# Patient Record
Sex: FEMALE | Race: WHITE | NOT HISPANIC OR LATINO
[De-identification: names, ages, dates, MRNs, and addresses within clinical notes are randomized per-mention and may not be internally consistent; named-entity substitution may affect disease eponyms.]

---

## 2017-02-01 ENCOUNTER — APPOINTMENT (OUTPATIENT)
Dept: PULMONOLOGY | Facility: CLINIC | Age: 77
End: 2017-02-01

## 2017-02-07 ENCOUNTER — APPOINTMENT (OUTPATIENT)
Dept: PULMONOLOGY | Facility: CLINIC | Age: 77
End: 2017-02-07

## 2017-02-08 ENCOUNTER — RESULT REVIEW (OUTPATIENT)
Age: 77
End: 2017-02-08

## 2017-02-15 ENCOUNTER — APPOINTMENT (OUTPATIENT)
Dept: PULMONOLOGY | Facility: CLINIC | Age: 77
End: 2017-02-15

## 2017-02-15 VITALS
HEART RATE: 55 BPM | HEIGHT: 62 IN | SYSTOLIC BLOOD PRESSURE: 118 MMHG | RESPIRATION RATE: 16 BRPM | WEIGHT: 165 LBS | BODY MASS INDEX: 30.36 KG/M2 | OXYGEN SATURATION: 98 % | DIASTOLIC BLOOD PRESSURE: 70 MMHG

## 2017-03-07 ENCOUNTER — APPOINTMENT (OUTPATIENT)
Dept: CARDIOLOGY | Facility: CLINIC | Age: 77
End: 2017-03-07

## 2017-03-07 ENCOUNTER — NON-APPOINTMENT (OUTPATIENT)
Age: 77
End: 2017-03-07

## 2017-03-07 VITALS
WEIGHT: 165 LBS | HEIGHT: 62 IN | DIASTOLIC BLOOD PRESSURE: 61 MMHG | OXYGEN SATURATION: 98 % | SYSTOLIC BLOOD PRESSURE: 107 MMHG | BODY MASS INDEX: 30.36 KG/M2 | HEART RATE: 65 BPM

## 2017-03-27 ENCOUNTER — RX RENEWAL (OUTPATIENT)
Age: 77
End: 2017-03-27

## 2017-04-10 ENCOUNTER — RX RENEWAL (OUTPATIENT)
Age: 77
End: 2017-04-10

## 2017-04-17 ENCOUNTER — APPOINTMENT (OUTPATIENT)
Dept: PULMONOLOGY | Facility: CLINIC | Age: 77
End: 2017-04-17

## 2017-04-17 VITALS
RESPIRATION RATE: 17 BRPM | DIASTOLIC BLOOD PRESSURE: 70 MMHG | HEART RATE: 69 BPM | WEIGHT: 165 LBS | HEIGHT: 62 IN | BODY MASS INDEX: 30.36 KG/M2 | SYSTOLIC BLOOD PRESSURE: 120 MMHG | OXYGEN SATURATION: 97 %

## 2017-05-08 ENCOUNTER — RX RENEWAL (OUTPATIENT)
Age: 77
End: 2017-05-08

## 2017-05-16 ENCOUNTER — APPOINTMENT (OUTPATIENT)
Dept: PULMONOLOGY | Facility: CLINIC | Age: 77
End: 2017-05-16

## 2017-05-16 VITALS
SYSTOLIC BLOOD PRESSURE: 115 MMHG | RESPIRATION RATE: 15 BRPM | OXYGEN SATURATION: 97 % | WEIGHT: 172 LBS | HEIGHT: 62 IN | DIASTOLIC BLOOD PRESSURE: 60 MMHG | BODY MASS INDEX: 31.65 KG/M2 | HEART RATE: 62 BPM

## 2017-05-16 RX ORDER — PREDNISONE 10 MG/1
10 TABLET ORAL
Qty: 100 | Refills: 0 | Status: DISCONTINUED | COMMUNITY
Start: 2017-04-17 | End: 2017-05-16

## 2017-06-05 ENCOUNTER — RX RENEWAL (OUTPATIENT)
Age: 77
End: 2017-06-05

## 2017-06-26 ENCOUNTER — RX RENEWAL (OUTPATIENT)
Age: 77
End: 2017-06-26

## 2017-07-14 ENCOUNTER — APPOINTMENT (OUTPATIENT)
Dept: OBGYN | Facility: CLINIC | Age: 77
End: 2017-07-14

## 2017-07-14 VITALS
WEIGHT: 167 LBS | DIASTOLIC BLOOD PRESSURE: 74 MMHG | BODY MASS INDEX: 31.53 KG/M2 | SYSTOLIC BLOOD PRESSURE: 122 MMHG | HEIGHT: 61 IN

## 2017-07-14 DIAGNOSIS — Z85.3 PERSONAL HISTORY OF MALIGNANT NEOPLASM OF BREAST: ICD-10-CM

## 2017-07-14 DIAGNOSIS — Z85.51 PERSONAL HISTORY OF MALIGNANT NEOPLASM OF BLADDER: ICD-10-CM

## 2017-07-14 LAB — HEMOCCULT SP1 STL QL: NEGATIVE

## 2017-07-17 LAB — HPV HIGH+LOW RISK DNA PNL CVX: NEGATIVE

## 2017-07-25 ENCOUNTER — APPOINTMENT (OUTPATIENT)
Dept: OBGYN | Facility: CLINIC | Age: 77
End: 2017-07-25

## 2017-07-31 LAB — CYTOLOGY CVX/VAG DOC THIN PREP: NORMAL

## 2017-08-07 ENCOUNTER — MESSAGE (OUTPATIENT)
Age: 77
End: 2017-08-07

## 2017-08-10 ENCOUNTER — RESULT REVIEW (OUTPATIENT)
Age: 77
End: 2017-08-10

## 2017-09-04 ENCOUNTER — RX RENEWAL (OUTPATIENT)
Age: 77
End: 2017-09-04

## 2017-09-08 ENCOUNTER — NON-APPOINTMENT (OUTPATIENT)
Age: 77
End: 2017-09-08

## 2017-09-08 ENCOUNTER — APPOINTMENT (OUTPATIENT)
Dept: CARDIOLOGY | Facility: CLINIC | Age: 77
End: 2017-09-08
Payer: MEDICARE

## 2017-09-08 VITALS
DIASTOLIC BLOOD PRESSURE: 75 MMHG | OXYGEN SATURATION: 100 % | RESPIRATION RATE: 14 BRPM | HEART RATE: 47 BPM | BODY MASS INDEX: 31.91 KG/M2 | SYSTOLIC BLOOD PRESSURE: 129 MMHG | WEIGHT: 169 LBS | HEIGHT: 61 IN | TEMPERATURE: 97.5 F

## 2017-09-08 DIAGNOSIS — Z12.72 ENCOUNTER FOR SCREENING FOR MALIGNANT NEOPLASM OF VAGINA: ICD-10-CM

## 2017-09-08 DIAGNOSIS — Z87.09 PERSONAL HISTORY OF OTHER DISEASES OF THE RESPIRATORY SYSTEM: ICD-10-CM

## 2017-09-08 DIAGNOSIS — Z87.898 PERSONAL HISTORY OF OTHER SPECIFIED CONDITIONS: ICD-10-CM

## 2017-09-08 DIAGNOSIS — R06.83 SNORING: ICD-10-CM

## 2017-09-08 DIAGNOSIS — Z87.42 PERSONAL HISTORY OF OTHER DISEASES OF THE FEMALE GENITAL TRACT: ICD-10-CM

## 2017-09-08 PROCEDURE — 99214 OFFICE O/P EST MOD 30 MIN: CPT | Mod: 25

## 2017-09-08 PROCEDURE — G0008: CPT

## 2017-09-08 PROCEDURE — 93000 ELECTROCARDIOGRAM COMPLETE: CPT

## 2017-09-08 PROCEDURE — 90686 IIV4 VACC NO PRSV 0.5 ML IM: CPT

## 2017-09-18 ENCOUNTER — APPOINTMENT (OUTPATIENT)
Dept: PULMONOLOGY | Facility: CLINIC | Age: 77
End: 2017-09-18
Payer: MEDICARE

## 2017-09-18 VITALS
BODY MASS INDEX: 31.91 KG/M2 | WEIGHT: 169 LBS | RESPIRATION RATE: 14 BRPM | OXYGEN SATURATION: 98 % | DIASTOLIC BLOOD PRESSURE: 80 MMHG | SYSTOLIC BLOOD PRESSURE: 124 MMHG | HEART RATE: 52 BPM | HEIGHT: 61 IN

## 2017-09-18 PROCEDURE — 94010 BREATHING CAPACITY TEST: CPT

## 2017-09-18 PROCEDURE — 99214 OFFICE O/P EST MOD 30 MIN: CPT | Mod: 25

## 2017-09-22 PROBLEM — Z87.09 HISTORY OF POSTNASAL DRIP: Status: RESOLVED | Noted: 2017-04-17 | Resolved: 2017-09-22

## 2017-09-22 PROBLEM — Z12.72 VAGINAL PAP SMEAR: Status: RESOLVED | Noted: 2017-07-14 | Resolved: 2017-09-22

## 2017-09-22 PROBLEM — Z87.42 HISTORY OF SENILE ATROPHIC VAGINITIS: Status: RESOLVED | Noted: 2017-07-17 | Resolved: 2017-09-22

## 2017-09-22 PROBLEM — R06.83 PRIMARY SNORING: Status: RESOLVED | Noted: 2017-02-15 | Resolved: 2017-09-22

## 2017-09-25 ENCOUNTER — RX RENEWAL (OUTPATIENT)
Age: 77
End: 2017-09-25

## 2017-09-25 ENCOUNTER — MEDICATION RENEWAL (OUTPATIENT)
Age: 77
End: 2017-09-25

## 2017-11-27 ENCOUNTER — RX RENEWAL (OUTPATIENT)
Age: 77
End: 2017-11-27

## 2017-11-27 ENCOUNTER — MEDICATION RENEWAL (OUTPATIENT)
Age: 77
End: 2017-11-27

## 2017-12-04 ENCOUNTER — RX RENEWAL (OUTPATIENT)
Age: 77
End: 2017-12-04

## 2017-12-04 ENCOUNTER — MEDICATION RENEWAL (OUTPATIENT)
Age: 77
End: 2017-12-04

## 2017-12-11 ENCOUNTER — RX RENEWAL (OUTPATIENT)
Age: 77
End: 2017-12-11

## 2018-01-02 ENCOUNTER — APPOINTMENT (OUTPATIENT)
Dept: PULMONOLOGY | Facility: CLINIC | Age: 78
End: 2018-01-02
Payer: MEDICARE

## 2018-01-02 VITALS
WEIGHT: 170 LBS | RESPIRATION RATE: 14 BRPM | SYSTOLIC BLOOD PRESSURE: 120 MMHG | HEART RATE: 58 BPM | BODY MASS INDEX: 31.28 KG/M2 | HEIGHT: 62 IN | DIASTOLIC BLOOD PRESSURE: 65 MMHG | OXYGEN SATURATION: 98 %

## 2018-01-02 PROCEDURE — 99214 OFFICE O/P EST MOD 30 MIN: CPT | Mod: 25

## 2018-01-02 PROCEDURE — 71046 X-RAY EXAM CHEST 2 VIEWS: CPT

## 2018-01-08 ENCOUNTER — RX RENEWAL (OUTPATIENT)
Age: 78
End: 2018-01-08

## 2018-01-19 ENCOUNTER — APPOINTMENT (OUTPATIENT)
Dept: CARDIOLOGY | Facility: CLINIC | Age: 78
End: 2018-01-19
Payer: MEDICARE

## 2018-01-19 VITALS
HEIGHT: 62 IN | BODY MASS INDEX: 31.28 KG/M2 | SYSTOLIC BLOOD PRESSURE: 120 MMHG | OXYGEN SATURATION: 95 % | WEIGHT: 170 LBS | DIASTOLIC BLOOD PRESSURE: 65 MMHG | HEART RATE: 60 BPM

## 2018-01-19 DIAGNOSIS — Z87.09 PERSONAL HISTORY OF OTHER DISEASES OF THE RESPIRATORY SYSTEM: ICD-10-CM

## 2018-01-19 DIAGNOSIS — J45.901 ACUTE BRONCHITIS, UNSPECIFIED: ICD-10-CM

## 2018-01-19 DIAGNOSIS — J20.9 ACUTE BRONCHITIS, UNSPECIFIED: ICD-10-CM

## 2018-01-19 PROCEDURE — 93000 ELECTROCARDIOGRAM COMPLETE: CPT

## 2018-01-19 PROCEDURE — 99215 OFFICE O/P EST HI 40 MIN: CPT | Mod: 25

## 2018-01-19 RX ORDER — PREDNISONE 10 MG/1
10 TABLET ORAL
Qty: 1 | Refills: 0 | Status: DISCONTINUED | COMMUNITY
Start: 2018-01-02 | End: 2018-01-19

## 2018-01-19 RX ORDER — FLUTICASONE FUROATE AND VILANTEROL TRIFENATATE 200; 25 UG/1; UG/1
200-25 POWDER RESPIRATORY (INHALATION) DAILY
Qty: 3 | Refills: 1 | Status: DISCONTINUED | COMMUNITY
Start: 2017-02-15 | End: 2018-01-19

## 2018-01-19 RX ORDER — AMOXICILLIN AND CLAVULANATE POTASSIUM 875; 125 MG/1; MG/1
875-125 TABLET, COATED ORAL
Qty: 20 | Refills: 0 | Status: DISCONTINUED | COMMUNITY
Start: 2017-11-28 | End: 2018-01-19

## 2018-01-19 RX ORDER — CLARITHROMYCIN 500 MG/1
500 TABLET, FILM COATED ORAL
Qty: 20 | Refills: 0 | Status: DISCONTINUED | COMMUNITY
Start: 2018-01-02 | End: 2018-01-19

## 2018-01-21 PROBLEM — Z87.09 HISTORY OF ALLERGIC RHINITIS: Status: RESOLVED | Noted: 2018-01-02 | Resolved: 2018-01-21

## 2018-01-21 PROBLEM — J20.9 ACUTE BRONCHITIS WITH ASTHMA WITH ACUTE EXACERBATION: Status: RESOLVED | Noted: 2018-01-02 | Resolved: 2018-01-21

## 2018-01-22 ENCOUNTER — APPOINTMENT (OUTPATIENT)
Dept: CARDIOLOGY | Facility: CLINIC | Age: 78
End: 2018-01-22
Payer: MEDICARE

## 2018-01-22 PROCEDURE — 93306 TTE W/DOPPLER COMPLETE: CPT

## 2018-01-24 ENCOUNTER — APPOINTMENT (OUTPATIENT)
Dept: CARDIOLOGY | Facility: CLINIC | Age: 78
End: 2018-01-24
Payer: MEDICARE

## 2018-01-24 PROCEDURE — 93015 CV STRESS TEST SUPVJ I&R: CPT

## 2018-02-02 ENCOUNTER — NON-APPOINTMENT (OUTPATIENT)
Age: 78
End: 2018-02-02

## 2018-02-02 ENCOUNTER — APPOINTMENT (OUTPATIENT)
Dept: CARDIOLOGY | Facility: CLINIC | Age: 78
End: 2018-02-02
Payer: MEDICARE

## 2018-02-02 VITALS
HEART RATE: 55 BPM | DIASTOLIC BLOOD PRESSURE: 65 MMHG | HEIGHT: 62 IN | OXYGEN SATURATION: 97 % | BODY MASS INDEX: 30.55 KG/M2 | SYSTOLIC BLOOD PRESSURE: 102 MMHG | WEIGHT: 166 LBS | TEMPERATURE: 98.2 F

## 2018-02-02 DIAGNOSIS — E55.9 VITAMIN D DEFICIENCY, UNSPECIFIED: ICD-10-CM

## 2018-02-02 PROCEDURE — 99215 OFFICE O/P EST HI 40 MIN: CPT | Mod: 25

## 2018-02-02 PROCEDURE — 93000 ELECTROCARDIOGRAM COMPLETE: CPT

## 2018-02-20 ENCOUNTER — APPOINTMENT (OUTPATIENT)
Dept: CARDIOLOGY | Facility: CLINIC | Age: 78
End: 2018-02-20
Payer: MEDICARE

## 2018-02-20 PROCEDURE — A9500: CPT

## 2018-02-20 PROCEDURE — 78452 HT MUSCLE IMAGE SPECT MULT: CPT

## 2018-02-20 PROCEDURE — 93015 CV STRESS TEST SUPVJ I&R: CPT

## 2018-03-12 ENCOUNTER — RX RENEWAL (OUTPATIENT)
Age: 78
End: 2018-03-12

## 2018-03-12 ENCOUNTER — MEDICATION RENEWAL (OUTPATIENT)
Age: 78
End: 2018-03-12

## 2018-03-16 ENCOUNTER — NON-APPOINTMENT (OUTPATIENT)
Age: 78
End: 2018-03-16

## 2018-03-16 ENCOUNTER — APPOINTMENT (OUTPATIENT)
Dept: CARDIOLOGY | Facility: CLINIC | Age: 78
End: 2018-03-16
Payer: MEDICARE

## 2018-03-16 VITALS
DIASTOLIC BLOOD PRESSURE: 66 MMHG | OXYGEN SATURATION: 97 % | HEIGHT: 62 IN | WEIGHT: 165 LBS | HEART RATE: 47 BPM | TEMPERATURE: 98.3 F | BODY MASS INDEX: 30.36 KG/M2 | SYSTOLIC BLOOD PRESSURE: 107 MMHG | RESPIRATION RATE: 14 BRPM

## 2018-03-16 PROCEDURE — 99214 OFFICE O/P EST MOD 30 MIN: CPT | Mod: 25

## 2018-03-16 PROCEDURE — 93000 ELECTROCARDIOGRAM COMPLETE: CPT

## 2018-04-10 ENCOUNTER — MEDICATION RENEWAL (OUTPATIENT)
Age: 78
End: 2018-04-10

## 2018-04-10 ENCOUNTER — RX RENEWAL (OUTPATIENT)
Age: 78
End: 2018-04-10

## 2018-05-03 ENCOUNTER — APPOINTMENT (OUTPATIENT)
Dept: PULMONOLOGY | Facility: CLINIC | Age: 78
End: 2018-05-03
Payer: MEDICARE

## 2018-05-03 VITALS
BODY MASS INDEX: 30 KG/M2 | DIASTOLIC BLOOD PRESSURE: 60 MMHG | RESPIRATION RATE: 17 BRPM | HEIGHT: 62 IN | HEART RATE: 51 BPM | WEIGHT: 163 LBS | SYSTOLIC BLOOD PRESSURE: 110 MMHG | OXYGEN SATURATION: 96 %

## 2018-05-03 PROCEDURE — 94010 BREATHING CAPACITY TEST: CPT

## 2018-05-03 PROCEDURE — 99214 OFFICE O/P EST MOD 30 MIN: CPT | Mod: 25

## 2018-08-16 ENCOUNTER — RX RENEWAL (OUTPATIENT)
Age: 78
End: 2018-08-16

## 2018-08-27 ENCOUNTER — MEDICATION RENEWAL (OUTPATIENT)
Age: 78
End: 2018-08-27

## 2018-08-27 ENCOUNTER — RX RENEWAL (OUTPATIENT)
Age: 78
End: 2018-08-27

## 2018-09-06 ENCOUNTER — APPOINTMENT (OUTPATIENT)
Dept: PULMONOLOGY | Facility: CLINIC | Age: 78
End: 2018-09-06
Payer: MEDICARE

## 2018-09-06 VITALS
BODY MASS INDEX: 30.36 KG/M2 | DIASTOLIC BLOOD PRESSURE: 60 MMHG | SYSTOLIC BLOOD PRESSURE: 126 MMHG | HEIGHT: 62 IN | OXYGEN SATURATION: 97 % | RESPIRATION RATE: 17 BRPM | WEIGHT: 165 LBS | HEART RATE: 59 BPM

## 2018-09-06 PROCEDURE — 94010 BREATHING CAPACITY TEST: CPT | Mod: 59

## 2018-09-06 PROCEDURE — 94729 DIFFUSING CAPACITY: CPT

## 2018-09-06 PROCEDURE — ZZZZZ: CPT

## 2018-09-06 PROCEDURE — 99214 OFFICE O/P EST MOD 30 MIN: CPT | Mod: 25

## 2018-09-06 PROCEDURE — 94618 PULMONARY STRESS TESTING: CPT

## 2018-09-06 NOTE — PROCEDURE
[FreeTextEntry1] : PFT-spi reveals normal flows with FEV1 of  1.68 ,which is  85 % of predicted, normal flow volume loop with a diffusion of 17.3 , which is  88% of predicted \par \par 6 minute walk test reveals low saturation of   98%; patient walked  0.236  miles or 380  meters

## 2018-09-06 NOTE — ASSESSMENT
[FreeTextEntry1] : Ms. Ponce has a history of asthma, allergies, GERD, ?PAH, and A-fib. She is currently stable from a pulmonary perspective aside from her poor sleep.\par  \par Her shortness of breath is multifactorial due to:\par -asthma\par -poor breathing mechanics\par -questionable pulmonary arterial hypertension\par -cardiac disease\par -overweight\par \par problem 1: asthma\par -add Anoro 1 inhalation QD, gargle and spit after use (transitioning from Breo Ellipta 200 at 1 inhalation QD)\par -continue Singulair 10 mg before bed \par -Asthma is believed to be caused by inherited (genetic) and environmental factor, but its exact cause is unknown. Asthma may be triggered by allergens, lung infections, or irritants in the air. Asthma triggers are different for each person\par -Inhaler technique reviewed as well as oral hygiene techniques reviewed with patient. Avoidance of cold air, extremes of temperature, rescue inhaler should be used before exercise. Order of medication reviewed with patient. Recommended use of a cool mist humidifier in the bedroom.\par \par problem 2: allergy/sinus\par -add Astelin 1 inhalation each nostril BID\par -Environmental measures for allergies were encouraged including mattress and pillow cover, air purifier, and environmental controls. \par \par problem 3: cardiac disease/ A-fib\par -continue to follow up with Dr. Rock \par \par problem 4: poor breathing mechanics\par -Proper breathing techniques were reviewed with an emphasis of exhalation. Patient instructed to breath in for 1 second and out for four seconds. Patient was encouraged to not talk while walking.\par \par problem 5: GERD\par -continue to use Omeprazole 40 mg before breakfast\par -continue to use Zantac 300 mg before bed \par -continue to use Carafate 1 g before each meal, PRN \par \par -Things to avoid including overeating, spicy foods, tight clothing, eating within three hours of bed, this list is not all inclusive. \par -For treatment of reflux, possible options discussed including diet control, H2 blockers, PPIs, as well as coating motility agents discussed as treatment options. Timing of meals and proximity of last meal to sleep were discussed. If symptoms persist, a formal gastrointestinal evaluation is needed.\par \par problem 6: low vitamin d\par -continue to use low vitamin d therapy\par -Has been associated with asthma exacerbations and increased allergic symptoms. The goal based on recent information is maintaining levels between 50-70 and low normal is 30. Recommended 50,000 units every two weeks to once a month depending on the level.\par \par problem 7: primary snoring/poor sleep (?RLS)\par -recommended to try Sleep Guard by [facility] \par -pending Requip 0.5 mg QHS \par -recommended to use Oxy-Aid by [facility] \par -sleep study was not consistent with sleep apnea \par \par problem 8: post nasal drip syndrome\par -she is to continue Flonase 1 sniff/nostriL BID\par -followed by Astelin 1 sniff/nostril BID\par -Environmental measures for allergies were encouraged including mattress and pillow cover, air purifier, and environmental controls. \par \par problem 9: leg cramping\par -recommended to try Myocalm PM\par \par problem 10: health maintenance \par -yearly flu shot after October 15\par -strep pneumonia vaccines: Prevnar-13 vaccine, followed by Pneumo vaccine 23 one year following\par -early intervention for URIs\par -regular osteoporosis evaluations\par -early dermatological evaluations\par -after the age of 52 to the age of 72, colonoscopy every 5 years \par \par \par F/U in 4 months \par She is encouraged to call with any changes, concerns, or questions.

## 2018-09-06 NOTE — REASON FOR VISIT
[Follow-Up] : a follow-up visit [FreeTextEntry1] : allergic rhinitis, asthma, DAVIS, GERD, pulmonary hypertension history, and RLS

## 2018-09-06 NOTE — HISTORY OF PRESENT ILLNESS
[FreeTextEntry1] : Ms. Ponce is a 78 year old female presenting to the office for a follow up visit for allergic rhinitis, asthma, DAVIS, GERd, pulmonary hypertension history, and RLS. Her chief complaint is shortness of breath.\par -She reports she has some back and chest discomfort "that takes her breath away"\par -She notes the pain usually presents occasionally, but has happened everyday this week\par -She states she wakes several times during the night\par -She notes she is sleeping well aside from waking up\par -She notes she snores and suffers from nocturia\par -She states her cardiologist told her the back pain is likely what is taking her breath away\par -She states she walks a mile daily for exercise\par -She states her weight is stable\par -She reports her bowels are regular\par -She reports her leg cramps have improved\par -she notes she has not been using her inhaler as she feels it does not help\par -She denies headaches, nausea, vomiting, fevers, chills, sweats, diarrhea, constipation, leg swelling or pains, wheezing, coughing, heartburn, reflux, muscle aches or pains, hematuria

## 2018-09-06 NOTE — ADDENDUM
[FreeTextEntry1] : Documented by Elvira Kaur acting as a scribe for Dr. Haroldo Mendoza on 9/6/2018.\par \par All medical record entries made by the Scribe were at my, Dr. Haroldo Mendoza's, direction and personally dictated by me on 9/6/2018. I have reviewed the chart and agree that the record accurately reflects my personal performance of the history, physical exam, assessment and plan. I have also personally directed, reviewed, and agree with the discharge instructions.

## 2018-09-06 NOTE — REVIEW OF SYSTEMS
[Negative] : Pulmonary Hypertension [Dyspnea] : dyspnea [Chest Discomfort] : chest discomfort [Nocturia] : nocturia [Difficulty Maintaining Sleep] : difficulty maintaining sleep [Snoring] : snoring [As Noted in HPI] : as noted in HPI [Back Pain] : ~T back pain

## 2018-09-10 ENCOUNTER — RX RENEWAL (OUTPATIENT)
Age: 78
End: 2018-09-10

## 2018-10-30 ENCOUNTER — APPOINTMENT (OUTPATIENT)
Dept: CARDIOLOGY | Facility: CLINIC | Age: 78
End: 2018-10-30
Payer: MEDICARE

## 2018-10-30 ENCOUNTER — NON-APPOINTMENT (OUTPATIENT)
Age: 78
End: 2018-10-30

## 2018-10-30 VITALS
SYSTOLIC BLOOD PRESSURE: 160 MMHG | RESPIRATION RATE: 16 BRPM | DIASTOLIC BLOOD PRESSURE: 70 MMHG | OXYGEN SATURATION: 97 % | BODY MASS INDEX: 30.36 KG/M2 | HEIGHT: 62 IN | WEIGHT: 165 LBS | TEMPERATURE: 98.4 F | HEART RATE: 44 BPM

## 2018-10-30 DIAGNOSIS — Z87.09 PERSONAL HISTORY OF OTHER DISEASES OF THE RESPIRATORY SYSTEM: ICD-10-CM

## 2018-10-30 DIAGNOSIS — Z86.69 PERSONAL HISTORY OF OTHER DISEASES OF THE NERVOUS SYSTEM AND SENSE ORGANS: ICD-10-CM

## 2018-10-30 PROCEDURE — 90686 IIV4 VACC NO PRSV 0.5 ML IM: CPT

## 2018-10-30 PROCEDURE — G0008: CPT

## 2018-10-30 PROCEDURE — 93000 ELECTROCARDIOGRAM COMPLETE: CPT

## 2018-10-30 PROCEDURE — 99215 OFFICE O/P EST HI 40 MIN: CPT | Mod: 25

## 2018-10-30 RX ORDER — METOPROLOL SUCCINATE 25 MG/1
25 TABLET, EXTENDED RELEASE ORAL DAILY
Qty: 30 | Refills: 1 | Status: DISCONTINUED | COMMUNITY
Start: 2017-09-25 | End: 2018-10-30

## 2018-11-05 PROBLEM — Z87.09 HISTORY OF ALLERGIC RHINITIS: Status: RESOLVED | Noted: 2018-05-03 | Resolved: 2018-11-04

## 2018-11-05 PROBLEM — Z87.09 HISTORY OF ASTHMA: Status: RESOLVED | Noted: 2018-05-03 | Resolved: 2018-11-04

## 2018-11-05 PROBLEM — Z86.69 HISTORY OF RESTLESS LEGS SYNDROME: Status: RESOLVED | Noted: 2018-05-03 | Resolved: 2018-11-04

## 2018-11-05 NOTE — PHYSICAL EXAM
[General Appearance - Well Developed] : well developed [Normal Appearance] : normal appearance [Well Groomed] : well groomed [General Appearance - Well Nourished] : well nourished [No Deformities] : no deformities [General Appearance - In No Acute Distress] : no acute distress [Normal Conjunctiva] : the conjunctiva exhibited no abnormalities [Eyelids - No Xanthelasma] : the eyelids demonstrated no xanthelasmas [Normal Oral Mucosa] : normal oral mucosa [No Oral Pallor] : no oral pallor [No Oral Cyanosis] : no oral cyanosis [Normal Jugular Venous A Waves Present] : normal jugular venous A waves present [Normal Jugular Venous V Waves Present] : normal jugular venous V waves present [No Jugular Venous Yu A Waves] : no jugular venous yu A waves [Respiration, Rhythm And Depth] : normal respiratory rhythm and effort [Exaggerated Use Of Accessory Muscles For Inspiration] : no accessory muscle use [Auscultation Breath Sounds / Voice Sounds] : lungs were clear to auscultation bilaterally [Abdomen Soft] : soft [Abdomen Tenderness] : non-tender [Abdomen Mass (___ Cm)] : no abdominal mass palpated [Abnormal Walk] : normal gait [Gait - Sufficient For Exercise Testing] : the gait was sufficient for exercise testing [Nail Clubbing] : no clubbing of the fingernails [Cyanosis, Localized] : no localized cyanosis [Petechial Hemorrhages (___cm)] : no petechial hemorrhages [Skin Color & Pigmentation] : normal skin color and pigmentation [] : no rash [No Venous Stasis] : no venous stasis [Skin Lesions] : no skin lesions [No Skin Ulcers] : no skin ulcer [No Xanthoma] : no  xanthoma was observed [Oriented To Time, Place, And Person] : oriented to person, place, and time [Affect] : the affect was normal [Mood] : the mood was normal [No Anxiety] : not feeling anxious [5th Left ICS - MCL] : palpated at the 5th LICS in the midclavicular line [Normal] : normal [No Precordial Heave] : no precordial heave was noted [Normal Rate] : normal [Normal S1] : normal S1 [Normal S2] : normal S2 [S3] : no S3 [S4] : an S4 was heard [I] : a grade 1 [II] : a grade 2 [Right Carotid Bruit] : no bruit heard over the right carotid [Left Carotid Bruit] : no bruit heard over the left carotid [Right Femoral Bruit] : no bruit heard over the right femoral artery [Left Femoral Bruit] : no bruit heard over the left femoral artery [2+] : left 2+ [No Abnormalities] : the abdominal aorta was not enlarged and no bruit was heard [Bruit] : no bruit heard [No Pitting Edema] : no pitting edema present

## 2018-11-05 NOTE — HISTORY OF PRESENT ILLNESS
[FreeTextEntry1] : Still winded with activity.  saw pulmonary who feels her SOB is multifactorial (asthma, possible pulmonary HTN, overweight, cardiac disease, and being over weight).  BP is elevated here today but the only cardiac disease that could be contributing is diastolic dysfunction.  Will adjust meds to try and help this.

## 2018-11-05 NOTE — REVIEW OF SYSTEMS
[Headache] : headache [Feeling Fatigued] : not feeling fatigued [see HPI] : see HPI [Shortness Of Breath] : no shortness of breath [Dyspnea on exertion] : dyspnea during exertion [Chest  Pressure] : no chest pressure [Chest Pain] : no chest pain [Lower Ext Edema] : no extremity edema [Leg Claudication] : no intermittent leg claudication [Palpitations] : no palpitations [Cough] : cough [Negative] : Heme/Lymph

## 2018-11-05 NOTE — DISCUSSION/SUMMARY
[FreeTextEntry1] : DAVIS: Possiblely from DD secondary to elevated BP. Increase hydralazine.\par Atrial fibrillation: Doing reasonably well on current regime. Wants to change to Xarelto.  Will change over. 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  Continue to monitor.\par Malignant hypertension: Sub optimal control. Medication adjusted as noted above. Low sodium discussed.\par Followup 3 months.

## 2018-11-05 NOTE — REASON FOR VISIT
[Follow-Up - Clinic] : a clinic follow-up of [Abnormal ECG] : an abnormal ECG [Atrial Fibrillation] : atrial fibrillation [Hypertension] : hypertension

## 2018-11-05 NOTE — CARDIOLOGY SUMMARY
[No Ischemia] : no Ischemia [No Exercise Ind Arr] : no exercise induced arrhythmias [No Symptoms] : no Symptoms [___] : [unfilled] [LVEF ___%] : LVEF [unfilled]% [None] : normal LV function [Mild] : mild pulmonary hypertension [Enlarged] : enlarged LA size

## 2018-11-30 ENCOUNTER — APPOINTMENT (OUTPATIENT)
Dept: CARDIOLOGY | Facility: CLINIC | Age: 78
End: 2018-11-30
Payer: MEDICARE

## 2018-11-30 VITALS
BODY MASS INDEX: 30.36 KG/M2 | OXYGEN SATURATION: 97 % | DIASTOLIC BLOOD PRESSURE: 70 MMHG | HEART RATE: 48 BPM | TEMPERATURE: 98.5 F | HEIGHT: 62 IN | WEIGHT: 165 LBS | SYSTOLIC BLOOD PRESSURE: 129 MMHG | RESPIRATION RATE: 16 BRPM

## 2018-11-30 PROCEDURE — 93000 ELECTROCARDIOGRAM COMPLETE: CPT

## 2018-11-30 PROCEDURE — 99214 OFFICE O/P EST MOD 30 MIN: CPT | Mod: 25

## 2018-11-30 RX ORDER — FLUTICASONE FUROATE AND VILANTEROL TRIFENATATE 200; 25 UG/1; UG/1
200-25 POWDER RESPIRATORY (INHALATION) DAILY
Qty: 3 | Refills: 1 | Status: DISCONTINUED | COMMUNITY
Start: 2017-09-18 | End: 2018-11-30

## 2018-11-30 RX ORDER — AZELASTINE HYDROCHLORIDE 205.5 UG/1
0.15 SPRAY, METERED NASAL TWICE DAILY
Qty: 1 | Refills: 1 | Status: DISCONTINUED | COMMUNITY
Start: 2018-01-02 | End: 2018-11-30

## 2018-12-09 NOTE — PHYSICAL EXAM
[General Appearance - Well Developed] : well developed [Normal Appearance] : normal appearance [Well Groomed] : well groomed [General Appearance - Well Nourished] : well nourished [No Deformities] : no deformities [General Appearance - In No Acute Distress] : no acute distress [Normal Conjunctiva] : the conjunctiva exhibited no abnormalities [Eyelids - No Xanthelasma] : the eyelids demonstrated no xanthelasmas [Normal Oral Mucosa] : normal oral mucosa [No Oral Pallor] : no oral pallor [No Oral Cyanosis] : no oral cyanosis [Normal Jugular Venous A Waves Present] : normal jugular venous A waves present [Normal Jugular Venous V Waves Present] : normal jugular venous V waves present [No Jugular Venous Yu A Waves] : no jugular venous yu A waves [Respiration, Rhythm And Depth] : normal respiratory rhythm and effort [Exaggerated Use Of Accessory Muscles For Inspiration] : no accessory muscle use [Auscultation Breath Sounds / Voice Sounds] : lungs were clear to auscultation bilaterally [Abdomen Soft] : soft [Abdomen Tenderness] : non-tender [Abdomen Mass (___ Cm)] : no abdominal mass palpated [Abnormal Walk] : normal gait [Gait - Sufficient For Exercise Testing] : the gait was sufficient for exercise testing [Nail Clubbing] : no clubbing of the fingernails [Cyanosis, Localized] : no localized cyanosis [Petechial Hemorrhages (___cm)] : no petechial hemorrhages [Skin Color & Pigmentation] : normal skin color and pigmentation [] : no rash [No Venous Stasis] : no venous stasis [Skin Lesions] : no skin lesions [No Skin Ulcers] : no skin ulcer [No Xanthoma] : no  xanthoma was observed [Oriented To Time, Place, And Person] : oriented to person, place, and time [Affect] : the affect was normal [Mood] : the mood was normal [No Anxiety] : not feeling anxious [5th Left ICS - MCL] : palpated at the 5th LICS in the midclavicular line [Normal] : normal [No Precordial Heave] : no precordial heave was noted [Normal Rate] : normal [Normal S1] : normal S1 [Normal S2] : normal S2 [S4] : an S4 was heard [I] : a grade 1 [II] : a grade 2 [2+] : left 2+ [No Abnormalities] : the abdominal aorta was not enlarged and no bruit was heard [No Pitting Edema] : no pitting edema present [S3] : no S3 [Right Carotid Bruit] : no bruit heard over the right carotid [Left Carotid Bruit] : no bruit heard over the left carotid [Right Femoral Bruit] : no bruit heard over the right femoral artery [Left Femoral Bruit] : no bruit heard over the left femoral artery [Bruit] : no bruit heard

## 2018-12-09 NOTE — HISTORY OF PRESENT ILLNESS
[FreeTextEntry1] : BP much better but is still winded.  She is questioning it being from hydralazine.  Denies chest pain.  orthopnea or PND. Discussed if she wants to change at this time and she does not.

## 2018-12-09 NOTE — REVIEW OF SYSTEMS
[Headache] : headache [see HPI] : see HPI [Dyspnea on exertion] : dyspnea during exertion [Cough] : cough [Negative] : Heme/Lymph [Feeling Fatigued] : not feeling fatigued [Shortness Of Breath] : no shortness of breath [Chest  Pressure] : no chest pressure [Chest Pain] : no chest pain [Lower Ext Edema] : no extremity edema [Leg Claudication] : no intermittent leg claudication [Palpitations] : no palpitations

## 2018-12-09 NOTE — DISCUSSION/SUMMARY
[FreeTextEntry1] : DAVIS/Malignant hypertension: Improved BP control and DAVIS better but not resolved.  Wants to continue this regime and see if time helps it improve further. Low sodium discussed.\par Atrial fibrillation: Doing reasonably well on current regime. Continue to monitor.\par Sleep Apnea: Following with pulmonary.\par Followup 3 months.

## 2018-12-11 ENCOUNTER — NON-APPOINTMENT (OUTPATIENT)
Age: 78
End: 2018-12-11

## 2019-01-07 ENCOUNTER — MEDICATION RENEWAL (OUTPATIENT)
Age: 79
End: 2019-01-07

## 2019-01-10 ENCOUNTER — NON-APPOINTMENT (OUTPATIENT)
Age: 79
End: 2019-01-10

## 2019-01-10 ENCOUNTER — APPOINTMENT (OUTPATIENT)
Dept: PULMONOLOGY | Facility: CLINIC | Age: 79
End: 2019-01-10
Payer: MEDICARE

## 2019-01-10 ENCOUNTER — RX RENEWAL (OUTPATIENT)
Age: 79
End: 2019-01-10

## 2019-01-10 VITALS
RESPIRATION RATE: 15 BRPM | HEART RATE: 47 BPM | HEIGHT: 62 IN | OXYGEN SATURATION: 97 % | DIASTOLIC BLOOD PRESSURE: 70 MMHG | BODY MASS INDEX: 30 KG/M2 | SYSTOLIC BLOOD PRESSURE: 120 MMHG | WEIGHT: 163 LBS

## 2019-01-10 PROCEDURE — 99214 OFFICE O/P EST MOD 30 MIN: CPT | Mod: 25

## 2019-01-10 PROCEDURE — 94010 BREATHING CAPACITY TEST: CPT

## 2019-01-10 NOTE — HISTORY OF PRESENT ILLNESS
[FreeTextEntry1] : Ms. Ponce is a 78 year old female presenting to the office for a follow up visit for allergic rhinitis, asthma, DAVIS, GERD, pulmonary hypertension history, and RLS. Her chief complaint is back pain and shortness of breath.\par -She reports she has bilateral upper and lower back pain which causes her to be short of breath\par -she notes she also had chest pain with her back pain\par -she states she get SOB with stairs as well\par -she reports her heartburn and reflux has been active as well\par -she states the back pain occurs randomly  throughout the day\par -she notes she has had this pain for 2 years now\par -she reports her bowels are regular \par -she states she is sleeping well\par -she notes she has been coughing as well\par -she reports her sinuses are clear but she has a post nasal drip\par -she denies any chest pressure, diarrhea, constipation, dysphagia, dizziness

## 2019-01-10 NOTE — PROCEDURE
[FreeTextEntry1] : PFT revealed normal flows, with a FEV1 of 1.71  ,which is 91% of predicted, with a normal flow volume loop\par

## 2019-01-10 NOTE — REVIEW OF SYSTEMS
[Negative] : Sleep Disorder [Cough] : cough [Dyspnea] : dyspnea [Chest Discomfort] : chest discomfort [Heartburn] : heartburn [Reflux] : reflux [As Noted in HPI] : as noted in HPI [Back Pain] : ~T back pain

## 2019-01-10 NOTE — REASON FOR VISIT
[Follow-Up] : a follow-up visit [Spouse] : spouse [FreeTextEntry1] : allergic rhinitis, asthma, DAVIS, GERD, pulmonary hypertension history, and RLS

## 2019-01-10 NOTE — ADDENDUM
[FreeTextEntry1] : Documented by Tod Melo acting as a scribe for Dr. Haroldo Mendoza on 1/10/19.\par \par All medical record entries made by the Scribe were at my, Dr. Haroldo Mendoza's, direction and personally dictated by me on 1/10/19. I have reviewed the chart and agree that the record accurately reflects my personal performance of the history, physical exam, procedure, assessment and plan. I have also personally directed, reviewed, and agree with the discharge instructions. \par \par

## 2019-01-10 NOTE — ASSESSMENT
[FreeTextEntry1] : Ms. Ponce has a history of asthma, allergies, GERD, ?PAH, and A-fib. She is currently stable from a pulmonary perspective aside from her poor sleep and bilateral back pain\par  \par Her shortness of breath is multifactorial due to:\par -asthma\par -poor breathing mechanics\par -questionable pulmonary arterial hypertension\par -cardiac disease\par -overweight\par \par problem 1: asthma\par -continue Anoro 1 inhalation QD, gargle and spit after use \par -continue Singulair 10 mg before bed \par -Asthma is believed to be caused by inherited (genetic) and environmental factor, but its exact cause is unknown. Asthma may be triggered by allergens, lung infections, or irritants in the air. Asthma triggers are different for each person\par -Inhaler technique reviewed as well as oral hygiene techniques reviewed with patient. Avoidance of cold air, extremes of temperature, rescue inhaler should be used before exercise. Order of medication reviewed with patient. Recommended use of a cool mist humidifier in the bedroom.\par \par problem 2: allergy/ post nasal drip syndrome\par  - continue Flonase 1 sniff/nostriL BID\par -followed by Valarie (.15) 1 sniff/nostril BID\par -Environmental measures for allergies were encouraged including mattress and pillow cover, air purifier, and environmental controls.\par \par problem 3: cardiac disease/ A-fib\par -continue to follow up with Dr. Rock \par \par problem 4: poor breathing mechanics\par -Proper breathing techniques were reviewed with an emphasis of exhalation. Patient instructed to breath in for 1 second and out for four seconds. Patient was encouraged to not talk while walking.\par \par problem 5: GERD\par -continue to use Omeprazole 40 mg before breakfast\par -continue to use Zantac 300 mg before bed \par -continue to use Carafate 1 g before each meal, PRN \par \par -Things to avoid including overeating, spicy foods, tight clothing, eating within three hours of bed, this list is not all inclusive. \par -For treatment of reflux, possible options discussed including diet control, H2 blockers, PPIs, as well as coating motility agents discussed as treatment options. Timing of meals and proximity of last meal to sleep were discussed. If symptoms persist, a formal gastrointestinal evaluation is needed.\par \par problem 6: low vitamin d\par -continue to use low vitamin d therapy\par -Has been associated with asthma exacerbations and increased allergic symptoms. The goal based on recent information is maintaining levels between 50-70 and low normal is 30. Recommended 50,000 units every two weeks to once a month depending on the level.\par \par problem 7: primary snoring/poor sleep (?RLS)\par -recommended to try Sleep Guard by [facility] \par -pending Requip 0.5 mg QHS \par -recommended to use Oxy-Aid by [employer] \par -sleep study was not consistent with sleep apnea \par \par problem 8: leg cramping / back pain\par -add Tizanidine 2 mg BID up to TiD\par -recommended to try Myocalm PM\par \par problem 9: health maintenance \par - s/p 2018 flu shot \par -strep pneumonia vaccines: Prevnar-13 vaccine, followed by Pneumo vaccine 23 one year following\par -early intervention for URIs\par -regular osteoporosis evaluations\par -early dermatological evaluations\par -after the age of 52 to the age of 72, colonoscopy every 5 years \par \par \par F/U in 4 months \par She is encouraged to call with any changes, concerns, or questions.

## 2019-01-17 ENCOUNTER — APPOINTMENT (OUTPATIENT)
Dept: OBGYN | Facility: CLINIC | Age: 79
End: 2019-01-17
Payer: MEDICARE

## 2019-01-17 VITALS
HEIGHT: 62 IN | BODY MASS INDEX: 25.21 KG/M2 | DIASTOLIC BLOOD PRESSURE: 74 MMHG | WEIGHT: 137 LBS | SYSTOLIC BLOOD PRESSURE: 130 MMHG

## 2019-01-17 LAB — HEMOCCULT SP1 STL QL: NEGATIVE

## 2019-01-17 PROCEDURE — G0101: CPT

## 2019-01-17 PROCEDURE — 82270 OCCULT BLOOD FECES: CPT

## 2019-01-17 NOTE — COUNSELING
[Breast Self Exam] : breast self exam [Nutrition] : nutrition [Exercise] : exercise [Vitamins/Supplements] : vitamins/supplements [FreeTextEntry2] : Encouraged pt to consume 1200 mg. of calcium daily, in foods, or in supplements.  If using supplements, correct use of same reviewed.  Advised pt to get her Vit D level checked, and to take at least 1000 iu of Vit D3 daily in the interim;  discussed importance of weight bearing and resistance exercise,  5 times weekly for 30 minutes, and  finally safety was discussed .

## 2019-01-17 NOTE — HISTORY OF PRESENT ILLNESS
[___ Year(s) Ago] : [unfilled] year(s) ago [Good] : being in good health [Healthy Diet] : a healthy diet [Regular Exercise] : regular exercise [___ Servings of Dairy/Day] : [unfilled] servings of dairy foods per day [3 or more times/wk] :  3 or more times per week [Last Mammogram ___] : Last Mammogram was [unfilled] [Last Bone Density ___] : Last bone density studies [unfilled] [Last Colonoscopy ___] : Last colonoscopy [unfilled] [Last Pap ___] : Last cervical pap smear was [unfilled] [Performed Within 5 Years] : lipid profile performed within the past five years [Performed Last Year] : thyroid function test performed last year [Uncertain Timing] : uncertain timing of ~his/her~ last DEXA [Hypertension] : hypertension [FH Cardiovascular Disease] : family history of cardiovascular disease [Previous Breast Cancer] : previous breast cancer [Previous Colon Polyps] : previous colon polyps [Osteoporosis Risk Factors] : osteoporosis risk factors [Walking] : walking [Current] : risk screening reviewed and current [Postmenopausal] : is postmenopausal [Pregnancy History] : pregnancy history: [Total Preg ___] : [unfilled] [Full Term ___] : [unfilled] [AB Spont ___] : miscarriages: [unfilled] [Sexually Active] : is sexually active [Monogamous (Male Partner)] : is monogamous with a male partner [HPV Vaccine NA Due to Age] : HPV vaccine not available to patient due to age [Weight Concerns] : no concerns with her weight [Diabetes] : no diabetes [High LDL] : no high LDL cholesterol [Low HDL] : no low HDL cholesterol [Obesity] : no obesity [Tobacco Use] : no tobacco use [Illicit Drug Use] : no illicit drug use [Sedentary Lifestyle] : no sedentary lifestyle [Abnormal Cervical Cytology] : no abnormal cervical cytology [HPV Positive] : no positive screening for human papilloma virus [In Utero SHANTHI Exposure] : no diethylstilbestrol (SHANTHI) exposure in utero [Inflammatory Bowel Disease] : no inflammatory bowel disease [de-identified] : bladder cancer [de-identified] : hyst at 1975 and no menses thereafter

## 2019-01-17 NOTE — PHYSICAL EXAM
[Awake] : awake [Alert] : alert [Breast Palpation Implant ___cm In The Left Breast] : an implant [Left Breast Absent] : a total mastectomy [Soft] : soft [Oriented x3] : oriented to person, place, and time [Normal] : vagina [Atrophy] : atrophy [No Bleeding] : there was no active vaginal bleeding [Absent] : absent [Uterine Adnexae] : were not tender and not enlarged [No Tenderness] : no rectal tenderness [Acute Distress] : no acute distress [LAD] : no lymphadenopathy [Thyroid Nodule] : no thyroid nodule [Goiter] : no goiter [Mass] : no breast mass [Nipple Discharge] : no nipple discharge [Axillary LAD] : no axillary lymphadenopathy [Tender] : non tender [Occult Blood] : occult blood test from digital rectal exam was negative

## 2019-01-29 ENCOUNTER — APPOINTMENT (OUTPATIENT)
Dept: CARDIOLOGY | Facility: CLINIC | Age: 79
End: 2019-01-29
Payer: MEDICARE

## 2019-01-29 ENCOUNTER — NON-APPOINTMENT (OUTPATIENT)
Age: 79
End: 2019-01-29

## 2019-01-29 VITALS
WEIGHT: 167 LBS | HEIGHT: 62 IN | DIASTOLIC BLOOD PRESSURE: 68 MMHG | BODY MASS INDEX: 30.73 KG/M2 | OXYGEN SATURATION: 98 % | HEART RATE: 62 BPM | SYSTOLIC BLOOD PRESSURE: 134 MMHG

## 2019-01-29 DIAGNOSIS — G89.29 PERSONAL HISTORY OF OTHER DISEASES OF THE MUSCULOSKELETAL SYSTEM AND CONNECTIVE TISSUE: ICD-10-CM

## 2019-01-29 DIAGNOSIS — R06.83 SNORING: ICD-10-CM

## 2019-01-29 DIAGNOSIS — Z87.19 PERSONAL HISTORY OF OTHER DISEASES OF THE DIGESTIVE SYSTEM: ICD-10-CM

## 2019-01-29 DIAGNOSIS — G47.62 SLEEP RELATED LEG CRAMPS: ICD-10-CM

## 2019-01-29 DIAGNOSIS — Z87.09 PERSONAL HISTORY OF OTHER DISEASES OF THE RESPIRATORY SYSTEM: ICD-10-CM

## 2019-01-29 DIAGNOSIS — R79.89 OTHER SPECIFIED ABNORMAL FINDINGS OF BLOOD CHEMISTRY: ICD-10-CM

## 2019-01-29 DIAGNOSIS — Z12.11 ENCOUNTER FOR SCREENING FOR MALIGNANT NEOPLASM OF COLON: ICD-10-CM

## 2019-01-29 DIAGNOSIS — Z01.419 ENCOUNTER FOR GYNECOLOGICAL EXAMINATION (GENERAL) (ROUTINE) W/OUT ABNORMAL FINDINGS: ICD-10-CM

## 2019-01-29 DIAGNOSIS — Z87.39 PERSONAL HISTORY OF OTHER DISEASES OF THE MUSCULOSKELETAL SYSTEM AND CONNECTIVE TISSUE: ICD-10-CM

## 2019-01-29 PROCEDURE — 93000 ELECTROCARDIOGRAM COMPLETE: CPT

## 2019-01-29 PROCEDURE — 99214 OFFICE O/P EST MOD 30 MIN: CPT | Mod: 25

## 2019-01-29 RX ORDER — WARFARIN 3 MG/1
3 TABLET ORAL
Qty: 90 | Refills: 0 | Status: DISCONTINUED | COMMUNITY
Start: 2017-05-26 | End: 2019-01-29

## 2019-01-29 NOTE — DISCUSSION/SUMMARY
[FreeTextEntry1] : Malignant hypertension: Controlled and better.  Low sodium discussed.\par Atrial fibrillation: Doing reasonably well on current regime. Continue to monitor.\par Sleep Apnea: Following with pulmonary.\par HL: Recent Lipids with good results.\par Followup 6 months.

## 2019-01-29 NOTE — HISTORY OF PRESENT ILLNESS
[FreeTextEntry1] : BP good and is used to the way she is on hydralazine.  Denies chest pain, has rare palpitations, but denies orthopnea, paroxysmal nocturnal dyspnea, claudication, dizziness,  lightheadedness, presyncopal, or syncopal symptoms.\par

## 2019-03-11 ENCOUNTER — RX RENEWAL (OUTPATIENT)
Age: 79
End: 2019-03-11

## 2019-03-12 ENCOUNTER — RX RENEWAL (OUTPATIENT)
Age: 79
End: 2019-03-12

## 2019-05-10 ENCOUNTER — NON-APPOINTMENT (OUTPATIENT)
Age: 79
End: 2019-05-10

## 2019-05-10 ENCOUNTER — APPOINTMENT (OUTPATIENT)
Dept: PULMONOLOGY | Facility: CLINIC | Age: 79
End: 2019-05-10
Payer: MEDICARE

## 2019-05-10 VITALS
OXYGEN SATURATION: 97 % | HEIGHT: 62 IN | SYSTOLIC BLOOD PRESSURE: 130 MMHG | WEIGHT: 166 LBS | RESPIRATION RATE: 16 BRPM | HEART RATE: 56 BPM | BODY MASS INDEX: 30.55 KG/M2 | DIASTOLIC BLOOD PRESSURE: 60 MMHG

## 2019-05-10 PROCEDURE — 99214 OFFICE O/P EST MOD 30 MIN: CPT | Mod: 25

## 2019-05-10 PROCEDURE — 94010 BREATHING CAPACITY TEST: CPT

## 2019-05-10 NOTE — HISTORY OF PRESENT ILLNESS
[FreeTextEntry1] : Ms. Ponce is a 78 year old female presenting to the office for a follow up visit for allergic rhinitis, asthma, DAVIS, GERD, pulmonary hypertension history, and RLS. Her chief complaint is joint pain and heartburn\par - She comes in stating that she has had a number of joint pains all over her body\par - She notes having neck pain with associated chest pain\par - She has a dry mouth \par - She gets occasional dizziness. \par - She has been  having difficulty ambulating due to leg pain (joint)\par - She coughs once in a while\par - She states that her sleep is generally "not bad"\par - She sleeps around 4 hours. \par - She has been eating relatively well \par - She gets agita everyday. \par - Her weight is stable \par - She denies any headaches, nausea, vomiting, fever, chills, sweats, chest pressure, palpitations, SOB, wheezing, diarrhea, constipation, dysphagia, myalgias, leg swelling, itchy eyes, itchy ears, or sour taste in the mouth.

## 2019-05-10 NOTE — PHYSICAL EXAM
[General Appearance - Well Developed] : well developed [Normal Appearance] : normal appearance [Well Groomed] : well groomed [General Appearance - In No Acute Distress] : no acute distress [No Deformities] : no deformities [Normal Conjunctiva] : the conjunctiva exhibited no abnormalities [General Appearance - Well Nourished] : well nourished [Eyelids - No Xanthelasma] : the eyelids demonstrated no xanthelasmas [Normal Oropharynx] : normal oropharynx [Neck Appearance] : the appearance of the neck was normal [II] : II [Jugular Venous Distention Increased] : there was no jugular-venous distention [Neck Cervical Mass (___cm)] : no neck mass was observed [Thyroid Diffuse Enlargement] : the thyroid was not enlarged [Heart Rate And Rhythm] : heart rate and rhythm were normal [Thyroid Nodule] : there were no palpable thyroid nodules [Heart Sounds] : normal S1 and S2 [Murmurs] : no murmurs present [Respiration, Rhythm And Depth] : normal respiratory rhythm and effort [Auscultation Breath Sounds / Voice Sounds] : lungs were clear to auscultation bilaterally [Exaggerated Use Of Accessory Muscles For Inspiration] : no accessory muscle use [Abdomen Soft] : soft [Abdomen Tenderness] : non-tender [Abdomen Mass (___ Cm)] : no abdominal mass palpated [Abnormal Walk] : normal gait [Gait - Sufficient For Exercise Testing] : the gait was sufficient for exercise testing [Cyanosis, Localized] : no localized cyanosis [Nail Clubbing] : no clubbing of the fingernails [Skin Color & Pigmentation] : normal skin color and pigmentation [] : no rash [Petechial Hemorrhages (___cm)] : no petechial hemorrhages [No Venous Stasis] : no venous stasis [Skin Lesions] : no skin lesions [No Skin Ulcers] : no skin ulcer [No Xanthoma] : no  xanthoma was observed [Deep Tendon Reflexes (DTR)] : deep tendon reflexes were 2+ and symmetric [Sensation] : the sensory exam was normal to light touch and pinprick [No Focal Deficits] : no focal deficits [Impaired Insight] : insight and judgment were intact [Oriented To Time, Place, And Person] : oriented to person, place, and time [Affect] : the affect was normal [FreeTextEntry1] : I:E 1:3, clear

## 2019-05-10 NOTE — PROCEDURE
[FreeTextEntry1] : PFT- spi reveals normal flows; FEV1 was 1.75 L which is 92% of predicted; normal flow volume loop \par \par Pt unable to complete NiuOx

## 2019-05-10 NOTE — ASSESSMENT
[FreeTextEntry1] : Ms. Ponce has a history of asthma, allergies, GERD, ?PAH, and A-fib. She is currently stable from a pulmonary perspective aside from her poor sleep, bilateral back pain, and reflux symptoms (currently off of omeprazole). \par  \par Her shortness of breath is multifactorial due to:\par -asthma\par -poor breathing mechanics\par -questionable pulmonary arterial hypertension\par -cardiac disease\par -overweight\par \par problem 1: asthma\par -continue Anoro 1 inhalation QD, gargle and spit after use \par -continue Singulair 10 mg before bed \par -Asthma is believed to be caused by inherited (genetic) and environmental factor, but its exact cause is unknown. Asthma may be triggered by allergens, lung infections, or irritants in the air. Asthma triggers are different for each person\par -Inhaler technique reviewed as well as oral hygiene techniques reviewed with patient. Avoidance of cold air, extremes of temperature, rescue inhaler should be used before exercise. Order of medication reviewed with patient. Recommended use of a cool mist humidifier in the bedroom.\par \par problem 2: allergy/ post nasal drip syndrome\par  - continue Flonase 1 sniff/nostriL BID\par -followed by Valarie (.15) 1 sniff/nostril BID\par -Environmental measures for allergies were encouraged including mattress and pillow cover, air purifier, and environmental controls.\par \par problem 3: cardiac disease/ A-fib\par -continue to follow up with Dr. Rock \par \par problem 4: poor breathing mechanics\par -Proper breathing techniques were reviewed with an emphasis of exhalation. Patient instructed to breath in for 1 second and out for four seconds. Patient was encouraged to not talk while walking.\par \par problem 5: GERD\par -continue to use Zantac 300 mg before bed \par -continue to use Carafate 1 g before each meal, PRN \par \par -Things to avoid including overeating, spicy foods, tight clothing, eating within three hours of bed, this list is not all inclusive. \par -For treatment of reflux, possible options discussed including diet control, H2 blockers, PPIs, as well as coating motility agents discussed as treatment options. Timing of meals and proximity of last meal to sleep were discussed. If symptoms persist, a formal gastrointestinal evaluation is needed.\par \par problem 6: low vitamin d\par -continue to use low vitamin d therapy\par -Has been associated with asthma exacerbations and increased allergic symptoms. The goal based on recent information is maintaining levels between 50-70 and low normal is 30. Recommended 50,000 units every two weeks to once a month depending on the level.\par \par problem 7: primary snoring/poor sleep (?RLS)\par -recommended to try Sleep Guard by [employer] \par -pending Requip 0.5 mg QHS \par -recommended to use Oxy-Aid by [employer] \par -sleep study was not consistent with sleep apnea \par \par problem 8: leg cramping / back pain\par -add Tizanidine 2 mg BID up to TiD\par -recommended to try Myocalm PM\par - Recommended Joint Guard\par - Recommended Pain Guard\par \par problem 9: health maintenance \par - s/p 2018 flu shot \par -strep pneumonia vaccines: Prevnar-13 vaccine, followed by Pneumo vaccine 23 one year following\par -early intervention for URIs\par -regular osteoporosis evaluations\par -early dermatological evaluations\par -after the age of 52 to the age of 72, colonoscopy every 5 years \par \par \par F/U in 4 months \par She is encouraged to call with any changes, concerns, or questions.

## 2019-05-10 NOTE — ADDENDUM
[FreeTextEntry1] : Documented by Erik Schilling acting as a scribe for Dr. Haroldo Mendoza on 5/10/2019\par \par All medical record entries made by the Scribe were at my, Dr. Haroldo Mendoza's, direction and personally dictated by me on 5/10/2019. I have reviewed the chart and agree that the record accurately reflects my personal performance of the history, physical exam, assessment and plan. I have also personally directed, reviewed, and agree with the discharge instructions. \par \par \par \par \par

## 2019-06-17 ENCOUNTER — APPOINTMENT (OUTPATIENT)
Dept: RHEUMATOLOGY | Facility: CLINIC | Age: 79
End: 2019-06-17
Payer: MEDICARE

## 2019-06-17 VITALS
OXYGEN SATURATION: 98 % | SYSTOLIC BLOOD PRESSURE: 92 MMHG | HEIGHT: 62 IN | DIASTOLIC BLOOD PRESSURE: 52 MMHG | HEART RATE: 58 BPM | WEIGHT: 165 LBS | BODY MASS INDEX: 30.36 KG/M2

## 2019-06-17 PROCEDURE — 99204 OFFICE O/P NEW MOD 45 MIN: CPT

## 2019-06-17 RX ORDER — TIZANIDINE 2 MG/1
2 TABLET ORAL
Qty: 360 | Refills: 2 | Status: DISCONTINUED | COMMUNITY
Start: 2019-01-10 | End: 2019-06-17

## 2019-06-17 RX ORDER — CETIRIZINE HYDROCHLORIDE 10 MG/1
10 TABLET, COATED ORAL
Qty: 30 | Refills: 0 | Status: DISCONTINUED | COMMUNITY
Start: 2017-12-22 | End: 2019-06-17

## 2019-06-17 RX ORDER — SUCRALFATE 1 G/1
1 TABLET ORAL
Qty: 120 | Refills: 3 | Status: DISCONTINUED | COMMUNITY
Start: 2017-04-17 | End: 2019-06-17

## 2019-06-17 NOTE — PHYSICAL EXAM
[General Appearance - In No Acute Distress] : in no acute distress [General Appearance - Alert] : alert [General Appearance - Well Nourished] : well nourished [Extraocular Movements] : extraocular movements were intact [PERRL With Normal Accommodation] : pupils were equal in size, round, and reactive to light [Sclera] : the sclera and conjunctiva were normal [Outer Ear] : the ears and nose were normal in appearance [Nasal Cavity] : the nasal mucosa and septum were normal [Neck Appearance] : the appearance of the neck was normal [Oropharynx] : the oropharynx was normal [Neck Cervical Mass (___cm)] : no neck mass was observed [Thyroid Diffuse Enlargement] : the thyroid was not enlarged [Auscultation Breath Sounds / Voice Sounds] : lungs were clear to auscultation bilaterally [Heart Rate And Rhythm] : heart rate was normal and rhythm regular [Heart Sounds Gallop] : no gallops [Heart Sounds] : normal S1 and S2 [Heart Sounds Pericardial Friction Rub] : no pericardial rub [Murmurs] : no murmurs [Full Pulse] : the pedal pulses are present [Abdomen Soft] : soft [Bowel Sounds] : normal bowel sounds [Edema] : there was no peripheral edema [Abdomen Tenderness] : non-tender [Abdomen Mass (___ Cm)] : no abdominal mass palpated [Cervical Lymph Nodes Enlarged Anterior Bilaterally] : anterior cervical [Cervical Lymph Nodes Enlarged Posterior Bilaterally] : posterior cervical [Supraclavicular Lymph Nodes Enlarged Bilaterally] : supraclavicular [No CVA Tenderness] : no ~M costovertebral angle tenderness [Abnormal Walk] : normal gait [Musculoskeletal - Swelling] : no joint swelling seen [Nail Clubbing] : no clubbing  or cyanosis of the fingernails [Motor Tone] : muscle strength and tone were normal [FreeTextEntry1] : + crepitus over b/l knees, no effusions. Mild OA changes in hands, fullness but no synovitis over L wrist. Remainder of joints normal. L shoulder with full ROM. strength 5/5 in all extremities  [Skin Color & Pigmentation] : normal skin color and pigmentation [Skin Turgor] : normal skin turgor [No Focal Deficits] : no focal deficits [] : no rash [Oriented To Time, Place, And Person] : oriented to person, place, and time [Affect] : the affect was normal [Impaired Insight] : insight and judgment were intact

## 2019-06-17 NOTE — HISTORY OF PRESENT ILLNESS
[FreeTextEntry1] : DRISS GAYTAN is a 78 year old woman who presents with chronic diffuse joint pains x years, progressively worsening. Worst 3 sites are neck/upper back, lower back with radiation down b/l legs, and knees. Reports she feels the best in the morning, pain worsens throughout the day, no significant AM stiffness in low back or peripheral joints. No synovitis, no dx of gout. Pain in L shoulder too, worse s/p mastectomy and LN sampling in 2015. + R 4th trigger finger intermittently but finger never locks or has severe pain. Has not been taking OTC meds, cannot take NSAIDs 2/2 A/C and hx of PUD. Has not had imaging of affected joints. 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 Reported acupuncture helped. Has tried PT and acupuncture for the back with minimal improvement. Pt is very active, walks 2 miles daily. DEXA 2017 normal, no fractures, on Ca/VitD supplementation. \par \par Hx of L sided breast ca s/p surgery in 2015, has been on letrozole since, plan for 5 year treatment. Did feel like arthralgias worsened after she started it but recently did a month discontinuation on advice of her oncologist without significant improvement in joint complaints. + hx of bladder cancer as well, both in remission currently. \par

## 2019-06-17 NOTE — REVIEW OF SYSTEMS
[Negative] : Heme/Lymph [Joint Pain] : joint pain [Arthralgias] : arthralgias [As Noted in HPI] : as noted in HPI

## 2019-06-17 NOTE — ASSESSMENT
[FreeTextEntry1] : DRISS GAYTAN is a 78 year old woman who presents with diffuse joint pains x years. Based on exam, suspect OA in b/l knees, L shoulder, L wrist and spine. Arthralgia symptomatically likely worsened by aromatase inhibitor but as she did not notice significant improvement during the month she stopped it, would not want to discontinue this medication at this time as the benefits outweigh the risks. Minimal improvement with PT and pt cannot take NSAIDs. Paucity of findings suggestive for inflammatory vs crystalline arthritis. \par \par - check XR CTLS spine to eval for OA --discussed if any nerve impingements could consider spinal injection with pain mgmt\par - check XR b/l knees to eval for OA \par - trial of diclofenac gel to knees prn TID, if not improved will consider steroid IA injections at next visit\par - tylenol prn pain, avoid NSAIDs\par - RTC 1 month

## 2019-06-18 ENCOUNTER — OTHER (OUTPATIENT)
Age: 79
End: 2019-06-18

## 2019-06-18 ENCOUNTER — FORM ENCOUNTER (OUTPATIENT)
Age: 79
End: 2019-06-18

## 2019-06-19 ENCOUNTER — APPOINTMENT (OUTPATIENT)
Dept: RADIOLOGY | Facility: CLINIC | Age: 79
End: 2019-06-19
Payer: MEDICARE

## 2019-06-19 ENCOUNTER — OUTPATIENT (OUTPATIENT)
Dept: OUTPATIENT SERVICES | Facility: HOSPITAL | Age: 79
LOS: 1 days | End: 2019-06-19
Payer: MEDICARE

## 2019-06-19 DIAGNOSIS — M17.0 BILATERAL PRIMARY OSTEOARTHRITIS OF KNEE: ICD-10-CM

## 2019-06-19 DIAGNOSIS — M19.032 PRIMARY OSTEOARTHRITIS, LEFT WRIST: ICD-10-CM

## 2019-06-19 DIAGNOSIS — M47.819 SPONDYLOSIS WITHOUT MYELOPATHY OR RADICULOPATHY, SITE UNSPECIFIED: ICD-10-CM

## 2019-06-19 DIAGNOSIS — M25.561 PAIN IN RIGHT KNEE: ICD-10-CM

## 2019-06-19 PROCEDURE — 73562 X-RAY EXAM OF KNEE 3: CPT | Mod: 26,LT,76

## 2019-06-19 PROCEDURE — 72070 X-RAY EXAM THORAC SPINE 2VWS: CPT | Mod: 26

## 2019-06-19 PROCEDURE — 72110 X-RAY EXAM L-2 SPINE 4/>VWS: CPT | Mod: 26

## 2019-06-19 PROCEDURE — 72040 X-RAY EXAM NECK SPINE 2-3 VW: CPT | Mod: 26

## 2019-06-19 PROCEDURE — 72040 X-RAY EXAM NECK SPINE 2-3 VW: CPT

## 2019-06-19 PROCEDURE — 72110 X-RAY EXAM L-2 SPINE 4/>VWS: CPT

## 2019-06-19 PROCEDURE — 73562 X-RAY EXAM OF KNEE 3: CPT

## 2019-06-19 PROCEDURE — 72070 X-RAY EXAM THORAC SPINE 2VWS: CPT

## 2019-06-19 PROCEDURE — 73562 X-RAY EXAM OF KNEE 3: CPT | Mod: 26,RT

## 2019-07-01 ENCOUNTER — APPOINTMENT (OUTPATIENT)
Dept: RHEUMATOLOGY | Facility: CLINIC | Age: 79
End: 2019-07-01
Payer: MEDICARE

## 2019-07-01 VITALS
HEART RATE: 65 BPM | SYSTOLIC BLOOD PRESSURE: 100 MMHG | WEIGHT: 165 LBS | BODY MASS INDEX: 30.36 KG/M2 | DIASTOLIC BLOOD PRESSURE: 60 MMHG | OXYGEN SATURATION: 98 % | HEIGHT: 62 IN

## 2019-07-01 PROCEDURE — 99213 OFFICE O/P EST LOW 20 MIN: CPT

## 2019-07-01 NOTE — PHYSICAL EXAM
[General Appearance - Alert] : alert [General Appearance - In No Acute Distress] : in no acute distress [General Appearance - Well Nourished] : well nourished [Sclera] : the sclera and conjunctiva were normal [PERRL With Normal Accommodation] : pupils were equal in size, round, and reactive to light [Extraocular Movements] : extraocular movements were intact [Oropharynx] : the oropharynx was normal [Neck Appearance] : the appearance of the neck was normal [Neck Cervical Mass (___cm)] : no neck mass was observed [Thyroid Diffuse Enlargement] : the thyroid was not enlarged [Auscultation Breath Sounds / Voice Sounds] : lungs were clear to auscultation bilaterally [Heart Rate And Rhythm] : heart rate was normal and rhythm regular [Heart Sounds] : normal S1 and S2 [Heart Sounds Gallop] : no gallops [Murmurs] : no murmurs [Heart Sounds Pericardial Friction Rub] : no pericardial rub [Full Pulse] : the pedal pulses are present [Edema] : there was no peripheral edema [Bowel Sounds] : normal bowel sounds [Abdomen Soft] : soft [Abdomen Tenderness] : non-tender [No CVA Tenderness] : no ~M costovertebral angle tenderness [Abnormal Walk] : normal gait [Nail Clubbing] : no clubbing  or cyanosis of the fingernails [Musculoskeletal - Swelling] : no joint swelling seen [Motor Tone] : muscle strength and tone were normal [FreeTextEntry1] : + crepitus over b/l knees, no effusions. Mild OA changes in hands, fullness but no synovitis over L wrist. Remainder of joints normal. L shoulder with full ROM. strength 5/5 in all extremities  [Skin Color & Pigmentation] : normal skin color and pigmentation [] : no rash [No Focal Deficits] : no focal deficits [Oriented To Time, Place, And Person] : oriented to person, place, and time [Impaired Insight] : insight and judgment were intact [Affect] : the affect was normal

## 2019-07-01 NOTE — HISTORY OF PRESENT ILLNESS
[FreeTextEntry1] : DRISS GAYTAN is a 78 year old woman here for follow-up of chronic diffuse joint pains x years, worst in lumbar spine with R sided sciatica, neck/upper back, and knees. Exam last visit consistent with OA and XR C spine and knees with mild OA. L spine with advanced DJD with disc narrowing and osteophytes, with prominent one at Left L3-4 but without pain in this area. Reports improvement of R sided pain with PT in past and would like to try again. Diclofenac gel for knees has not helped, nor has tylenol arthritis. Also with chronic L wrist pain 2/2 advanced OA, and R 4th trigger finger intermittently but finger never locks or has severe pain. No other joint pains, no synovitis, no new complaints since last visit. No F/C, recent infections, CP, SOB, abd pain, dysuria. Pt remains very active, walks 2 miles daily. DEXA 2017 normal, no fractures, on Ca/VitD supplementation.

## 2019-07-01 NOTE — ASSESSMENT
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 Milder pain in C spine and knees which pt feels is manageable. Minimal response to topical NSAIDs. Declined IA knee injections today as she feels she can manage without them.   Inquiring about PT for lumbar pain. Remains with paucity of findings suggestive for inflammatory vs crystalline arthritis. \par \par - referral to pain mgmt to consider spinal injection for R sided back pain/sciatica \par - Pt referral  \par - prn diclofenac gel to knees for now, tylenol prn pain, avoid NSAIDs 2/2 A/C -- pt does not want to consider IA injections at this time, discussed that should she want to try them to make an appointment at that time\par - Prior DEXA normal, to be followed by PMD\par - RTC prn

## 2019-07-23 ENCOUNTER — APPOINTMENT (OUTPATIENT)
Dept: CARDIOLOGY | Facility: CLINIC | Age: 79
End: 2019-07-23
Payer: MEDICARE

## 2019-07-23 ENCOUNTER — NON-APPOINTMENT (OUTPATIENT)
Age: 79
End: 2019-07-23

## 2019-07-23 ENCOUNTER — INPATIENT (INPATIENT)
Facility: HOSPITAL | Age: 79
LOS: 2 days | Discharge: HOME | End: 2019-07-26
Attending: INTERNAL MEDICINE | Admitting: INTERNAL MEDICINE
Payer: MEDICARE

## 2019-07-23 VITALS — DIASTOLIC BLOOD PRESSURE: 60 MMHG | OXYGEN SATURATION: 100 % | HEART RATE: 42 BPM | SYSTOLIC BLOOD PRESSURE: 110 MMHG

## 2019-07-23 VITALS
BODY MASS INDEX: 30.36 KG/M2 | SYSTOLIC BLOOD PRESSURE: 90 MMHG | RESPIRATION RATE: 14 BRPM | DIASTOLIC BLOOD PRESSURE: 62 MMHG | WEIGHT: 165 LBS | HEIGHT: 62 IN | OXYGEN SATURATION: 99 % | HEART RATE: 51 BPM | TEMPERATURE: 97.8 F

## 2019-07-23 VITALS
DIASTOLIC BLOOD PRESSURE: 57 MMHG | SYSTOLIC BLOOD PRESSURE: 120 MMHG | HEART RATE: 46 BPM | TEMPERATURE: 98 F | RESPIRATION RATE: 17 BRPM | OXYGEN SATURATION: 99 %

## 2019-07-23 DIAGNOSIS — R79.89 OTHER SPECIFIED ABNORMAL FINDINGS OF BLOOD CHEMISTRY: ICD-10-CM

## 2019-07-23 DIAGNOSIS — Z87.898 PERSONAL HISTORY OF OTHER SPECIFIED CONDITIONS: ICD-10-CM

## 2019-07-23 DIAGNOSIS — M19.032 PRIMARY OSTEOARTHRITIS, LEFT WRIST: ICD-10-CM

## 2019-07-23 DIAGNOSIS — M25.562 PAIN IN RIGHT KNEE: ICD-10-CM

## 2019-07-23 DIAGNOSIS — M54.16 RADICULOPATHY, LUMBAR REGION: ICD-10-CM

## 2019-07-23 DIAGNOSIS — I05.9 RHEUMATIC MITRAL VALVE DISEASE, UNSPECIFIED: ICD-10-CM

## 2019-07-23 DIAGNOSIS — Z86.79 PERSONAL HISTORY OF OTHER DISEASES OF THE CIRCULATORY SYSTEM: ICD-10-CM

## 2019-07-23 DIAGNOSIS — Z87.09 PERSONAL HISTORY OF OTHER DISEASES OF THE RESPIRATORY SYSTEM: ICD-10-CM

## 2019-07-23 DIAGNOSIS — Z87.39 PERSONAL HISTORY OF OTHER DISEASES OF THE MUSCULOSKELETAL SYSTEM AND CONNECTIVE TISSUE: ICD-10-CM

## 2019-07-23 DIAGNOSIS — I10 ESSENTIAL (PRIMARY) HYPERTENSION: ICD-10-CM

## 2019-07-23 DIAGNOSIS — R06.09 OTHER FORMS OF DYSPNEA: ICD-10-CM

## 2019-07-23 DIAGNOSIS — I51.89 OTHER ILL-DEFINED HEART DISEASES: ICD-10-CM

## 2019-07-23 DIAGNOSIS — G89.29 PAIN IN RIGHT KNEE: ICD-10-CM

## 2019-07-23 DIAGNOSIS — Z90.710 ACQUIRED ABSENCE OF BOTH CERVIX AND UTERUS: Chronic | ICD-10-CM

## 2019-07-23 DIAGNOSIS — Z86.39 PERSONAL HISTORY OF OTHER ENDOCRINE, NUTRITIONAL AND METABOLIC DISEASE: ICD-10-CM

## 2019-07-23 DIAGNOSIS — M25.561 PAIN IN RIGHT KNEE: ICD-10-CM

## 2019-07-23 DIAGNOSIS — M17.0 BILATERAL PRIMARY OSTEOARTHRITIS OF KNEE: ICD-10-CM

## 2019-07-23 DIAGNOSIS — R00.1 BRADYCARDIA, UNSPECIFIED: ICD-10-CM

## 2019-07-23 DIAGNOSIS — I49.8 OTHER SPECIFIED CARDIAC ARRHYTHMIAS: ICD-10-CM

## 2019-07-23 DIAGNOSIS — Z87.19 PERSONAL HISTORY OF OTHER DISEASES OF THE DIGESTIVE SYSTEM: ICD-10-CM

## 2019-07-23 DIAGNOSIS — M47.812 SPONDYLOSIS W/OUT MYELOPATHY OR RADICULOPATHY, CERVICAL REGION: ICD-10-CM

## 2019-07-23 LAB
ALBUMIN SERPL ELPH-MCNC: 4.1 G/DL — SIGNIFICANT CHANGE UP (ref 3.3–5)
ALP SERPL-CCNC: 95 U/L — SIGNIFICANT CHANGE UP (ref 40–120)
ALT FLD-CCNC: 25 U/L — SIGNIFICANT CHANGE UP (ref 12–78)
ANION GAP SERPL CALC-SCNC: 8 MMOL/L — SIGNIFICANT CHANGE UP (ref 5–17)
APTT BLD: 37 SEC — HIGH (ref 27.5–36.3)
AST SERPL-CCNC: 22 U/L — SIGNIFICANT CHANGE UP (ref 15–37)
BILIRUB SERPL-MCNC: 0.3 MG/DL — SIGNIFICANT CHANGE UP (ref 0.2–1.2)
BUN SERPL-MCNC: 41 MG/DL — HIGH (ref 7–23)
CALCIUM SERPL-MCNC: 9.7 MG/DL — SIGNIFICANT CHANGE UP (ref 8.5–10.1)
CHLORIDE SERPL-SCNC: 108 MMOL/L — SIGNIFICANT CHANGE UP (ref 96–108)
CO2 SERPL-SCNC: 22 MMOL/L — SIGNIFICANT CHANGE UP (ref 22–31)
CREAT SERPL-MCNC: 1.43 MG/DL — HIGH (ref 0.5–1.3)
GLUCOSE SERPL-MCNC: 84 MG/DL — SIGNIFICANT CHANGE UP (ref 70–99)
HCT VFR BLD CALC: 34.7 % — SIGNIFICANT CHANGE UP (ref 34.5–45)
HGB BLD-MCNC: 11.2 G/DL — LOW (ref 11.5–15.5)
INR BLD: 1.6 RATIO — HIGH (ref 0.88–1.16)
MAGNESIUM SERPL-MCNC: 2.4 MG/DL — SIGNIFICANT CHANGE UP (ref 1.6–2.6)
MCHC RBC-ENTMCNC: 30.9 PG — SIGNIFICANT CHANGE UP (ref 27–34)
MCHC RBC-ENTMCNC: 32.3 GM/DL — SIGNIFICANT CHANGE UP (ref 32–36)
MCV RBC AUTO: 95.6 FL — SIGNIFICANT CHANGE UP (ref 80–100)
PLATELET # BLD AUTO: 173 K/UL — SIGNIFICANT CHANGE UP (ref 150–400)
POTASSIUM SERPL-MCNC: 5.5 MMOL/L — HIGH (ref 3.5–5.3)
POTASSIUM SERPL-SCNC: 5.5 MMOL/L — HIGH (ref 3.5–5.3)
PROT SERPL-MCNC: 7.7 GM/DL — SIGNIFICANT CHANGE UP (ref 6–8.3)
PROTHROM AB SERPL-ACNC: 18 SEC — HIGH (ref 10–12.9)
RBC # BLD: 3.63 M/UL — LOW (ref 3.8–5.2)
RBC # FLD: 12.6 % — SIGNIFICANT CHANGE UP (ref 10.3–14.5)
SODIUM SERPL-SCNC: 138 MMOL/L — SIGNIFICANT CHANGE UP (ref 135–145)
TROPONIN I SERPL-MCNC: <0.015 NG/ML — SIGNIFICANT CHANGE UP (ref 0.01–0.04)
WBC # BLD: 6.71 K/UL — SIGNIFICANT CHANGE UP (ref 3.8–10.5)
WBC # FLD AUTO: 6.71 K/UL — SIGNIFICANT CHANGE UP (ref 3.8–10.5)

## 2019-07-23 PROCEDURE — 99215 OFFICE O/P EST HI 40 MIN: CPT | Mod: 25

## 2019-07-23 PROCEDURE — 71045 X-RAY EXAM CHEST 1 VIEW: CPT | Mod: 26

## 2019-07-23 PROCEDURE — 99285 EMERGENCY DEPT VISIT HI MDM: CPT

## 2019-07-23 PROCEDURE — 93010 ELECTROCARDIOGRAM REPORT: CPT

## 2019-07-23 PROCEDURE — 93000 ELECTROCARDIOGRAM COMPLETE: CPT

## 2019-07-23 RX ORDER — VERAPAMIL HCL 240 MG
1 CAPSULE, EXTENDED RELEASE PELLETS 24 HR ORAL
Qty: 0 | Refills: 0 | DISCHARGE

## 2019-07-23 RX ORDER — SODIUM POLYSTYRENE SULFONATE 4.1 MEQ/G
15 POWDER, FOR SUSPENSION ORAL ONCE
Refills: 0 | Status: COMPLETED | OUTPATIENT
Start: 2019-07-23 | End: 2019-07-23

## 2019-07-23 RX ORDER — ATENOLOL 25 MG/1
1 TABLET ORAL
Qty: 0 | Refills: 0 | DISCHARGE

## 2019-07-23 RX ORDER — PANTOPRAZOLE SODIUM 20 MG/1
40 TABLET, DELAYED RELEASE ORAL
Refills: 0 | Status: DISCONTINUED | OUTPATIENT
Start: 2019-07-23 | End: 2019-07-26

## 2019-07-23 RX ORDER — ACETAMINOPHEN 500 MG
3 TABLET ORAL
Qty: 0 | Refills: 0 | DISCHARGE

## 2019-07-23 RX ORDER — HYDRALAZINE HCL 50 MG
50 TABLET ORAL
Refills: 0 | Status: DISCONTINUED | OUTPATIENT
Start: 2019-07-23 | End: 2019-07-26

## 2019-07-23 RX ORDER — ACETAMINOPHEN 500 MG
650 TABLET ORAL EVERY 6 HOURS
Refills: 0 | Status: DISCONTINUED | OUTPATIENT
Start: 2019-07-23 | End: 2019-07-26

## 2019-07-23 RX ORDER — CHOLECALCIFEROL (VITAMIN D3) 125 MCG
1 CAPSULE ORAL
Qty: 0 | Refills: 0 | DISCHARGE

## 2019-07-23 RX ORDER — HYDRALAZINE HCL 50 MG
1 TABLET ORAL
Qty: 0 | Refills: 0 | DISCHARGE

## 2019-07-23 RX ORDER — LETROZOLE 2.5 MG/1
2.5 TABLET, FILM COATED ORAL DAILY
Refills: 0 | Status: DISCONTINUED | OUTPATIENT
Start: 2019-07-23 | End: 2019-07-26

## 2019-07-23 RX ADMIN — SODIUM POLYSTYRENE SULFONATE 15 GRAM(S): 4.1 POWDER, FOR SUSPENSION ORAL at 19:44

## 2019-07-23 NOTE — ED PROVIDER NOTE - PMH
Afib    Arthritis    Asthma    Carcinoma of bladder    Chronic back pain    Chronic fatigue    Chronic knee pain    Diverticulitis    GERD (gastroesophageal reflux disease)    HLD (hyperlipidemia)    HTN (hypertension)    Low vitamin D level    Lumbar back pain with radiculopathy affecting right lower extremity    Malignant neoplasm of breast    Sciatica

## 2019-07-23 NOTE — H&P ADULT - NSHPPHYSICALEXAM_GEN_ALL_CORE
Vital Signs Last 24 Hrs  T(C): 36.5 (23 Jul 2019 16:39), Max: 36.5 (23 Jul 2019 16:39)  T(F): 97.7 (23 Jul 2019 16:39), Max: 97.7 (23 Jul 2019 16:39)  HR: 45 (23 Jul 2019 19:16) (45 - 46)  BP: 122/57 (23 Jul 2019 19:16) (120/57 - 122/57)  RR: 17 (23 Jul 2019 19:16) (17 - 17)  SpO2: 100% (23 Jul 2019 19:16) (99% - 100%)

## 2019-07-23 NOTE — DISCUSSION/SUMMARY
[FreeTextEntry1] : Symptoms sound most c/w bradycardia and whether it is soley medication related or the result of sick sinus syndrome. In addition, Hydralazine can have similair side effects and she reports when she has missed doses ofr it she has felt better. \par \par Plan: spoke with Dr. thomas from [facility] who will consult. Hold atenolol. Dc hydralazine . admit to telemitry.  Will continue verapmil for now as will need something to prevent her from going into afib/flutter.

## 2019-07-23 NOTE — H&P ADULT - NEUROLOGICAL DETAILS
sensation intact/deep reflexes intact/responds to verbal commands/normal strength/alert and oriented x 3/responds to pain

## 2019-07-23 NOTE — CONSULT NOTE ADULT - SUBJECTIVE AND OBJECTIVE BOX
This 77 y/o is followed by Dr. Escobar for AT [facility] and has been on Xarelto  She is complaining of worsening of SOB over the last few weeks. She has also been on AV tenisha blocking agents. She was sent over for severe Bradycardia with junctional escape beats. Her LVEF is 65% based on echo 2018..                      Cardiology Summary    EK19, Severe Sinus bradycardia with junctional escape beat,   Stress Test: 2018, no Ischemia, no exercise induced arrhythmias, no Symptoms, NL perfusion   Echo: 2018, LVH, +1-2 TR, 1-2 +MR, DD, normal LV function, mild pulmonary hypertension, enlarged LA size LVEF 65%.   Cardiac Cath: 2016, Nonobstructive coronary disease with normal LV function.      Active Problems  Diastolic dysfunction (429.9) (I51.89)  History of atrial fibrillation (V12.59) (Z86.79)  History of hyperlipidemia (V12.29) (Z86.39)  History of pulmonary hypertension (V12.59) (Z86.79)   · secondary to DD.  Hypertension (401.9) (I10)  Mitral valve disease (394.9) (I05.9)  Asthma (493.90) (J45.909)          Surgical History  History of Complete Colonoscopy  History of Mastectomy Left Breast  History of Vaginal Hysterectomy    Family History  Family history of acute myocardial infarction (V17.3) (Z82.49) : Sister  Family history of Hypertension (V17.49) : Brother  Family history of Stroke Syndrome (V17.1) : Father  Family history of Cancer : Daughter, Sister  Family history of Alzheimer's disease (V17.2) (Z82.0) : Mother  Family history of asthma (V17.5) (Z82.5) : Mother    Social History  Being A Social Drinker  Never a smoker  No drug use  Retired            MEDICATIONS  (STANDING):    MEDICATIONS  (PRN):      Allergies    codeine (Unknown)  statins (Unknown)    Intolerances        SOCIAL HISTORY: Denies tobacco, etoh abuse or illicit drug use    FAMILY HISTORY:      Vital Signs Last 24 Hrs  T(C): 36.5 (2019 16:39), Max: 36.5 (2019 16:39)  T(F): 97.7 (2019 16:39), Max: 97.7 (2019 16:39)  HR: 46 (2019 16:39) (46 - 46)  BP: 120/57 (2019 16:39) (120/57 - 120/57)  BP(mean): --  RR: 17 (2019 16:39) (17 - 17)  SpO2: 99% (2019 16:39) (99% - 99%)    REVIEW OF SYSTEMS:    CONSTITUTIONAL:  As per HPI.  HEENT:  Eyes:  No diplopia or blurred vision. ENT:  No earache, sore throat or runny nose.  CARDIOVASCULAR:  No pressure, squeezing, strangling, tightness, heaviness or aching about the chest, neck, axilla or epigastrium.  RESPIRATORY:  No cough, shortness of breath, PND or orthopnea.  GASTROINTESTINAL:  No nausea, vomiting or diarrhea.  GENITOURINARY:  No dysuria, frequency or urgency.  MUSCULOSKELETAL:  As per HPI.  SKIN:  No change in skin, hair or nails.  NEUROLOGIC:  No paresthesias, fasciculations, seizures or weakness.  PSYCHIATRIC:  No disorder of thought or mood.  ENDOCRINE:  No heat or cold intolerance, polyuria or polydipsia.  HEMATOLOGICAL:  No easy bruising or bleedings:  .     PHYSICAL EXAMINATION:    GENERAL APPEARANCE:  Pt. is not currently dyspneic, in no distress. Pt. is alert, oriented, and pleasant.  HEENT:  Pupils are normal and react normally. No icterus. Mucous membranes well colored.  NECK:  Supple. No lymphadenopathy. Jugular venous pressure not elevated. Carotids equal.   HEART:   The cardiac impulse has a normal quality. There are no murmurs, rubs or gallops noted  CHEST:  Chest is clear to auscultation. Normal respiratory effort.  ABDOMEN:  Soft and nontender.   EXTREMITIES:  There is no edema.   SKIN:  No rash or significant lesions are noted.    I&O's Summary      LABS:                        EKG:    TELEMETRY:    CARDIAC TESTS:    RADIOLOGY & ADDITIONAL STUDIES: This 79 y/o is [facility]. [facility] and has been on Xarelto  She is complaining of worsening of SOB over the last few weeks. She has also been on AV tenisha blocking agents. She was sent over for severe Bradycardia with junctional escape beats. Her LVEF is 65% based on echo 2018.                      Cardiology Summary    EK19, Severe Sinus bradycardia with junctional escape beat,   Stress Test: 2018, no Ischemia, no exercise induced arrhythmias, no Symptoms, NL perfusion   Echo: 2018, LVH, +1-2 TR, 1-2 +MR, DD, normal LV function, mild pulmonary hypertension, enlarged LA size LVEF 65%.   Cardiac Cath: 2016, Nonobstructive coronary disease with normal LV function.      Active Problems  Diastolic dysfunction (429.9) (I51.89)  History of atrial fibrillation (V12.59) (Z86.79)  History of hyperlipidemia (V12.29) (Z86.39)  History of pulmonary hypertension (V12.59) (Z86.79)   · secondary to DD.  Hypertension (401.9) (I10)  Mitral valve disease (394.9) (I05.9)  Asthma (493.90) (J45.909)          Surgical History  History of Complete Colonoscopy  History of Mastectomy Left Breast  History of Vaginal Hysterectomy    Family History  Family history of acute myocardial infarction (V17.3) (Z82.49) : Sister  Family history of Hypertension (V17.49) : Brother  Family history of Stroke Syndrome (V17.1) : Father  Family history of Cancer : Daughter, Sister  Family history of Alzheimer's disease (V17.2) (Z82.0) : Mother  Family history of asthma (V17.5) (Z82.5) : Mother    Social History  Being A Social Drinker  Never a smoker  No drug use  Retired            MEDICATIONS  (STANDING):    MEDICATIONS  (PRN):      Allergies    codeine (Unknown)  statins (Unknown)    Intolerances        SOCIAL HISTORY: Denies tobacco, etoh abuse or illicit drug use    FAMILY HISTORY:      Vital Signs Last 24 Hrs  T(C): 36.5 (2019 16:39), Max: 36.5 (2019 16:39)  T(F): 97.7 (2019 16:39), Max: 97.7 (2019 16:39)  HR: 46 (2019 16:39) (46 - 46)  BP: 120/57 (2019 16:39) (120/57 - 120/57)  BP(mean): --  RR: 17 (2019 16:39) (17 - 17)  SpO2: 99% (2019 16:39) (99% - 99%)    REVIEW OF SYSTEMS:    CONSTITUTIONAL:  As per HPI.  HEENT:  Eyes:  No diplopia or blurred vision. ENT:  No earache, sore throat or runny nose.  CARDIOVASCULAR:  No pressure, squeezing, strangling, tightness, heaviness or aching about the chest, neck, axilla or epigastrium.  RESPIRATORY:  No cough, shortness of breath, PND or orthopnea.  GASTROINTESTINAL:  No nausea, vomiting or diarrhea.  GENITOURINARY:  No dysuria, frequency or urgency.  MUSCULOSKELETAL:  As per HPI.  SKIN:  No change in skin, hair or nails.  NEUROLOGIC:  No paresthesias, fasciculations, seizures or weakness.  PSYCHIATRIC:  No disorder of thought or mood.  ENDOCRINE:  No heat or cold intolerance, polyuria or polydipsia.  HEMATOLOGICAL:  No easy bruising or bleedings:  .     PHYSICAL EXAMINATION:    GENERAL APPEARANCE:  Pt. is not currently dyspneic, in no distress. Pt. is alert, oriented, and pleasant.  HEENT:  Pupils are normal and react normally. No icterus. Mucous membranes well colored.  NECK:  Supple. No lymphadenopathy. Jugular venous pressure not elevated. Carotids equal.   HEART:   The cardiac impulse has a normal quality. There are no murmurs, rubs or gallops noted  CHEST:  Chest is clear to auscultation. Normal respiratory effort.  ABDOMEN:  Soft and nontender.   EXTREMITIES:  There is no edema.   SKIN:  No rash or significant lesions are noted.    I&O's Summary      LABS:                        EKG:    TELEMETRY:    CARDIAC TESTS:    RADIOLOGY & ADDITIONAL STUDIES: This 79 y/o is followed by Dr. Escobar for AT [facility] and has been on Xarelto  She is complaining of worsening of SOB  and dizziness over the last few weeks. She has also been on AV tenisha blocking agents. She was sent over for severe Bradycardia with junctional escape beats. Her LVEF is 65% based on echo 2018.        Cardiology Summary    EK19, Severe Sinus bradycardia with junctional escape beat,   Stress Test: 2018, no Ischemia, no exercise induced arrhythmias, no Symptoms, NL perfusion   Echo: 2018, LVH, +1-2 TR, 1-2 +MR, DD, normal LV function, mild pulmonary hypertension, enlarged LA size LVEF 65%.   Cardiac Cath: 2016, Nonobstructive coronary disease with normal LV function.      Active Problems  Diastolic dysfunction (429.9) (I51.89)  History of atrial fibrillation (V12.59) (Z86.79)  History of hyperlipidemia (V12.29) (Z86.39)  History of pulmonary hypertension (V12.59) (Z86.79)   · secondary to DD.  Hypertension (401.9) (I10)  Mitral valve disease (394.9) (I05.9)  Asthma (493.90) (J45.909)          Surgical History  History of Complete Colonoscopy  History of Mastectomy Left Breast  History of Vaginal Hysterectomy    Family History  Family history of acute myocardial infarction (V17.3) (Z82.49) : Sister  Family history of Hypertension (V17.49) : Brother  Family history of Stroke Syndrome (V17.1) : Father  Family history of Cancer : Daughter, Sister  Family history of Alzheimer's disease (V17.2) (Z82.0) : Mother  Family history of asthma (V17.5) (Z82.5) : Mother    Social History  Being A Social Drinker  Never a smoker  No drug use  Retired,       MEDICATIONS  (STANDING):    MEDICATIONS  (PRN):      Allergies    codeine (Unknown)  statins (Unknown)    Intolerances      Vital Signs Last 24 Hrs  T(C): 36.5 (2019 16:39), Max: 36.5 (2019 16:39)  T(F): 97.7 (2019 16:39), Max: 97.7 (2019 16:39)  HR: 46 (2019 16:39) (46 - 46)  BP: 120/57 (2019 16:39) (120/57 - 120/57)  BP(mean): --  RR: 17 (2019 16:39) (17 - 17)  SpO2: 99% (2019 16:39) (99% - 99%)    REVIEW OF SYSTEMS:    CONSTITUTIONAL:  As per HPI.  HEENT:  Eyes:  No diplopia or blurred vision. ENT:  No earache, sore throat or runny nose.  CARDIOVASCULAR:  No pressure, squeezing, strangling, tightness, heaviness or aching about the chest, neck, axilla or epigastrium.  RESPIRATORY:  No cough, shortness of breath, PND or orthopnea.  GASTROINTESTINAL:  No nausea, vomiting or diarrhea.  GENITOURINARY:  No dysuria, frequency or urgency.  MUSCULOSKELETAL:  As per HPI.  SKIN:  No change in skin, hair or nails.  NEUROLOGIC:  No paresthesias, fasciculations, seizures or weakness.  PSYCHIATRIC:  No disorder of thought or mood.  ENDOCRINE:  No heat or cold intolerance, polyuria or polydipsia.  HEMATOLOGICAL:  No easy bruising or bleedings:  .     PHYSICAL EXAMINATION:    GENERAL APPEARANCE:  Pt. is not currently dyspneic, in no distress. Pt. is alert, oriented, and pleasant.  HEENT:  Pupils are normal and react normally. No icterus. Mucous membranes well colored.  NECK:  Supple. No lymphadenopathy. Jugular venous pressure not elevated. Carotids equal.   HEART:   The cardiac impulse has a normal quality. There are no murmurs, rubs or gallops noted  CHEST:  Chest is clear to auscultation. Normal respiratory effort.  ABDOMEN:  Soft and nontender.   EXTREMITIES:  There is no edema.   SKIN:  No rash or significant lesions are noted.    I&O's Summary      LABS:              EKG:  SB with IVCD, Heart Rate 41 BPM    TELEMETRY:  SB, heart rate mainly 40 BPM at times it drops to 30 BPM    CARDIAC TESTS:    RADIOLOGY & ADDITIONAL STUDIES:

## 2019-07-23 NOTE — ED ADULT NURSE REASSESSMENT NOTE - NS ED NURSE REASSESS COMMENT FT1
received report from rn stanton- pt resting comfortably, vitals stable, asymptomatic, awaiting orders for transfer to unit

## 2019-07-23 NOTE — H&P ADULT - ASSESSMENT
79 yo Female presented with severe bradycardia.    A/P:    1.  Bradycardia  Dizziness  -hold Atenolol and verapamil  -will follow in CICU  -follow cardiology consult  -Possible Pacemaker placement at AM    2.  HTN  -follow BP and adjust meds as needed     3.  SCD for DVT ppx

## 2019-07-23 NOTE — HISTORY OF PRESENT ILLNESS
[FreeTextEntry1] : C/o of sever SOB and dizziness for last few weeks, also difficult to take a deep breath.  O2 Sat in office 100% on room air.  ECG shows severe bradycardia and junctional escape beats.  \par

## 2019-07-23 NOTE — H&P ADULT - HISTORY OF PRESENT ILLNESS
79 yo Female with PMHx of breast CA, bladder CA, asthma, HTN, HLD, diverticulitis, arthritis and AFib presents to the ED with complain of shortness of breath and dizziness. Patient was sent in by Dr. Escobar for bradycardia. She is on currently taking Atenolol. No chest pain. No shortness of breath. No fever.

## 2019-07-23 NOTE — ED PROVIDER NOTE - OBJECTIVE STATEMENT
77 y/o female with PMHx of breast CA, bladder CA, asthma, HTN, HLD, diverticulitis, arthritis and AFib presents to the ED c/o SOB and dizziness. Pt was sent in by Dr. Escobar for bradycardia. Currently taking Atenolol.

## 2019-07-23 NOTE — CONSULT NOTE ADULT - ASSESSMENT
ASSESSMENT & PLAN:  79 y/o female with AT Fib on Xarelto with severe bradycardia, her CC is SOB.      Thank-you for letting the EP Service  participate in the care of your patient. ASSESSMENT & PLAN:  79 y/o female with AT Fib on Xarelto with severe bradycardia, her CC is SOB. She has known  diastolic dysfunction  She was seen by Dr. Serrano who discussed the following plan with this pt, the pts  and daughter.  She has hd a left sided mastectomy in the past.      Hold All AV Blocking agents for this pt  Admit to hospitalist  service and CICU  Maintain normal lytes and Mg level  Hold Xarelto   Prepare for possible PPM on Thursday (transvenous vs possible MICRA), will be left sided implant   Keep Pacer pads in place Pt should NPO on 7/25/2019 to prepare for possible PPM      Thank-you for letting the EP Service  participate in the care of your patient.

## 2019-07-23 NOTE — ED ADULT NURSE NOTE - OBJECTIVE STATEMENT
Pt was at Dr. Escobar's office for routine visit, found to have HR in 30's.  Pt is asymptomatic at this time but states she did have episodes of dizziness in last week.

## 2019-07-24 DIAGNOSIS — E78.2 MIXED HYPERLIPIDEMIA: ICD-10-CM

## 2019-07-24 DIAGNOSIS — I10 ESSENTIAL (PRIMARY) HYPERTENSION: ICD-10-CM

## 2019-07-24 DIAGNOSIS — R00.1 BRADYCARDIA, UNSPECIFIED: ICD-10-CM

## 2019-07-24 LAB
ANION GAP SERPL CALC-SCNC: 9 MMOL/L — SIGNIFICANT CHANGE UP (ref 5–17)
BUN SERPL-MCNC: 37 MG/DL — HIGH (ref 7–23)
CALCIUM SERPL-MCNC: 9.7 MG/DL — SIGNIFICANT CHANGE UP (ref 8.5–10.1)
CHLORIDE SERPL-SCNC: 110 MMOL/L — HIGH (ref 96–108)
CO2 SERPL-SCNC: 22 MMOL/L — SIGNIFICANT CHANGE UP (ref 22–31)
CREAT SERPL-MCNC: 1.41 MG/DL — HIGH (ref 0.5–1.3)
GLUCOSE SERPL-MCNC: 88 MG/DL — SIGNIFICANT CHANGE UP (ref 70–99)
POTASSIUM SERPL-MCNC: 4.9 MMOL/L — SIGNIFICANT CHANGE UP (ref 3.5–5.3)
POTASSIUM SERPL-SCNC: 4.9 MMOL/L — SIGNIFICANT CHANGE UP (ref 3.5–5.3)
SODIUM SERPL-SCNC: 141 MMOL/L — SIGNIFICANT CHANGE UP (ref 135–145)

## 2019-07-24 PROCEDURE — 99223 1ST HOSP IP/OBS HIGH 75: CPT

## 2019-07-24 RX ADMIN — LETROZOLE 2.5 MILLIGRAM(S): 2.5 TABLET, FILM COATED ORAL at 12:06

## 2019-07-24 NOTE — PROGRESS NOTE ADULT - SUBJECTIVE AND OBJECTIVE BOX
This 77 y/o is followed by Dr. Escobar for AT [facility] and has been on Xarelto  She is complaining of worsening of SOB  and dizziness over the last few weeks. She has also been on AV tenisha blocking agents. She was sent over for severe Bradycardia with junctional escape beats. Her LVEF is 65% based on echo 2018.    19:  Denies any CP, SOB, dizziness or lightheadedness today.  Atenolol and Xarelto discontinued.    TELE: sinus bradycardia 40-50 bpm      MEDICATIONS  (STANDING):  hydrALAZINE 50 milliGRAM(s) Oral two times a day  letrozole 2.5 milliGRAM(s) Oral daily  pantoprazole    Tablet 40 milliGRAM(s) Oral before breakfast    MEDICATIONS  (PRN):  acetaminophen   Tablet .. 650 milliGRAM(s) Oral every 6 hours PRN Temp greater or equal to 38C (100.4F), Mild Pain (1 - 3)    Allergies          codeine (Vomiting)  statins (Unknown)    Intolerances      Vital Signs Last 24 Hrs  T(C): 36.3 (2019 06:59), Max: 37.2 (2019 23:45)  T(F): 97.3 (2019 06:59), Max: 98.9 (2019 23:45)  HR: 53 (2019 10:00) (40 - 67)  BP: 121/47 (2019 10:00) (90/32 - 135/50)  BP(mean): 65 (2019 10:00) (46 - 71)  RR: 13 (2019 06:00) (13 - 21)  SpO2: 98% (2019 23:00) (96% - 100%)                            11.2   6.71  )-----------( 173      ( 2019 16:47 )             34.7   07-24    141  |  110<H>  |  37<H>  ----------------------------<  88  4.9   |  22  |  1.41<H>    Ca    9.7      2019 06:57  Mg     2.4     07-    TPro  7.7  /  Alb  4.1  /  TBili  0.3  /  DBili  x   /  AST  22  /  ALT  25  /  AlkPhos  95  07-    CARDIAC MARKERS ( 2019 16:47 )  <0.015 ng/mL / x     / x     / x     / x          REVIEW OF SYSTEMS:    CONSTITUTIONAL:  As per HPI.  HEENT:  Eyes:  No diplopia or blurred vision. ENT:  No earache, sore throat or runny nose.  CARDIOVASCULAR:  No pressure, squeezing, strangling, tightness, heaviness or aching about the chest, neck, axilla or epigastrium.  RESPIRATORY:  No cough, shortness of breath, PND or orthopnea.  GASTROINTESTINAL:  No nausea, vomiting or diarrhea.  GENITOURINARY:  No dysuria, frequency or urgency.  MUSCULOSKELETAL:  As per HPI.  SKIN:  No change in skin, hair or nails.  NEUROLOGIC:  No paresthesias, fasciculations, seizures or weakness.  PSYCHIATRIC:  No disorder of thought or mood.  ENDOCRINE:  No heat or cold intolerance, polyuria or polydipsia.  HEMATOLOGICAL:  No easy bruising or bleedings:  .     PHYSICAL EXAMINATION:    GENERAL APPEARANCE:  Pt. is not currently dyspneic, in no distress. Pt. is alert, oriented, and pleasant.  HEENT:  Pupils are normal and react normally. No icterus. Mucous membranes well colored.  NECK:  Supple. No lymphadenopathy. Jugular venous pressure not elevated. Carotids equal.   HEART:   The cardiac impulse has a normal quality. There are no murmurs, rubs or gallops noted  CHEST:  Chest is clear to auscultation. Normal respiratory effort.  ABDOMEN:  Soft and nontender.   EXTREMITIES:  There is no edema.   SKIN:  No rash or significant lesions are noted.        Cardiology Summary    EK19, Severe Sinus bradycardia with junctional escape beat,   Stress Test: 2018, no Ischemia, no exercise induced arrhythmias, no Symptoms, NL perfusion   Echo: 2018, LVH, +1-2 TR, 1-2 +MR, DD, normal LV function, mild pulmonary hypertension, enlarged LA size LVEF 65%.   Cardiac Cath: 2016, Nonobstructive coronary disease with normal LV function.

## 2019-07-24 NOTE — PROGRESS NOTE ADULT - SUBJECTIVE AND OBJECTIVE BOX
Patient is a 78y old  Female who presents with a chief complaint of complain of dizziness (24 Jul 2019 10:50)      Patient seen and examined at bedside. No overnight events reported. awaiting ppm placement via ep cardiology in am     ALLERGIES:  codeine (Vomiting)  statins (Unknown)    MEDICATIONS  (STANDING):  hydrALAZINE 50 milliGRAM(s) Oral two times a day  letrozole 2.5 milliGRAM(s) Oral daily  pantoprazole    Tablet 40 milliGRAM(s) Oral before breakfast    MEDICATIONS  (PRN):  acetaminophen   Tablet .. 650 milliGRAM(s) Oral every 6 hours PRN Temp greater or equal to 38C (100.4F), Mild Pain (1 - 3)    Vital Signs Last 24 Hrs  T(F): 97.3 (24 Jul 2019 06:59), Max: 98.9 (23 Jul 2019 23:45)  HR: 53 (24 Jul 2019 10:00) (40 - 67)  BP: 121/47 (24 Jul 2019 10:00) (90/32 - 135/50)  RR: 13 (24 Jul 2019 06:00) (13 - 21)  SpO2: 98% (23 Jul 2019 23:00) (96% - 100%)  I&O's Summary    PHYSICAL EXAM:  General: NAD, A/O x 3; elderly  ENT: MMM, no thrush  Neck: Supple, No JVD  Lungs: Clear to auscultation bilaterally, good air entry, non-labored breathing  Cardio: +s1/s2 , No pitting edema  Abdomen: Soft, Nontender, Nondistended; Bowel sounds present  Extremities: No calf tenderness    LABS:                        11.2   6.71  )-----------( 173      ( 23 Jul 2019 16:47 )             34.7     07-24    141  |  110  |  37  ----------------------------<  88  4.9   |  22  |  1.41    Ca    9.7      24 Jul 2019 06:57  Mg     2.4     07-23    TPro  7.7  /  Alb  4.1  /  TBili  0.3  /  DBili  x   /  AST  22  /  ALT  25  /  AlkPhos  95  07-23    eGFR if Non African American: 36 mL/min/1.73M2 (07-24-19 @ 06:57)  eGFR if African American: 42 mL/min/1.73M2 (07-24-19 @ 06:57)    PT/INR - ( 23 Jul 2019 16:47 )   PT: 18.0 sec;   INR: 1.60 ratio    PTT - ( 23 Jul 2019 16:47 )  PTT:37.0 sec    CARDIAC MARKERS ( 23 Jul 2019 16:47 )  <0.015 ng/mL / x     / x     / x     / x        RADIOLOGY & ADDITIONAL TESTS:  < from: Xray Chest 1 View AP/PA. (07.23.19 @ 17:32) >  Impression: No active disease. No change.  < end of copied text >    Care Discussed with Consultants/Other Providers: Cardiology

## 2019-07-24 NOTE — PROGRESS NOTE ADULT - ASSESSMENT
79 y/o female with AT Fib on Xarelto with severe bradycardia, her CC is SOB. She has known  diastolic dysfunction  She was seen by Dr. Serrano who discussed the following plan with this pt, the pts  and daughter.  She has had a left sided mastectomy in the past.

## 2019-07-24 NOTE — PROGRESS NOTE ADULT - ASSESSMENT
#bradycardia q/ dizziness  - Holding blood pressure medications atenolol and verapamil  - Awaiting PPM placement via EP Cardiology in AM  -  NPO After midnight  - Monitor vitals  - cardiology consult appreciated    #hypertension  - monitor blood pressure   - hydralazine    #hx of breast and bladder cancer  - outpatient follow up with heme/onch  - stable  - on letrozole    #HLD  - allergic to statins  - follow up outpatient with cardiology and PCP     #GERD  - pantoprazole  - stable    #DVT prophylaxis  - venodynes

## 2019-07-24 NOTE — CONSULT NOTE ADULT - SUBJECTIVE AND OBJECTIVE BOX
PCP:    REQUESTING PHYSICIAN:    REASON FOR CONSULT:    CHIEF COMPLAINT:    HPI:  77 yo Female with PMHx of breast CA, bladder CA, asthma, HTN, HLD, diverticulitis, arthritis and AFib presents to the ED with complain of shortness of breath and dizziness. Patient was sent in by Dr. Escobar for bradycardia. She is on currently taking Atenolol. No chest pain. No shortness of breath. No fever. (23 Jul 2019 20:02)  7/24/19: Cardiology to follow patient for symptomatic bradycardia. Pt feels well at rest. Monitor reveals sinus bradycardia to the 40s.       PAST MEDICAL & SURGICAL HISTORY:  Chronic knee pain  Carcinoma of bladder  Chronic fatigue  Chronic back pain  Diverticulitis  GERD (gastroesophageal reflux disease)  HLD (hyperlipidemia)  Malignant neoplasm of breast  Sciatica  Low vitamin D level  Lumbar back pain with radiculopathy affecting right lower extremity  Arthritis  Afib  HTN (hypertension)  Asthma  H/O vaginal hysterectomy      Allergies    codeine (Vomiting)  statins (Unknown)    Intolerances        SOCIAL HISTORY:    FAMILY HISTORY:      MEDICATIONS:  MEDICATIONS  (STANDING):  hydrALAZINE 50 milliGRAM(s) Oral two times a day  letrozole 2.5 milliGRAM(s) Oral daily  pantoprazole    Tablet 40 milliGRAM(s) Oral before breakfast    MEDICATIONS  (PRN):  acetaminophen   Tablet .. 650 milliGRAM(s) Oral every 6 hours PRN Temp greater or equal to 38C (100.4F), Mild Pain (1 - 3)      REVIEW OF SYSTEMS:    CONSTITUTIONAL: No weakness, fevers or chills  EYES/ENT: No visual changes;  No vertigo or throat pain   NECK: No pain or stiffness  RESPIRATORY: Shortness of breath  CARDIOVASCULAR: No chest pain or palpitations  GASTROINTESTINAL: No abdominal or epigastric pain. No nausea, vomiting, or hematemesis; No diarrhea or constipation. No melena or hematochezia.  GENITOURINARY: No dysuria, frequency or hematuria  NEUROLOGICAL: Dizziness  SKIN: No itching, burning, rashes, or lesions   All other review of systems is negative unless indicated above    Vital Signs Last 24 Hrs  T(C): 36.3 (24 Jul 2019 06:59), Max: 37.2 (23 Jul 2019 23:45)  T(F): 97.3 (24 Jul 2019 06:59), Max: 98.9 (23 Jul 2019 23:45)  HR: 53 (24 Jul 2019 10:00) (40 - 67)  BP: 121/47 (24 Jul 2019 10:00) (90/32 - 135/50)  BP(mean): 65 (24 Jul 2019 10:00) (46 - 71)  RR: 13 (24 Jul 2019 06:00) (13 - 21)  SpO2: 98% (23 Jul 2019 23:00) (96% - 100%)    I&O's Summary      PHYSICAL EXAM:    Constitutional: NAD, awake and alert, well-developed  HEENT: PERR, EOMI,  No oral cyananosis.  Neck:  supple,  No JVD  Respiratory: Breath sounds are clear bilaterally, No wheezing, rales or rhonchi  Cardiovascular: S1 and S2, bradycardia  Gastrointestinal: Bowel Sounds present, soft, nontender.   Extremities: No peripheral edema. No clubbing or cyanosis.  Vascular: 2+ peripheral pulses  Neurological: A/O x 3, no focal deficits  Musculoskeletal: no calf tenderness.  Skin: No rashes.      LABS: All Labs Reviewed:                        11.2   6.71  )-----------( 173      ( 23 Jul 2019 16:47 )             34.7     24 Jul 2019 06:57    141    |  110    |  37     ----------------------------<  88     4.9     |  22     |  1.41   23 Jul 2019 16:47    138    |  108    |  41     ----------------------------<  84     5.5     |  22     |  1.43     Ca    9.7        24 Jul 2019 06:57  Ca    9.7        23 Jul 2019 16:47  Mg     2.4       23 Jul 2019 16:47    TPro  7.7    /  Alb  4.1    /  TBili  0.3    /  DBili  x      /  AST  22     /  ALT  25     /  AlkPhos  95     23 Jul 2019 16:47    PT/INR - ( 23 Jul 2019 16:47 )   PT: 18.0 sec;   INR: 1.60 ratio         PTT - ( 23 Jul 2019 16:47 )  PTT:37.0 sec  CARDIAC MARKERS ( 23 Jul 2019 16:47 )  <0.015 ng/mL / x     / x     / x     / x          Blood Culture:         RADIOLOGY/EKG:< from: 12 Lead ECG (07.23.19 @ 16:41) >  iagnosis Line Marked sinus bradycardia with marked sinus arrhythmia  Early repolarization  Abnormal ECG  When compared with ECG of 10-AUG-2016 09:35,  No significant change was found  Confirmed by Philippe Galicia MD ([id number]) on 7/24/2019 10:26:36 AM    < end of copied text >

## 2019-07-24 NOTE — PROGRESS NOTE ADULT - PROBLEM SELECTOR PLAN 1
Continuous tele monitoring with pacer pads at bedside.  Hold All AV Blocking agents for this pt  Xarelto on hold for possible PPM Thursday  Keep K>4.0, Mg>2.0  NPO after MN

## 2019-07-25 DIAGNOSIS — I48.0 PAROXYSMAL ATRIAL FIBRILLATION: ICD-10-CM

## 2019-07-25 PROBLEM — M25.569 PAIN IN UNSPECIFIED KNEE: Chronic | Status: ACTIVE | Noted: 2019-07-23

## 2019-07-25 PROBLEM — M54.9 DORSALGIA, UNSPECIFIED: Chronic | Status: ACTIVE | Noted: 2019-07-23

## 2019-07-25 PROBLEM — J45.909 UNSPECIFIED ASTHMA, UNCOMPLICATED: Chronic | Status: ACTIVE | Noted: 2019-07-23

## 2019-07-25 PROBLEM — K21.9 GASTRO-ESOPHAGEAL REFLUX DISEASE WITHOUT ESOPHAGITIS: Chronic | Status: ACTIVE | Noted: 2019-07-23

## 2019-07-25 PROBLEM — I48.91 UNSPECIFIED ATRIAL FIBRILLATION: Chronic | Status: ACTIVE | Noted: 2019-07-23

## 2019-07-25 PROBLEM — K57.92 DIVERTICULITIS OF INTESTINE, PART UNSPECIFIED, WITHOUT PERFORATION OR ABSCESS WITHOUT BLEEDING: Chronic | Status: ACTIVE | Noted: 2019-07-23

## 2019-07-25 PROBLEM — C67.9 MALIGNANT NEOPLASM OF BLADDER, UNSPECIFIED: Chronic | Status: ACTIVE | Noted: 2019-07-23

## 2019-07-25 PROBLEM — R53.82 CHRONIC FATIGUE, UNSPECIFIED: Chronic | Status: ACTIVE | Noted: 2019-07-23

## 2019-07-25 PROBLEM — R79.89 OTHER SPECIFIED ABNORMAL FINDINGS OF BLOOD CHEMISTRY: Chronic | Status: ACTIVE | Noted: 2019-07-23

## 2019-07-25 PROBLEM — M19.90 UNSPECIFIED OSTEOARTHRITIS, UNSPECIFIED SITE: Chronic | Status: ACTIVE | Noted: 2019-07-23

## 2019-07-25 PROBLEM — E78.5 HYPERLIPIDEMIA, UNSPECIFIED: Chronic | Status: ACTIVE | Noted: 2019-07-23

## 2019-07-25 PROBLEM — M54.16 RADICULOPATHY, LUMBAR REGION: Chronic | Status: ACTIVE | Noted: 2019-07-23

## 2019-07-25 PROBLEM — I10 ESSENTIAL (PRIMARY) HYPERTENSION: Chronic | Status: ACTIVE | Noted: 2019-07-23

## 2019-07-25 PROBLEM — C50.919 MALIGNANT NEOPLASM OF UNSPECIFIED SITE OF UNSPECIFIED FEMALE BREAST: Chronic | Status: ACTIVE | Noted: 2019-07-23

## 2019-07-25 PROBLEM — M54.30 SCIATICA, UNSPECIFIED SIDE: Chronic | Status: ACTIVE | Noted: 2019-07-23

## 2019-07-25 LAB
ANION GAP SERPL CALC-SCNC: 9 MMOL/L — SIGNIFICANT CHANGE UP (ref 5–17)
BUN SERPL-MCNC: 37 MG/DL — HIGH (ref 7–23)
CALCIUM SERPL-MCNC: 10 MG/DL — SIGNIFICANT CHANGE UP (ref 8.5–10.1)
CHLORIDE SERPL-SCNC: 108 MMOL/L — SIGNIFICANT CHANGE UP (ref 96–108)
CO2 SERPL-SCNC: 22 MMOL/L — SIGNIFICANT CHANGE UP (ref 22–31)
CREAT SERPL-MCNC: 1.26 MG/DL — SIGNIFICANT CHANGE UP (ref 0.5–1.3)
GLUCOSE SERPL-MCNC: 90 MG/DL — SIGNIFICANT CHANGE UP (ref 70–99)
HCT VFR BLD CALC: 33.5 % — LOW (ref 34.5–45)
HGB BLD-MCNC: 11 G/DL — LOW (ref 11.5–15.5)
MCHC RBC-ENTMCNC: 31 PG — SIGNIFICANT CHANGE UP (ref 27–34)
MCHC RBC-ENTMCNC: 32.8 GM/DL — SIGNIFICANT CHANGE UP (ref 32–36)
MCV RBC AUTO: 94.4 FL — SIGNIFICANT CHANGE UP (ref 80–100)
PLATELET # BLD AUTO: 157 K/UL — SIGNIFICANT CHANGE UP (ref 150–400)
POTASSIUM SERPL-MCNC: 4.2 MMOL/L — SIGNIFICANT CHANGE UP (ref 3.5–5.3)
POTASSIUM SERPL-SCNC: 4.2 MMOL/L — SIGNIFICANT CHANGE UP (ref 3.5–5.3)
RBC # BLD: 3.55 M/UL — LOW (ref 3.8–5.2)
RBC # FLD: 12.5 % — SIGNIFICANT CHANGE UP (ref 10.3–14.5)
SODIUM SERPL-SCNC: 139 MMOL/L — SIGNIFICANT CHANGE UP (ref 135–145)
WBC # BLD: 4.43 K/UL — SIGNIFICANT CHANGE UP (ref 3.8–10.5)
WBC # FLD AUTO: 4.43 K/UL — SIGNIFICANT CHANGE UP (ref 3.8–10.5)

## 2019-07-25 PROCEDURE — 93010 ELECTROCARDIOGRAM REPORT: CPT

## 2019-07-25 PROCEDURE — 99233 SBSQ HOSP IP/OBS HIGH 50: CPT

## 2019-07-25 PROCEDURE — 71045 X-RAY EXAM CHEST 1 VIEW: CPT | Mod: 26

## 2019-07-25 PROCEDURE — 33208 INSRT HEART PM ATRIAL & VENT: CPT | Mod: KX

## 2019-07-25 RX ORDER — CEFAZOLIN SODIUM 1 G
2000 VIAL (EA) INJECTION ONCE
Refills: 0 | Status: COMPLETED | OUTPATIENT
Start: 2019-07-25 | End: 2019-07-25

## 2019-07-25 RX ORDER — ATENOLOL 25 MG/1
25 TABLET ORAL DAILY
Refills: 0 | Status: DISCONTINUED | OUTPATIENT
Start: 2019-07-25 | End: 2019-07-26

## 2019-07-25 RX ORDER — VERAPAMIL HCL 240 MG
180 CAPSULE, EXTENDED RELEASE PELLETS 24 HR ORAL DAILY
Refills: 0 | Status: DISCONTINUED | OUTPATIENT
Start: 2019-07-25 | End: 2019-07-26

## 2019-07-25 RX ORDER — CEFAZOLIN SODIUM 1 G
1000 VIAL (EA) INJECTION EVERY 8 HOURS
Refills: 0 | Status: COMPLETED | OUTPATIENT
Start: 2019-07-25 | End: 2019-07-26

## 2019-07-25 RX ADMIN — LETROZOLE 2.5 MILLIGRAM(S): 2.5 TABLET, FILM COATED ORAL at 14:39

## 2019-07-25 RX ADMIN — Medication 50 MILLIGRAM(S): at 17:42

## 2019-07-25 RX ADMIN — Medication 1000 MILLIGRAM(S): at 17:42

## 2019-07-25 RX ADMIN — Medication 650 MILLIGRAM(S): at 17:50

## 2019-07-25 RX ADMIN — Medication 100 MILLIGRAM(S): at 09:11

## 2019-07-25 RX ADMIN — ATENOLOL 25 MILLIGRAM(S): 25 TABLET ORAL at 14:32

## 2019-07-25 RX ADMIN — Medication 650 MILLIGRAM(S): at 10:34

## 2019-07-25 NOTE — ASU PREOP CHECKLIST - NS PREOP CHK MONITOR ANESTHESIA CONSENT
Ethel,   Please notify patient new patient labs were stable with the exception of being positive for cocaine. Given patients extensive psychiatric history this can contribute to symptoms. Mental effects may include loss of contact with reality, an intense feeling of happiness, or agitation. Physical symptoms may include a fast heart rate, sweating, and large pupils. High doses can result in very high blood pressure or body temperature and MI. Please encourage patient not to miss appt with mental health provider for further medication management and counseling. done

## 2019-07-25 NOTE — PROGRESS NOTE ADULT - ASSESSMENT
78 year old female as above     #bradycardia q/ dizziness  - Holding blood pressure medications atenolol and verapamil  - s/p ppm placement  -  NPO After midnight  - Monitor vitals  - cardiology consult appreciated    #hypertension  - monitor blood pressure   - hydralazine    #hx of breast and bladder cancer  - outpatient follow up with heme/onch  - stable  - on letrozole    #HLD  - allergic to statins  - follow up outpatient with cardiology and PCP     #GERD  - pantoprazole  - stable    #DVT prophylaxis  - venodynes 78 year old female as above     #bradycardia q/ dizziness  - resolved   - s/p ppm placement  - Monitor vitals  - cardiology consult appreciated    #hypertension  - monitor blood pressure   - hydralazine  - atenolol  - verapamil    #hx of breast and bladder cancer  - outpatient follow up with heme/onch  - stable  - on letrozole    #HLD  - allergic to statins  - follow up outpatient with cardiology and PCP     #GERD  - pantoprazole  - stable    #DVT prophylaxis  - venodynes

## 2019-07-25 NOTE — PROGRESS NOTE ADULT - SUBJECTIVE AND OBJECTIVE BOX
REASON FOR VISIT: Bradycardia    HPI: 78 year old woman with a history of PAF, HTN, HLD, asthma, and malignancy (breast, bladder), admitted on 7/23/19 with symptomatic bradycardia; now s/p pacemaker insertion.    7/24/19: Cardiology to follow patient for symptomatic bradycardia. Pt feels well at rest. Monitor reveals sinus bradycardia to the 40s.   7/25/19:  Comfortable; resting s/p PPM implant.    MEDICATIONS  (STANDING):  ATENolol  Tablet 25 milliGRAM(s) Oral daily  ceFAZolin  Injectable. 1000 milliGRAM(s) IV Push every 8 hours  hydrALAZINE 50 milliGRAM(s) Oral two times a day  letrozole 2.5 milliGRAM(s) Oral daily  pantoprazole    Tablet 40 milliGRAM(s) Oral before breakfast  verapamil  milliGRAM(s) Oral daily    Vital Signs Last 24 Hrs  T(C): 36.1 (25 Jul 2019 13:07), Max: 36.7 (25 Jul 2019 07:59)  T(F): 96.9 (25 Jul 2019 13:07), Max: 98 (25 Jul 2019 07:59)  HR: 77 (25 Jul 2019 11:30) (40 - 77)  BP: 141/76 (25 Jul 2019 11:30) (88/60 - 155/63)  BP(mean): 89 (25 Jul 2019 11:30) (56 - 98)  RR: 16 (25 Jul 2019 11:30) (16 - 18)  SpO2: 99% (25 Jul 2019 11:30) (95% - 100%)    PHYSICAL EXAM:  Constitutional: NAD, awake and alert  Respiratory: Breath sounds are clear bilaterally, No wheezing, rales or rhonchi  Cardiovascular: Normal S1 and S2; bandage over PPM generator incision  Gastrointestinal: Bowel Sounds present, soft, nontender.   Extremities: No peripheral edema.    LABS:   CARDIAC MARKERS ( 23 Jul 2019 16:47 ) <0.015 ng/mL / x     / x     / x     / x                     11.0   4.43  )-----------( 157      ( 25 Jul 2019 06:56 )             33.5     139  |  108  |  37<H>  ----------------------------<  90  4.2   |  22  |  1.26    12 Lead ECG (07.23.19 @ 16:41): Marked sinus bradycardia with marked sinus arrhythmia, Early repolarization

## 2019-07-25 NOTE — PROCEDURE NOTE - NSICDXPROCEDURE_GEN_ALL_CORE_FT
PROCEDURES:  Implantation of intravenous dual chamber permanent cardiac pacemaker 25-Jul-2019 10:34:42  Miguel Gama

## 2019-07-25 NOTE — PACU DISCHARGE NOTE - COMMENTS
Pt. provided with post op instructions and aware that Monitor Tech is to pair device on unit. Pt. report provided to Luciana Hollins, by FRANCO Eng RN.  RN and Nursing assistant came to exchange monitor for pt. and verified monitor for transfer, then transferred pt. back to CICU.

## 2019-07-25 NOTE — PROGRESS NOTE ADULT - SUBJECTIVE AND OBJECTIVE BOX
Patient is a 78y old  Female who presents with a chief complaint of complain of dizziness (24 Jul 2019 11:39)      Patient seen and examined at bedside. No overnight events reported.     ALLERGIES:  codeine (Vomiting)  statins (Unknown)    MEDICATIONS  (STANDING):  ATENolol  Tablet 25 milliGRAM(s) Oral daily  ceFAZolin  Injectable. 1000 milliGRAM(s) IV Push every 8 hours  hydrALAZINE 50 milliGRAM(s) Oral two times a day  letrozole 2.5 milliGRAM(s) Oral daily  pantoprazole    Tablet 40 milliGRAM(s) Oral before breakfast  verapamil  milliGRAM(s) Oral daily    MEDICATIONS  (PRN):  acetaminophen   Tablet .. 650 milliGRAM(s) Oral every 6 hours PRN Temp greater or equal to 38C (100.4F), Mild Pain (1 - 3)    Vital Signs Last 24 Hrs  T(F): 97.2 (25 Jul 2019 10:27), Max: 98 (25 Jul 2019 07:59)  HR: 77 (25 Jul 2019 11:30) (40 - 77)  BP: 141/76 (25 Jul 2019 11:30) (88/60 - 155/63)  RR: 16 (25 Jul 2019 11:30) (16 - 18)  SpO2: 99% (25 Jul 2019 11:30) (95% - 100%)  I&O's Summary    PHYSICAL EXAM:  General: NAD, A/O x 3  ENT: MMM, no thrush  Neck: Supple, No JVD  Lungs: Clear to auscultation bilaterally, good air entry, non-labored breathing  Cardio: RRR, S1/S2, No murmur  Abdomen: Soft, Nontender, Nondistended; Bowel sounds present  Extremities: No calf tenderness, No pitting edema    LABS:                        11.0   4.43  )-----------( 157      ( 25 Jul 2019 06:56 )             33.5     07-25    139  |  108  |  37  ----------------------------<  90  4.2   |  22  |  1.26    Ca    10.0      25 Jul 2019 06:56  Mg     2.4     07-23    TPro  7.7  /  Alb  4.1  /  TBili  0.3  /  DBili  x   /  AST  22  /  ALT  25  /  AlkPhos  95  07-23        eGFR if Non African American: 41 mL/min/1.73M2 (07-25-19 @ 06:56)  eGFR if : 48 mL/min/1.73M2 (07-25-19 @ 06:56)    PT/INR - ( 23 Jul 2019 16:47 )   PT: 18.0 sec;   INR: 1.60 ratio         PTT - ( 23 Jul 2019 16:47 )  PTT:37.0 sec      CARDIAC MARKERS ( 23 Jul 2019 16:47 )  <0.015 ng/mL / x     / x     / x     / x        RADIOLOGY & ADDITIONAL TESTS:  < from: Xray Chest 1 View AP/PA. (07.23.19 @ 17:32) >  Impression: No active disease. No change.  < end of copied text >    Care Discussed with Consultants/Other Providers: Patient is a 78y old  Female who presents with a chief complaint of complain of dizziness (24 Jul 2019 11:39)      Patient seen and examined at bedside. No overnight events reported. s/p ppm placement doing well     ALLERGIES:  codeine (Vomiting)  statins (Unknown)    MEDICATIONS  (STANDING):  ATENolol  Tablet 25 milliGRAM(s) Oral daily  ceFAZolin  Injectable. 1000 milliGRAM(s) IV Push every 8 hours  hydrALAZINE 50 milliGRAM(s) Oral two times a day  letrozole 2.5 milliGRAM(s) Oral daily  pantoprazole    Tablet 40 milliGRAM(s) Oral before breakfast  verapamil  milliGRAM(s) Oral daily    MEDICATIONS  (PRN):  acetaminophen   Tablet .. 650 milliGRAM(s) Oral every 6 hours PRN Temp greater or equal to 38C (100.4F), Mild Pain (1 - 3)    Vital Signs Last 24 Hrs  T(F): 97.2 (25 Jul 2019 10:27), Max: 98 (25 Jul 2019 07:59)  HR: 77 (25 Jul 2019 11:30) (40 - 77)  BP: 141/76 (25 Jul 2019 11:30) (88/60 - 155/63)  RR: 16 (25 Jul 2019 11:30) (16 - 18)  SpO2: 99% (25 Jul 2019 11:30) (95% - 100%)  I&O's Summary    PHYSICAL EXAM:  General: NAD, A/O x 3  ENT: MMM, no thrush  Neck: Supple, No JVD  Lungs: Clear to auscultation bilaterally, good air entry, non-labored breathing  Cardio: +s1/s2 , No pitting edema  Abdomen: Soft, Nontender, Nondistended; Bowel sounds present  Extremities: No calf tenderness    LABS:                        11.0   4.43  )-----------( 157      ( 25 Jul 2019 06:56 )             33.5     07-25    139  |  108  |  37  ----------------------------<  90  4.2   |  22  |  1.26    Ca    10.0      25 Jul 2019 06:56  Mg     2.4     07-23    TPro  7.7  /  Alb  4.1  /  TBili  0.3  /  DBili  x   /  AST  22  /  ALT  25  /  AlkPhos  95  07-23        eGFR if Non African American: 41 mL/min/1.73M2 (07-25-19 @ 06:56)  eGFR if : 48 mL/min/1.73M2 (07-25-19 @ 06:56)    PT/INR - ( 23 Jul 2019 16:47 )   PT: 18.0 sec;   INR: 1.60 ratio         PTT - ( 23 Jul 2019 16:47 )  PTT:37.0 sec      CARDIAC MARKERS ( 23 Jul 2019 16:47 )  <0.015 ng/mL / x     / x     / x     / x        RADIOLOGY & ADDITIONAL TESTS:  < from: Xray Chest 1 View AP/PA. (07.23.19 @ 17:32) >  Impression: No active disease. No change.  < end of copied text >    Care Discussed with Consultants/Other Providers:

## 2019-07-26 ENCOUNTER — TRANSCRIPTION ENCOUNTER (OUTPATIENT)
Age: 79
End: 2019-07-26

## 2019-07-26 VITALS
HEART RATE: 60 BPM | OXYGEN SATURATION: 99 % | RESPIRATION RATE: 17 BRPM | SYSTOLIC BLOOD PRESSURE: 116 MMHG | DIASTOLIC BLOOD PRESSURE: 52 MMHG

## 2019-07-26 DIAGNOSIS — I49.5 SICK SINUS SYNDROME: ICD-10-CM

## 2019-07-26 LAB
ANION GAP SERPL CALC-SCNC: 8 MMOL/L — SIGNIFICANT CHANGE UP (ref 5–17)
BASOPHILS # BLD AUTO: 0.03 K/UL — SIGNIFICANT CHANGE UP (ref 0–0.2)
BASOPHILS NFR BLD AUTO: 0.5 % — SIGNIFICANT CHANGE UP (ref 0–2)
BUN SERPL-MCNC: 36 MG/DL — HIGH (ref 7–23)
CALCIUM SERPL-MCNC: 9.2 MG/DL — SIGNIFICANT CHANGE UP (ref 8.5–10.1)
CHLORIDE SERPL-SCNC: 112 MMOL/L — HIGH (ref 96–108)
CO2 SERPL-SCNC: 21 MMOL/L — LOW (ref 22–31)
CREAT SERPL-MCNC: 1.33 MG/DL — HIGH (ref 0.5–1.3)
EOSINOPHIL # BLD AUTO: 0.07 K/UL — SIGNIFICANT CHANGE UP (ref 0–0.5)
EOSINOPHIL NFR BLD AUTO: 1.1 % — SIGNIFICANT CHANGE UP (ref 0–6)
GLUCOSE SERPL-MCNC: 88 MG/DL — SIGNIFICANT CHANGE UP (ref 70–99)
HCT VFR BLD CALC: 32.5 % — LOW (ref 34.5–45)
HGB BLD-MCNC: 10.9 G/DL — LOW (ref 11.5–15.5)
IMM GRANULOCYTES NFR BLD AUTO: 0.3 % — SIGNIFICANT CHANGE UP (ref 0–1.5)
LYMPHOCYTES # BLD AUTO: 1.06 K/UL — SIGNIFICANT CHANGE UP (ref 1–3.3)
LYMPHOCYTES # BLD AUTO: 16.5 % — SIGNIFICANT CHANGE UP (ref 13–44)
MCHC RBC-ENTMCNC: 31 PG — SIGNIFICANT CHANGE UP (ref 27–34)
MCHC RBC-ENTMCNC: 33.5 GM/DL — SIGNIFICANT CHANGE UP (ref 32–36)
MCV RBC AUTO: 92.3 FL — SIGNIFICANT CHANGE UP (ref 80–100)
MONOCYTES # BLD AUTO: 0.72 K/UL — SIGNIFICANT CHANGE UP (ref 0–0.9)
MONOCYTES NFR BLD AUTO: 11.2 % — SIGNIFICANT CHANGE UP (ref 2–14)
NEUTROPHILS # BLD AUTO: 4.52 K/UL — SIGNIFICANT CHANGE UP (ref 1.8–7.4)
NEUTROPHILS NFR BLD AUTO: 70.4 % — SIGNIFICANT CHANGE UP (ref 43–77)
PLATELET # BLD AUTO: 156 K/UL — SIGNIFICANT CHANGE UP (ref 150–400)
POTASSIUM SERPL-MCNC: 3.9 MMOL/L — SIGNIFICANT CHANGE UP (ref 3.5–5.3)
POTASSIUM SERPL-SCNC: 3.9 MMOL/L — SIGNIFICANT CHANGE UP (ref 3.5–5.3)
RBC # BLD: 3.52 M/UL — LOW (ref 3.8–5.2)
RBC # FLD: 12.6 % — SIGNIFICANT CHANGE UP (ref 10.3–14.5)
SODIUM SERPL-SCNC: 141 MMOL/L — SIGNIFICANT CHANGE UP (ref 135–145)
WBC # BLD: 6.42 K/UL — SIGNIFICANT CHANGE UP (ref 3.8–10.5)
WBC # FLD AUTO: 6.42 K/UL — SIGNIFICANT CHANGE UP (ref 3.8–10.5)

## 2019-07-26 PROCEDURE — 99232 SBSQ HOSP IP/OBS MODERATE 35: CPT

## 2019-07-26 PROCEDURE — 93010 ELECTROCARDIOGRAM REPORT: CPT

## 2019-07-26 RX ORDER — LOSARTAN POTASSIUM 100 MG/1
1 TABLET, FILM COATED ORAL
Qty: 0 | Refills: 0 | DISCHARGE

## 2019-07-26 RX ORDER — LETROZOLE 2.5 MG/1
1 TABLET, FILM COATED ORAL
Qty: 0 | Refills: 0 | DISCHARGE

## 2019-07-26 RX ORDER — SPIRONOLACTONE 25 MG/1
1 TABLET, FILM COATED ORAL
Qty: 0 | Refills: 0 | DISCHARGE

## 2019-07-26 RX ADMIN — Medication 180 MILLIGRAM(S): at 05:12

## 2019-07-26 RX ADMIN — Medication 650 MILLIGRAM(S): at 06:10

## 2019-07-26 RX ADMIN — Medication 650 MILLIGRAM(S): at 05:11

## 2019-07-26 RX ADMIN — PANTOPRAZOLE SODIUM 40 MILLIGRAM(S): 20 TABLET, DELAYED RELEASE ORAL at 06:10

## 2019-07-26 RX ADMIN — Medication 1000 MILLIGRAM(S): at 00:34

## 2019-07-26 RX ADMIN — Medication 50 MILLIGRAM(S): at 05:11

## 2019-07-26 RX ADMIN — ATENOLOL 25 MILLIGRAM(S): 25 TABLET ORAL at 05:12

## 2019-07-26 NOTE — PROGRESS NOTE ADULT - PROBLEM SELECTOR PLAN 1
-Antibiotics as ordered  -continue b-blockers  -PPM booklet & temporary ID card given  -Post d/c & wound care instruction given to pt and all questions answered  -PPM to be interrogated today  -Discharge planning as per primary team  -Follow up appointment made on  8/9/19 @14:45 in EP clinic for wound check  - If pt develops chest pain/palpitations/shortness of breath/pain or swelling at procedure site, call EP clinic at [phone] -Antibiotics as ordered  -continue b-blockers  -PPM booklet & temporary ID card given  -Post d/c & wound care instruction given to pt and all questions answered  -PPM interrogated today: Normal functioning.  -Discharge planning as per primary team  -Follow up appointment made on  8/9/19 @14:45 in EP clinic for wound check  - If pt develops chest pain/palpitations/shortness of breath/pain or swelling at procedure site, call EP clinic at [phone]

## 2019-07-26 NOTE — DISCHARGE NOTE NURSING/CASE MANAGEMENT/SOCIAL WORK - NSDCDPATPORTLINK_GEN_ALL_CORE
You can access the [facility] Patient Portal, offered by [facility], by registering with the following website: [email or web address]

## 2019-07-26 NOTE — DISCHARGE NOTE PROVIDER - HOSPITAL COURSE
Patient admitted to hospital secondary to bradycardia secondary to sick sinus syndrome. now doing well s/p ppm placement via ep cardiology. will d/c home in stable condition.         #bradycardia q/ dizziness    - resolved     - s/p ppm placement    - Monitor vitals    - cardiology consult appreciated    - EP Cardiology consult appreciated - restart xarelto tonight        #hypertension    - monitor blood pressure     - hydralazine    - atenolol    - verapamil        #hx of breast and bladder cancer    - outpatient follow up with heme/onc    - stable    - patient stating today does not use letrozole anymore so will d/c (stopped 1 week prior)        #HLD    - allergic to statins    - follow up outpatient with cardiology and PCP         #GERD    - pantoprazole    - stable        #DVT prophylaxis    - venodynes

## 2019-07-26 NOTE — DISCHARGE NOTE PROVIDER - NSDCCPCAREPLAN_GEN_ALL_CORE_FT
PRINCIPAL DISCHARGE DIAGNOSIS  Diagnosis: Bradycardia  Assessment and Plan of Treatment: - s/p ppm placement   - follow up with ep cardiology and cardiology  - follow up with cardiology as not on statin at this time   - take medications as rx

## 2019-07-26 NOTE — PROGRESS NOTE ADULT - ASSESSMENT
78 year old female as above     #bradycardia q/ dizziness  - resolved   - s/p ppm placement  - Monitor vitals  - cardiology consult appreciated  - EP Cardiology consult appreciated - restart xarelto tonight    #hypertension  - monitor blood pressure   - hydralazine  - atenolol  - verapamil    #hx of breast and bladder cancer  - outpatient follow up with heme/onc  - stable  - patient stating today does not use letrozole anymore so will d/c (stopped 1 week prior)    #HLD  - allergic to statins  - follow up outpatient with cardiology and PCP     #GERD  - pantoprazole  - stable    #DVT prophylaxis  - venodynes 78 year old female as above     #bradycardia w/ dizziness  - secondary to sick sinus syndrome   - resolved   - s/p ppm placement  - Monitor vitals  - cardiology consult appreciated  - EP Cardiology consult appreciated - restart xarelto tonight    #hypertension  - monitor blood pressure   - hydralazine  - atenolol  - verapamil    #hx of breast and bladder cancer  - outpatient follow up with heme/onc  - stable  - patient stating today does not use letrozole anymore so will d/c (stopped 1 week prior)    #HLD  - allergic to statins  - follow up outpatient with cardiology and PCP     #GERD  - pantoprazole  - stable    #DVT prophylaxis  - venodynes 78 year old female as above     #bradycardia w/ dizziness  - secondary to sick sinus syndrome   - resolved   - s/p ppm placement  - Monitor vitals  - cardiology consult appreciated  - EP Cardiology consult appreciated - restart xarelto tonight    #CLARISA  - po hydration encouraged    #hypertension  - monitor blood pressure   - hydralazine  - atenolol  - verapamil    #hx of breast and bladder cancer  - outpatient follow up with heme/onc  - stable  - patient stating today does not use letrozole anymore so will d/c (stopped 1 week prior)    #HLD  - allergic to statins  - follow up outpatient with cardiology and PCP     #GERD  - pantoprazole  - stable    #DVT prophylaxis  - venodynes

## 2019-07-26 NOTE — DISCHARGE NOTE NURSING/CASE MANAGEMENT/SOCIAL WORK - NSDCPEPTSTRK_GEN_ALL_CORE
Stroke support groups for patients, families, and friends/Stroke education booklet/Signs and symptoms of stroke/Call 911 for stroke/Need for follow up after discharge/Prescribed medications/Risk factors for stroke/Stroke warning signs and symptoms

## 2019-07-26 NOTE — PROGRESS NOTE ADULT - SUBJECTIVE AND OBJECTIVE BOX
Patient is a 78y old  Female who presents with a chief complaint of complain of dizziness (26 Jul 2019 08:25)      Patient seen and examined at bedside. No overnight events reported. restart xarelto tonight per EP Cardiology; will d/c home in stable condition    ALLERGIES:  codeine (Vomiting)  statins (Unknown)    MEDICATIONS  (STANDING):  ATENolol  Tablet 25 milliGRAM(s) Oral daily  hydrALAZINE 50 milliGRAM(s) Oral two times a day  letrozole 2.5 milliGRAM(s) Oral daily  pantoprazole    Tablet 40 milliGRAM(s) Oral before breakfast  verapamil  milliGRAM(s) Oral daily    MEDICATIONS  (PRN):  acetaminophen   Tablet .. 650 milliGRAM(s) Oral every 6 hours PRN Temp greater or equal to 38C (100.4F), Mild Pain (1 - 3)    Vital Signs Last 24 Hrs  T(F): 97.9 (26 Jul 2019 05:38), Max: 97.9 (26 Jul 2019 05:38)  HR: 62 (26 Jul 2019 08:00) (60 - 66)  BP: 117/58 (26 Jul 2019 08:00) (107/56 - 136/63)  RR: 16 (26 Jul 2019 08:00) (12 - 23)  SpO2: 98% (26 Jul 2019 08:00) (94% - 99%)  I&O's Summary    25 Jul 2019 07:01  -  26 Jul 2019 07:00  --------------------------------------------------------  IN: 300 mL / OUT: 450 mL / NET: -150 mL    PHYSICAL EXAM:  General: NAD, A/O x 3  ENT: MMM, no thrush  Neck: Supple, No JVD  Lungs: Clear to auscultation bilaterally, good air entry, non-labored breathing  Cardio: +s1/s2 , No pitting edema  Abdomen: Soft, Nontender, Nondistended; Bowel sounds present  Extremities: No calf tenderness    LABS:                        10.9   6.42  )-----------( 156      ( 26 Jul 2019 06:38 )             32.5     07-26    141  |  112  |  36  ----------------------------<  88  3.9   |  21  |  1.33    Ca    9.2      26 Jul 2019 06:38  Mg     2.4     07-23    TPro  7.7  /  Alb  4.1  /  TBili  0.3  /  DBili  x   /  AST  22  /  ALT  25  /  AlkPhos  95  07-23    eGFR if Non African American: 39 mL/min/1.73M2 (07-26-19 @ 06:38)  eGFR if African American: 45 mL/min/1.73M2 (07-26-19 @ 06:38)    PT/INR - ( 23 Jul 2019 16:47 )   PT: 18.0 sec;   INR: 1.60 ratio    PTT - ( 23 Jul 2019 16:47 )  PTT:37.0 sec    CARDIAC MARKERS ( 23 Jul 2019 16:47 )  <0.015 ng/mL / x     / x     / x     / x          RADIOLOGY & ADDITIONAL TESTS:  < from: Xray Chest 1 View AP/PA. (07.23.19 @ 17:32) >  Impression: No active disease. No change.  < end of copied text >    < from: Xray Chest 1 View- PORTABLE-Urgent (07.25.19 @ 11:26) >  Impression:  No acute disease  No significant interval change as compared to  7/23/2019  < end of copied text > Patient is a 78y old  Female who presents with a chief complaint of complain of dizziness (26 Jul 2019 08:25)      Patient seen and examined at bedside. No overnight events reported. restart xarelto tonight per EP Cardiology; will d/c home in stable condition    ALLERGIES:  codeine (Vomiting)  statins (Unknown)    MEDICATIONS  (STANDING):  ATENolol  Tablet 25 milliGRAM(s) Oral daily  hydrALAZINE 50 milliGRAM(s) Oral two times a day  pantoprazole    Tablet 40 milliGRAM(s) Oral before breakfast  verapamil  milliGRAM(s) Oral daily    MEDICATIONS  (PRN):  acetaminophen   Tablet .. 650 milliGRAM(s) Oral every 6 hours PRN Temp greater or equal to 38C (100.4F), Mild Pain (1 - 3)      Vital Signs Last 24 Hrs  T(F): 97.9 (26 Jul 2019 05:38), Max: 97.9 (26 Jul 2019 05:38)  HR: 62 (26 Jul 2019 08:00) (60 - 66)  BP: 117/58 (26 Jul 2019 08:00) (107/56 - 136/63)  RR: 16 (26 Jul 2019 08:00) (12 - 23)  SpO2: 98% (26 Jul 2019 08:00) (94% - 99%)  I&O's Summary    25 Jul 2019 07:01  -  26 Jul 2019 07:00  --------------------------------------------------------  IN: 300 mL / OUT: 450 mL / NET: -150 mL    PHYSICAL EXAM:  General: NAD, A/O x 3  ENT: MMM, no thrush  Neck: Supple, No JVD  Lungs: Clear to auscultation bilaterally, good air entry, non-labored breathing  Cardio: +s1/s2 , No pitting edema  Abdomen: Soft, Nontender, Nondistended; Bowel sounds present  Extremities: No calf tenderness    LABS:                        10.9   6.42  )-----------( 156      ( 26 Jul 2019 06:38 )             32.5     07-26    141  |  112  |  36  ----------------------------<  88  3.9   |  21  |  1.33    Ca    9.2      26 Jul 2019 06:38  Mg     2.4     07-23    TPro  7.7  /  Alb  4.1  /  TBili  0.3  /  DBili  x   /  AST  22  /  ALT  25  /  AlkPhos  95  07-23    eGFR if Non African American: 39 mL/min/1.73M2 (07-26-19 @ 06:38)  eGFR if African American: 45 mL/min/1.73M2 (07-26-19 @ 06:38)    PT/INR - ( 23 Jul 2019 16:47 )   PT: 18.0 sec;   INR: 1.60 ratio    PTT - ( 23 Jul 2019 16:47 )  PTT:37.0 sec    CARDIAC MARKERS ( 23 Jul 2019 16:47 )  <0.015 ng/mL / x     / x     / x     / x          RADIOLOGY & ADDITIONAL TESTS:  < from: Xray Chest 1 View AP/PA. (07.23.19 @ 17:32) >  Impression: No active disease. No change.  < end of copied text >    < from: Xray Chest 1 View- PORTABLE-Urgent (07.25.19 @ 11:26) >  Impression:  No acute disease  No significant interval change as compared to  7/23/2019  < end of copied text >

## 2019-07-26 NOTE — PROGRESS NOTE ADULT - ASSESSMENT
79 yo Female with PMHx of breast CA, bladder CA, asthma, HTN, HLD, diverticulitis, arthritis is followed by Dr. Escobar for AT [facility] and has been on Xarelto.  She is complaining of worsening of SOB  and dizziness over the last few weeks. She has also been on AV tenisha blocking agents. She was sent over for severe Bradycardia with junctional escape beats. Her LVEF is 65% based on echo 1/2018.  s/p dual chamber PPM implant for SSS on 7/25/19. 40 yr old female with HTN, Type 2 DM, Asthma , Mild MARIELA -  no treatement - with Morbid Obesity- Coming in for Robotic Laparoscopic Vertical Sleeve Gastrectomy on 1/10/18.

## 2019-07-26 NOTE — PROGRESS NOTE ADULT - PROBLEM SELECTOR PLAN 3
Resume Xarelto when acceptable with EP service; continue atenolol, Verapamil.
Currently in sinus rhythm; can likely resume Xarelto in 24-48 hours.

## 2019-07-26 NOTE — PROGRESS NOTE ADULT - SUBJECTIVE AND OBJECTIVE BOX
INTERVAL HPI/OVERNIGHT EVENTS:  Pt is resting in the bed, NAD.      MEDICATIONS  (STANDING):  ATENolol  Tablet 25 milliGRAM(s) Oral daily  hydrALAZINE 50 milliGRAM(s) Oral two times a day  letrozole 2.5 milliGRAM(s) Oral daily  pantoprazole    Tablet 40 milliGRAM(s) Oral before breakfast  verapamil  milliGRAM(s) Oral daily    MEDICATIONS  (PRN):  acetaminophen   Tablet .. 650 milliGRAM(s) Oral every 6 hours PRN Temp greater or equal to 38C (100.4F), Mild Pain (1 - 3)      Allergies    codeine (Vomiting)  statins (Unknown)      ROS:  General: Pt denies recent weight loss/fever/chills    Neurological: denies numbness or  sensation loss    Cardiovascular: denies chest pain/palpitations/leg edema    Respiratory and Thorax: denies SOB/cough/wheezing    Gastrointestinal: denies abdominal pain/diarrhea/constipation/bloody stool    Genitourinary: denies urinary frequency/urgency/ dysuria    Musculoskeletal: mild pain at PPM site    Hematologic: denies abnormal bleeding  	    	  Physical Exam:  Vital Signs Last 24 Hrs  T(C): 36.6 (26 Jul 2019 05:38), Max: 36.6 (25 Jul 2019 08:27)  T(F): 97.9 (26 Jul 2019 05:38), Max: 97.9 (26 Jul 2019 05:38)  HR: 61 (26 Jul 2019 04:00) (53 - 77)  BP: 136/63 (26 Jul 2019 04:00) (107/56 - 155/63)  BP(mean): 78 (26 Jul 2019 04:00) (65 - 103)  RR: 15 (26 Jul 2019 04:00) (12 - 23)  SpO2: 98% (26 Jul 2019 04:00) (94% - 100%)    Constitutional: well developed, well nourished, no deformities and no acute distress    Neurological: Alert & Oriented x 3, GALLARDO, no focal deficits    HEENT: NC/AT, PERRLA, EOMI,  Neck supple.    Respiratory: CTA B/L, No wheezing/crackles/rhonchi    Cardiovascular: (+) S1 & S2, RRR, No m/r/g    Gastrointestinal: soft, NT, nondistended, (+) BS    Genitourinary: non distended bladder, voiding freely    Extremities: No pedal edema, No clubbing, No cyanosis    Skin:  Lt infraclavicular PPM site : C/D/I, no hematoma (+)florentino      LABS:                        10.9   6.42  )-----------( 156      ( 26 Jul 2019 06:38 )             32.5     07-26    141  |  112<H>  |  36<H>  ----------------------------<  88  3.9   |  21<L>  |  1.33<H>    Ca    9.2      26 Jul 2019 06:38      RADIOLOGY & ADDITIONAL TESTS:    TELE: SR 60-70's bpm, A-paced V-sensed    EKG: SR 60 bpm, A-paced V-sensed    CXR: PPM leads are in good position, no pneumothorax

## 2019-07-26 NOTE — DISCHARGE NOTE NURSING/CASE MANAGEMENT/SOCIAL WORK - NSDCPNDISPN_GEN_ALL_CORE
Activities of daily living, including home environment that might     exacerbate pain or reduce effectiveness of the pain management plan of care as well as strategies to address these issues/Opioids not applicable/not prescribed/Safe use, storage and disposal of opioids when prescribed/Education provided on the pain management plan of care/Side effects of pain management treatment

## 2019-07-26 NOTE — DISCHARGE NOTE PROVIDER - CARE PROVIDERS DIRECT ADDRESSES
[email or web address]

## 2019-07-26 NOTE — DISCHARGE NOTE PROVIDER - CARE PROVIDER_API CALL
Derick Escobar)  Cardiovascular Disease  [address] [place] [address]  Phone: [phone]  Fax: [phone]  Follow Up Time:     Miguel Gama)  Cardiac Electrophysiology; Cardiovascular Disease  [address] [place] [address]  Phone: [phone]  Fax: [phone]  Follow Up Time:

## 2019-07-26 NOTE — PROGRESS NOTE ADULT - SUBJECTIVE AND OBJECTIVE BOX
REASON FOR VISIT: Bradycardia    HPI: 78 year old woman with a history of PAF, HTN, HLD, asthma, and malignancy (breast, bladder), admitted on 7/23/19 with symptomatic bradycardia; now s/p pacemaker insertion.    7/24/19: Cardiology to follow patient for symptomatic bradycardia. Pt feels well at rest. Monitor reveals sinus bradycardia to the 40s.   7/25/19:  Comfortable; resting s/p PPM implant.  7/26/19:  Soreness at pacemaker generator site but otherwise feeling ok.    MEDICATIONS  (STANDING):  ATENolol  Tablet 25 milliGRAM(s) Oral daily  hydrALAZINE 50 milliGRAM(s) Oral two times a day  letrozole 2.5 milliGRAM(s) Oral daily  pantoprazole    Tablet 40 milliGRAM(s) Oral before breakfast  verapamil  milliGRAM(s) Oral daily    Vital Signs Last 24 Hrs  T(C): 36.6 (26 Jul 2019 05:38), Max: 36.6 (25 Jul 2019 08:23)  T(F): 97.9 (26 Jul 2019 05:38), Max: 97.9 (26 Jul 2019 05:38)  HR: 61 (26 Jul 2019 04:00) (41 - 77)  BP: 136/63 (26 Jul 2019 04:00) (107/56 - 155/63)  BP(mean): 78 (26 Jul 2019 04:00) (65 - 103)  RR: 15 (26 Jul 2019 04:00) (12 - 23)  SpO2: 98% (26 Jul 2019 04:00) (94% - 100%)    PHYSICAL EXAM:  Constitutional: NAD, awake and alert, seated in bedside chair  Respiratory: Breath sounds are clear bilaterally, No wheezing, rales or rhonchi  Cardiovascular: Normal S1 and S2; clear dressing over PPM generator incision   Extremities: No peripheral edema.  Psych: Appropriate mood and affect    LABS:                    10.9   6.42  )-----------( 156      ( 26 Jul 2019 06:38 )             32.5     139  |  108  |  37<H>  ----------------------------<  90  4.2   |  22  |  1.26    Ca    10.0      25 Jul 2019 06:56    12 Lead ECG (07.23.19 @ 16:41): Marked sinus bradycardia with marked sinus arrhythmia, Early repolarization    Tele:  Atrial demand pacing

## 2019-07-26 NOTE — PROGRESS NOTE ADULT - PROBLEM SELECTOR PLAN 2
Satisfactory control.
Satisfactory control.
-May resume xarelto tonight.    Plan discussed with Dr. Gama.

## 2019-07-26 NOTE — PROGRESS NOTE ADULT - REASON FOR ADMISSION
complain of dizziness

## 2019-07-29 ENCOUNTER — APPOINTMENT (OUTPATIENT)
Dept: RHEUMATOLOGY | Facility: CLINIC | Age: 79
End: 2019-07-29

## 2019-08-02 DIAGNOSIS — Z90.710 ACQUIRED ABSENCE OF BOTH CERVIX AND UTERUS: ICD-10-CM

## 2019-08-02 DIAGNOSIS — K21.9 GASTRO-ESOPHAGEAL REFLUX DISEASE WITHOUT ESOPHAGITIS: ICD-10-CM

## 2019-08-02 DIAGNOSIS — G89.29 OTHER CHRONIC PAIN: ICD-10-CM

## 2019-08-02 DIAGNOSIS — Z82.5 FAMILY HISTORY OF ASTHMA AND OTHER CHRONIC LOWER RESPIRATORY DISEASES: ICD-10-CM

## 2019-08-02 DIAGNOSIS — M25.569 PAIN IN UNSPECIFIED KNEE: ICD-10-CM

## 2019-08-02 DIAGNOSIS — I10 ESSENTIAL (PRIMARY) HYPERTENSION: ICD-10-CM

## 2019-08-02 DIAGNOSIS — Z79.51 LONG TERM (CURRENT) USE OF INHALED STEROIDS: ICD-10-CM

## 2019-08-02 DIAGNOSIS — Z90.12 ACQUIRED ABSENCE OF LEFT BREAST AND NIPPLE: ICD-10-CM

## 2019-08-02 DIAGNOSIS — I49.5 SICK SINUS SYNDROME: ICD-10-CM

## 2019-08-02 DIAGNOSIS — I27.29 OTHER SECONDARY PULMONARY HYPERTENSION: ICD-10-CM

## 2019-08-02 DIAGNOSIS — Z80.9 FAMILY HISTORY OF MALIGNANT NEOPLASM, UNSPECIFIED: ICD-10-CM

## 2019-08-02 DIAGNOSIS — Z85.3 PERSONAL HISTORY OF MALIGNANT NEOPLASM OF BREAST: ICD-10-CM

## 2019-08-02 DIAGNOSIS — M54.30 SCIATICA, UNSPECIFIED SIDE: ICD-10-CM

## 2019-08-02 DIAGNOSIS — I51.9 HEART DISEASE, UNSPECIFIED: ICD-10-CM

## 2019-08-02 DIAGNOSIS — M25.50 PAIN IN UNSPECIFIED JOINT: ICD-10-CM

## 2019-08-02 DIAGNOSIS — Z79.01 LONG TERM (CURRENT) USE OF ANTICOAGULANTS: ICD-10-CM

## 2019-08-02 DIAGNOSIS — I48.0 PAROXYSMAL ATRIAL FIBRILLATION: ICD-10-CM

## 2019-08-02 DIAGNOSIS — E55.9 VITAMIN D DEFICIENCY, UNSPECIFIED: ICD-10-CM

## 2019-08-02 DIAGNOSIS — Z82.49 FAMILY HISTORY OF ISCHEMIC HEART DISEASE AND OTHER DISEASES OF THE CIRCULATORY SYSTEM: ICD-10-CM

## 2019-08-02 DIAGNOSIS — J45.909 UNSPECIFIED ASTHMA, UNCOMPLICATED: ICD-10-CM

## 2019-08-02 DIAGNOSIS — M54.9 DORSALGIA, UNSPECIFIED: ICD-10-CM

## 2019-08-02 DIAGNOSIS — Z85.51 PERSONAL HISTORY OF MALIGNANT NEOPLASM OF BLADDER: ICD-10-CM

## 2019-08-02 DIAGNOSIS — M54.16 RADICULOPATHY, LUMBAR REGION: ICD-10-CM

## 2019-08-02 DIAGNOSIS — E78.2 MIXED HYPERLIPIDEMIA: ICD-10-CM

## 2019-08-02 DIAGNOSIS — R00.1 BRADYCARDIA, UNSPECIFIED: ICD-10-CM

## 2019-08-02 DIAGNOSIS — M19.90 UNSPECIFIED OSTEOARTHRITIS, UNSPECIFIED SITE: ICD-10-CM

## 2019-08-04 ENCOUNTER — INPATIENT (INPATIENT)
Facility: HOSPITAL | Age: 79
LOS: 0 days | Discharge: HOME | End: 2019-08-05
Attending: INTERNAL MEDICINE | Admitting: HOSPITALIST

## 2019-08-04 VITALS
WEIGHT: 167.77 LBS | SYSTOLIC BLOOD PRESSURE: 126 MMHG | DIASTOLIC BLOOD PRESSURE: 56 MMHG | RESPIRATION RATE: 16 BRPM | OXYGEN SATURATION: 99 % | HEART RATE: 65 BPM | TEMPERATURE: 99 F

## 2019-08-04 DIAGNOSIS — R53.82 CHRONIC FATIGUE, UNSPECIFIED: ICD-10-CM

## 2019-08-04 DIAGNOSIS — K21.9 GASTRO-ESOPHAGEAL REFLUX DISEASE WITHOUT ESOPHAGITIS: ICD-10-CM

## 2019-08-04 DIAGNOSIS — G89.29 OTHER CHRONIC PAIN: ICD-10-CM

## 2019-08-04 DIAGNOSIS — Z86.010 PERSONAL HISTORY OF COLONIC POLYPS: ICD-10-CM

## 2019-08-04 DIAGNOSIS — Z90.710 ACQUIRED ABSENCE OF BOTH CERVIX AND UTERUS: Chronic | ICD-10-CM

## 2019-08-04 DIAGNOSIS — Z79.01 LONG TERM (CURRENT) USE OF ANTICOAGULANTS: ICD-10-CM

## 2019-08-04 DIAGNOSIS — Z95.0 PRESENCE OF CARDIAC PACEMAKER: ICD-10-CM

## 2019-08-04 DIAGNOSIS — E78.5 HYPERLIPIDEMIA, UNSPECIFIED: ICD-10-CM

## 2019-08-04 DIAGNOSIS — I48.0 PAROXYSMAL ATRIAL FIBRILLATION: ICD-10-CM

## 2019-08-04 DIAGNOSIS — K25.9 GASTRIC ULCER, UNSPECIFIED AS ACUTE OR CHRONIC, WITHOUT HEMORRHAGE OR PERFORATION: ICD-10-CM

## 2019-08-04 DIAGNOSIS — Z90.711 ACQUIRED ABSENCE OF UTERUS WITH REMAINING CERVICAL STUMP: ICD-10-CM

## 2019-08-04 DIAGNOSIS — R10.13 EPIGASTRIC PAIN: ICD-10-CM

## 2019-08-04 DIAGNOSIS — R07.9 CHEST PAIN, UNSPECIFIED: ICD-10-CM

## 2019-08-04 DIAGNOSIS — Z85.3 PERSONAL HISTORY OF MALIGNANT NEOPLASM OF BREAST: ICD-10-CM

## 2019-08-04 DIAGNOSIS — J45.909 UNSPECIFIED ASTHMA, UNCOMPLICATED: ICD-10-CM

## 2019-08-04 DIAGNOSIS — Z85.51 PERSONAL HISTORY OF MALIGNANT NEOPLASM OF BLADDER: ICD-10-CM

## 2019-08-04 DIAGNOSIS — M19.90 UNSPECIFIED OSTEOARTHRITIS, UNSPECIFIED SITE: ICD-10-CM

## 2019-08-04 DIAGNOSIS — I10 ESSENTIAL (PRIMARY) HYPERTENSION: ICD-10-CM

## 2019-08-04 LAB — TROPONIN I SERPL-MCNC: <0.015 NG/ML — SIGNIFICANT CHANGE UP (ref 0.01–0.04)

## 2019-08-04 RX ORDER — PANTOPRAZOLE SODIUM 20 MG/1
40 TABLET, DELAYED RELEASE ORAL
Refills: 0 | Status: DISCONTINUED | OUTPATIENT
Start: 2019-08-04 | End: 2019-08-04

## 2019-08-04 RX ORDER — SUCRALFATE 1 G
1 TABLET ORAL ONCE
Refills: 0 | Status: COMPLETED | OUTPATIENT
Start: 2019-08-04 | End: 2019-08-04

## 2019-08-04 RX ORDER — ONDANSETRON 8 MG/1
4 TABLET, FILM COATED ORAL EVERY 12 HOURS
Refills: 0 | Status: DISCONTINUED | OUTPATIENT
Start: 2019-08-04 | End: 2019-08-04

## 2019-08-04 RX ORDER — SODIUM CHLORIDE 9 MG/ML
1000 INJECTION INTRAMUSCULAR; INTRAVENOUS; SUBCUTANEOUS
Refills: 0 | Status: DISCONTINUED | OUTPATIENT
Start: 2019-08-04 | End: 2019-08-04

## 2019-08-04 RX ORDER — HYDRALAZINE HCL 50 MG
50 TABLET ORAL
Refills: 0 | Status: DISCONTINUED | OUTPATIENT
Start: 2019-08-04 | End: 2019-08-04

## 2019-08-04 RX ORDER — ATENOLOL 25 MG/1
25 TABLET ORAL DAILY
Refills: 0 | Status: DISCONTINUED | OUTPATIENT
Start: 2019-08-04 | End: 2019-08-04

## 2019-08-04 RX ORDER — RIVAROXABAN 15 MG-20MG
20 KIT ORAL
Refills: 0 | Status: DISCONTINUED | OUTPATIENT
Start: 2019-08-04 | End: 2019-08-04

## 2019-08-04 RX ORDER — ALBUTEROL 90 UG/1
2.5 AEROSOL, METERED ORAL EVERY 6 HOURS
Refills: 0 | Status: DISCONTINUED | OUTPATIENT
Start: 2019-08-04 | End: 2019-08-04

## 2019-08-04 RX ORDER — ONDANSETRON 8 MG/1
8 TABLET, FILM COATED ORAL ONCE
Refills: 0 | Status: COMPLETED | OUTPATIENT
Start: 2019-08-04 | End: 2019-08-04

## 2019-08-04 RX ORDER — VERAPAMIL HCL 240 MG
180 CAPSULE, EXTENDED RELEASE PELLETS 24 HR ORAL DAILY
Refills: 0 | Status: DISCONTINUED | OUTPATIENT
Start: 2019-08-04 | End: 2019-08-04

## 2019-08-04 RX ORDER — NITROGLYCERIN 6.5 MG
0.4 CAPSULE, EXTENDED RELEASE ORAL ONCE
Refills: 0 | Status: COMPLETED | OUTPATIENT
Start: 2019-08-04 | End: 2019-08-04

## 2019-08-04 RX ADMIN — Medication 0.4 MILLIGRAM(S): at 19:07

## 2019-08-04 RX ADMIN — Medication 1 GRAM(S): at 20:31

## 2019-08-04 RX ADMIN — RIVAROXABAN 20 MILLIGRAM(S): KIT at 18:16

## 2019-08-04 RX ADMIN — Medication 180 MILLIGRAM(S): at 18:16

## 2019-08-04 RX ADMIN — ONDANSETRON 8 MILLIGRAM(S): 8 TABLET, FILM COATED ORAL at 19:38

## 2019-08-04 RX ADMIN — ATENOLOL 25 MILLIGRAM(S): 25 TABLET ORAL at 18:16

## 2019-08-04 RX ADMIN — Medication 50 MILLIGRAM(S): at 18:16

## 2019-08-04 RX ADMIN — Medication 30 MILLILITER(S): at 21:28

## 2019-08-04 NOTE — CHART NOTE - NSCHARTNOTEFT_GEN_A_CORE
Called to bedside to assess patient secondary to chest pain. Pt with VSS. Troponin wnl so far, EKG repeated and unchanged. Chest pain described as 'a pit in her stomach' that radiates to back and is associated with diaphoresis and nausea. On exam patient is resting however appears uncomfortable and wretching into basin. Pt with h/o 'stomach ulcer on the verge' for which she follows with GI and gets EGD monitoring. Pt daughter denies any h/o gastric polyps, colonic polyps were found previously. No recent NSAID use. Already on protonix bid and MOM. Chest pain did not alleviate with sublingual NTG. Sulcrafate 1gm ordered, zofran IV ordered, GI consult for pt GI group ordered, pt made NPO except meds. Patient and daughter state understanding with plan and all questions answered. Plan also d/w family who is NP at request of patient and daughter.

## 2019-08-05 ENCOUNTER — TRANSCRIPTION ENCOUNTER (OUTPATIENT)
Age: 79
End: 2019-08-05

## 2019-08-05 VITALS — SYSTOLIC BLOOD PRESSURE: 112 MMHG | DIASTOLIC BLOOD PRESSURE: 74 MMHG

## 2019-08-05 LAB — LIDOCAIN IGE QN: 76 U/L — SIGNIFICANT CHANGE UP (ref 73–393)

## 2019-08-05 RX ORDER — ACETAMINOPHEN 500 MG
1000 TABLET ORAL ONCE
Refills: 0 | Status: COMPLETED | OUTPATIENT
Start: 2019-08-05 | End: 2019-08-05

## 2019-08-05 RX ORDER — OMEPRAZOLE 10 MG/1
1 CAPSULE, DELAYED RELEASE ORAL
Qty: 30 | Refills: 0
Start: 2019-08-05 | End: 2019-09-03

## 2019-08-05 RX ORDER — ACETAMINOPHEN 500 MG
650 TABLET ORAL EVERY 6 HOURS
Refills: 0 | Status: DISCONTINUED | OUTPATIENT
Start: 2019-08-05 | End: 2019-08-04

## 2019-08-05 RX ORDER — OMEPRAZOLE 10 MG/1
1 CAPSULE, DELAYED RELEASE ORAL
Qty: 0 | Refills: 0 | DISCHARGE

## 2019-08-05 RX ORDER — RIVAROXABAN 15 MG-20MG
1 KIT ORAL
Qty: 0 | Refills: 0 | DISCHARGE

## 2019-08-05 RX ADMIN — PANTOPRAZOLE SODIUM 40 MILLIGRAM(S): 20 TABLET, DELAYED RELEASE ORAL at 06:02

## 2019-08-05 RX ADMIN — Medication 400 MILLIGRAM(S): at 01:37

## 2019-08-05 RX ADMIN — Medication 1000 MILLIGRAM(S): at 02:50

## 2019-08-05 RX ADMIN — Medication 180 MILLIGRAM(S): at 13:06

## 2019-08-05 RX ADMIN — SODIUM CHLORIDE 50 MILLILITER(S): 9 INJECTION INTRAMUSCULAR; INTRAVENOUS; SUBCUTANEOUS at 11:45

## 2019-08-05 RX ADMIN — Medication 50 MILLIGRAM(S): at 06:02

## 2019-08-05 RX ADMIN — ATENOLOL 25 MILLIGRAM(S): 25 TABLET ORAL at 13:06

## 2019-08-05 NOTE — CONSULT NOTE ADULT - SUBJECTIVE AND OBJECTIVE BOX
HPI:      PAST MEDICAL & SURGICAL HISTORY:  Chronic knee pain  Carcinoma of bladder  Chronic fatigue  Chronic back pain  Diverticulitis  GERD (gastroesophageal reflux disease)  HLD (hyperlipidemia)  Malignant neoplasm of breast  Sciatica  Low vitamin D level  Lumbar back pain with radiculopathy affecting right lower extremity  Arthritis  Afib  HTN (hypertension)  Asthma  H/O vaginal hysterectomy      MEDICATIONS  (STANDING):  ATENolol  Tablet 25 milliGRAM(s) Oral daily  hydrALAZINE 50 milliGRAM(s) Oral two times a day  pantoprazole    Tablet 40 milliGRAM(s) Oral two times a day  rivaroxaban 20 milliGRAM(s) Oral with dinner  sodium chloride 0.9%. 1000 milliLiter(s) (50 mL/Hr) IV Continuous <Continuous>  verapamil  milliGRAM(s) Oral daily    MEDICATIONS  (PRN):  acetaminophen   Tablet .. 650 milliGRAM(s) Oral every 6 hours PRN Moderate Pain (4 - 6)  ALBUTerol    0.083% 2.5 milliGRAM(s) Nebulizer every 6 hours PRN Shortness of Breath  aluminum hydroxide/magnesium hydroxide/simethicone Suspension 30 milliLiter(s) Oral every 6 hours PRN Dyspepsia  ondansetron   Disintegrating Tablet 4 milliGRAM(s) Oral every 12 hours PRN Nausea and/or Vomiting      Allergies    codeine (Vomiting)  statins (Unknown)    Intolerances        SOCIAL HISTORY:    FAMILY HISTORY:   Non-contributory    REVIEW OF SYSTEMS      General:	    Respiratory and Thorax:  	  Cardiovascular:	    Gastrointestinal:	    Musculoskeletal:	   Vital Signs Last 24 Hrs  T(C): 36.9 (05 Aug 2019 05:51), Max: 37.1 (04 Aug 2019 13:00)  T(F): 98.4 (05 Aug 2019 05:51), Max: 98.8 (04 Aug 2019 13:00)  HR: 61 (05 Aug 2019 05:51) (61 - 79)  BP: 109/75 (05 Aug 2019 05:51) (109/75 - 143/72)  BP(mean): 82 (04 Aug 2019 19:30) (82 - 82)  RR: 17 (05 Aug 2019 05:51) (16 - 17)  SpO2: 96% (05 Aug 2019 05:51) (96% - 99%)    HEENT :No Pallor.No icterus. EOMI,PERLAA  Chest : Clear to Auscultation  CVS : S1S2 Normal.No murmurs.  Abdomen: Soft mild epigastrictender .Normal bowel sounds.No Organomegaly.  CNS: Alert.Oriented to Time,Place and Person.No focal deficit.  EXT: Normal Range of motion.No pitting edema.    LABS:                        10.2   7.39  )-----------( 168      ( 04 Aug 2019 07:00 )             32.0     08-04    145  |  113<H>  |  26<H>  ----------------------------<  94  4.3   |  27  |  1.17    Ca    9.5      04 Aug 2019 07:00    TPro  7.1  /  Alb  3.9  /  TBili  0.2  /  DBili  x   /  AST  25  /  ALT  25  /  AlkPhos  82  08-04    PT/INR - ( 04 Aug 2019 07:00 )   PT: 17.7 sec;   INR: 1.57 ratio         PTT - ( 04 Aug 2019 07:00 )  PTT:38.3 sec  LIVER FUNCTIONS - ( 04 Aug 2019 07:00 )  Alb: 3.9 g/dL / Pro: 7.1 gm/dL / ALK PHOS: 82 U/L / ALT: 25 U/L / AST: 25 U/L / GGT: x             RADIOLOGY & ADDITIONAL STUDIES:

## 2019-08-05 NOTE — CONSULT NOTE ADULT - ASSESSMENT
Epigastric pain  Plan  Full liquid diet  EGD as outpatient tomorrow  OK to discharge from GI point of view

## 2019-08-05 NOTE — DISCHARGE NOTE NURSING/CASE MANAGEMENT/SOCIAL WORK - NSDCDPATPORTLINK_GEN_ALL_CORE
You can access the [facility] Portal, offered by [facility], by registering with the following website: [email or web address]

## 2019-08-05 NOTE — PROVIDER CONTACT NOTE (OTHER) - SITUATION
pt. continues to c/o mid epigastric pain that's radiating to abdomen and chest. mylanta, zofran, carafate previously given w/ no relief

## 2019-08-05 NOTE — DISCHARGE NOTE PROVIDER - CARE PROVIDER_API CALL
Zuleika Waller)  Gastroenterology  [address], [address]  [place] [address]  Phone: [phone]  Fax: [phone]  Follow Up Time:

## 2019-08-05 NOTE — DISCHARGE NOTE PROVIDER - HOSPITAL COURSE
78 year old female with H/O recent PPM placement, P AFib on xarelto, HTN, gastric ulcer admitted yesterday with epigastric  and chest pain. She ruled out ACS.     She was seen by Dr Waller and plan to have EGD tomorrow morning in his office. Patient feels much better today. No more pain.     She was seen by Dr Escobar and cleared for discharge. Discussed with daughter and patient regarding d/c plan.        Vital Signs Last 24 Hrs    T(C): 37.1 (05 Aug 2019 11:38), Max: 37.1 (04 Aug 2019 18:53)    T(F): 98.7 (05 Aug 2019 11:38), Max: 98.7 (04 Aug 2019 18:53)    HR: 72 (05 Aug 2019 11:38) (61 - 79)    BP: 112/74 (05 Aug 2019 13:07) (109/75 - 143/72)    BP(mean): 82 (04 Aug 2019 19:30) (82 - 82)    RR: 17 (05 Aug 2019 11:38) (16 - 17)    SpO2: 96% (05 Aug 2019 11:38) (96% - 96%)            PHYSICAL EXAM:     · CONSTITUTIONAL: Well appearing, well nourished, awake, alert, oriented to person, place, time/situation and in no apparent distress.    · ENMT: Airway patent, Nasal mucosa clear. Mouth with normal mucosa. Throat has no vesicles, no oropharyngeal exudates and uvula is midline.    · EYES: Clear bilaterally, pupils equal, round and reactive to light.    · CARDIAC: Bradycardic rate, regular rhythm.  Heart sounds S1, S2.  No murmurs, rubs or gallops.    · RESPIRATORY: Breath sounds clear and equal bilaterally.    · GASTROINTESTINAL: Abdomen soft, non-tender, no guarding.    · MUSCULOSKELETAL: Spine appears normal, range of motion is not limited, no muscle or joint tenderness    · NEUROLOGICAL: Alert and oriented, no focal deficits, no motor or sensory deficits.    · SKIN: Skin normal color for race, warm, dry and intact. No evidence of rash.            MEDICATIONS  (STANDING):    ATENolol  Tablet 25 milliGRAM(s) Oral daily    hydrALAZINE 50 milliGRAM(s) Oral two times a day    pantoprazole    Tablet 40 milliGRAM(s) Oral two times a day    rivaroxaban 20 milliGRAM(s) Oral with dinner    sodium chloride 0.9%. 1000 milliLiter(s) (50 mL/Hr) IV Continuous <Continuous>    verapamil  milliGRAM(s) Oral daily        MEDICATIONS  (PRN):    acetaminophen   Tablet .. 650 milliGRAM(s) Oral every 6 hours PRN Moderate Pain (4 - 6)    ALBUTerol    0.083% 2.5 milliGRAM(s) Nebulizer every 6 hours PRN Shortness of Breath    aluminum hydroxide/magnesium hydroxide/simethicone Suspension 30 milliLiter(s) Oral every 6 hours PRN Dyspepsia    ondansetron   Disintegrating Tablet 4 milliGRAM(s) Oral every 12 hours PRN Nausea and/or Vomiting                    Assessment and plan:             1. Epigastric pain    Ruled put ACS    GI and cardiology eval appreciated.    EGD tomorrow am with Dr Waller in his office    Continue PPI    Hold xarelto tonight for EGD in am        2. P A Fib-    Hold xarelto tonight for EGD in am    Continue verapamil        3. HTN-stable    Continue hydralazine and atenolol.            Home today    Spent more than 30 min to prepare the discharge.

## 2019-08-05 NOTE — DISCHARGE NOTE PROVIDER - NSDCFUSCHEDAPPT_GEN_ALL_CORE_FT
DRISS GAYTAN ; 08/09/2019 ; NPP CardioElectro [address]  DRISS GAYTAN ; 09/11/2019 ; NPP PulmMed [address] [facility]

## 2019-08-05 NOTE — DISCHARGE NOTE PROVIDER - NSDCCPCAREPLAN_GEN_ALL_CORE_FT
PRINCIPAL DISCHARGE DIAGNOSIS  Diagnosis: Chest pain  Assessment and Plan of Treatment: due to possibly due to gastric ulcer. Follow up with Dr ashton for EGD tomorrow

## 2019-08-09 ENCOUNTER — APPOINTMENT (OUTPATIENT)
Dept: ELECTROPHYSIOLOGY | Facility: CLINIC | Age: 79
End: 2019-08-09

## 2019-09-11 ENCOUNTER — APPOINTMENT (OUTPATIENT)
Dept: PULMONOLOGY | Facility: CLINIC | Age: 79
End: 2019-09-11

## 2019-09-26 ENCOUNTER — APPOINTMENT (OUTPATIENT)
Dept: PULMONOLOGY | Facility: CLINIC | Age: 79
End: 2019-09-26

## 2019-10-21 ENCOUNTER — APPOINTMENT (OUTPATIENT)
Dept: PULMONOLOGY | Facility: CLINIC | Age: 79
End: 2019-10-21
Payer: MEDICARE

## 2019-10-21 VITALS
OXYGEN SATURATION: 98 % | WEIGHT: 150 LBS | DIASTOLIC BLOOD PRESSURE: 80 MMHG | BODY MASS INDEX: 27.6 KG/M2 | SYSTOLIC BLOOD PRESSURE: 120 MMHG | HEART RATE: 67 BPM | RESPIRATION RATE: 16 BRPM | HEIGHT: 62 IN

## 2019-10-21 DIAGNOSIS — R10.13 EPIGASTRIC PAIN: ICD-10-CM

## 2019-10-21 PROCEDURE — 99214 OFFICE O/P EST MOD 30 MIN: CPT

## 2019-10-31 ENCOUNTER — RX RENEWAL (OUTPATIENT)
Age: 79
End: 2019-10-31

## 2019-10-31 PROBLEM — R10.13 EPIGASTRIC DISCOMFORT: Status: ACTIVE | Noted: 2019-10-28

## 2019-10-31 NOTE — HISTORY OF PRESENT ILLNESS
Patient presents for a reading of Mantoux Tuberculin Skin Test   [FreeTextEntry1] : Ms. Ponce is a 78 year old female presenting to the office for a follow up visit post hospitalization on 9/26/19 to 10/2/19 at [facility]. She has PMHx of  allergic rhinitis, asthma, DAVIS, GERD, pulmonary hypertension, gastric ulcers, and RLS. Pt is accompanied by her daughter clemente. \par \par Pt reports that she has been in and out of the hospital since July. She has hx of AFIB and SSS and had pacemaker placed in July. After discharge in early august she was re-admitted for chest pain and discomfort. They ruled out ACS or pacer issue at that time and recommended GI follow up/EGD outpatient. 2 weeks later, she was back in the hospital for SOB, chest discomfort and malaise. She was admitted and had a complicated admission with cardiac tamponade,  multi organ failure with liver failure, respiratory failure, acute cholecystitis, septic shock and pericarditis.\par \par 2 weeks ago, she went back to the hospital due to SOB and heaviness in her chest. \par She was found to have slight increase of fluid in lungs and another pericardial effusion. She was also in AFIB. She has a follow up with cardiologist next week.  She was told that she may have a viral infection vs PNA. PE was ruled out.\par \par At this point she has been very fatigued.\par She has been started on a number of new medications to help control her HR.\par SHe reports dealing with heaviness in her chest on and off. The shortness of breath she was feeling has resolved somewhat however she continues to feel winded and out of breath with exertion. She reports that the SOB resolves with rest within minutes. \par She reports somewhat of a decreased appetite. She did not go for GI follow up yet and refused her EGD in hospital due to everything that was going on but reports dealing with heartburn and epigastric tightness and fullness. She sometimes feels pain and burning. She is on a PPI daily. \par \par She has seen major improvement since prior to hospital admission however overall. \par She otherwise denies fever, chills, chest pain, cough, sinus issues, wheezing, n/v, rashes, sleeping issues.\par \par She is off [id number]. She is not using any inhalers at this time- she was using anoro in the past but did not feel that it helped her much. She is off singulair, nasal sprays.

## 2019-10-31 NOTE — ASSESSMENT
[FreeTextEntry1] : The plan for the patient is as follows:\par Her shortness of breath is multifactorial due to:\par -asthma\par -poor breathing mechanics\par -questionable pulmonary arterial hypertension\par -cardiac disease\par -pericardial fluid\par -BL effusions\par -overweight\par \par Problem 1: Fatigue- multifactorial d/t:\par -new medications \par -complicated hospital admission\par -deconditioning\par -cardiac disease\par \par problem 2: asthma\par -d/c anoro\par -add breo 200 1 puff once daily rinse and gargle\par -add xopenex rescue inhaler 2 puffs Q 6 hours PRN SOB\par -off singulair currently\par \par problem 3: GERD- currently flaring and with possible ulcers?\par -encouraged pt to discuss with GI and get clearance from cards\par -continue pantoprazole 40 mg PO Q am \par -add famotidine 40 mg PO QHS\par -add carafate 1 gm before meals as needed upto TID\par \par problem 4: allergy/ post nasal drip syndrome\par -stable, off nasal sprays\par \par problem 5: cardiac disease/ A-fib, pericarditis\par -continue to follow up with Dr. Rock \par \par problem 6: poor breathing mechanics\par -Proper breathing techniques were reviewed with an emphasis of exhalation. Patient instructed to breath in for 1 second and out for four seconds. Patient was encouraged to not talk while walking.\par \par F/U in 1 month with Dr. Mendoza with CXR, SPI\par She is encouraged to call with any changes, concerns, or questions.

## 2019-10-31 NOTE — PHYSICAL EXAM
[General Appearance - Well Developed] : well developed [Normal Appearance] : normal appearance [Well Groomed] : well groomed [General Appearance - Well Nourished] : well nourished [No Deformities] : no deformities [General Appearance - In No Acute Distress] : no acute distress [Normal Conjunctiva] : the conjunctiva exhibited no abnormalities [Eyelids - No Xanthelasma] : the eyelids demonstrated no xanthelasmas [Normal Oropharynx] : normal oropharynx [II] : II [Neck Appearance] : the appearance of the neck was normal [Neck Cervical Mass (___cm)] : no neck mass was observed [Jugular Venous Distention Increased] : there was no jugular-venous distention [Thyroid Diffuse Enlargement] : the thyroid was not enlarged [Thyroid Nodule] : there were no palpable thyroid nodules [Heart Rate And Rhythm] : heart rate and rhythm were normal [Heart Sounds] : normal S1 and S2 [Murmurs] : no murmurs present [Respiration, Rhythm And Depth] : normal respiratory rhythm and effort [Exaggerated Use Of Accessory Muscles For Inspiration] : no accessory muscle use [Auscultation Breath Sounds / Voice Sounds] : lungs were clear to auscultation bilaterally [FreeTextEntry1] : I:E 1:3, clear  [Abdomen Soft] : soft [Abdomen Tenderness] : non-tender [Abdomen Mass (___ Cm)] : no abdominal mass palpated [Abnormal Walk] : normal gait [Gait - Sufficient For Exercise Testing] : the gait was sufficient for exercise testing [Nail Clubbing] : no clubbing of the fingernails [Cyanosis, Localized] : no localized cyanosis [Petechial Hemorrhages (___cm)] : no petechial hemorrhages [Skin Color & Pigmentation] : normal skin color and pigmentation [] : no rash [No Venous Stasis] : no venous stasis [Skin Lesions] : no skin lesions [No Skin Ulcers] : no skin ulcer [No Xanthoma] : no  xanthoma was observed [Deep Tendon Reflexes (DTR)] : deep tendon reflexes were 2+ and symmetric [Sensation] : the sensory exam was normal to light touch and pinprick [No Focal Deficits] : no focal deficits [Oriented To Time, Place, And Person] : oriented to person, place, and time [Impaired Insight] : insight and judgment were intact [Affect] : the affect was normal

## 2019-11-04 ENCOUNTER — RX RENEWAL (OUTPATIENT)
Age: 79
End: 2019-11-04

## 2019-11-21 ENCOUNTER — APPOINTMENT (OUTPATIENT)
Dept: PULMONOLOGY | Facility: CLINIC | Age: 79
End: 2019-11-21
Payer: MEDICARE

## 2019-11-21 ENCOUNTER — FORM ENCOUNTER (OUTPATIENT)
Age: 79
End: 2019-11-21

## 2019-11-21 VITALS
WEIGHT: 152 LBS | BODY MASS INDEX: 28.7 KG/M2 | SYSTOLIC BLOOD PRESSURE: 149 MMHG | OXYGEN SATURATION: 100 % | HEART RATE: 60 BPM | HEIGHT: 61 IN | DIASTOLIC BLOOD PRESSURE: 79 MMHG

## 2019-11-21 PROCEDURE — 94729 DIFFUSING CAPACITY: CPT

## 2019-11-21 PROCEDURE — 99214 OFFICE O/P EST MOD 30 MIN: CPT | Mod: 25

## 2019-11-21 PROCEDURE — 94618 PULMONARY STRESS TESTING: CPT

## 2019-11-21 PROCEDURE — 94799 UNLISTED PULMONARY SVC/PX: CPT

## 2019-11-21 PROCEDURE — 94010 BREATHING CAPACITY TEST: CPT

## 2019-11-21 NOTE — REVIEW OF SYSTEMS
[Dyspnea] : dyspnea [Chest Discomfort] : chest discomfort [As Noted in HPI] : as noted in HPI [Negative] : Sleep Disorder [Wheezing] : no wheezing [Heartburn] : no heartburn [Reflux] : no reflux [Dysphagia] : no dysphagia [Constipation] : no constipation [Diarrhea] : no diarrhea

## 2019-11-21 NOTE — HISTORY OF PRESENT ILLNESS
[FreeTextEntry1] : Ms. Ponce is a 78 year old female presenting to the office for a follow up visit for allergic rhinitis, asthma, DAVIS, GERD, pulmonary hypertension history, and RLS. Her chief complaint is\par -she reports feeling generally well\par -she notes she becomes SOB when walking up stairs, but not down stairs\par -she reports feeling pain in her chest when walking up stairs\par -she was found to be slightly (borderline) anemic while at the hospital, and it was too mild to cause her symptoms. She saw Dr. Tabares at [place]\par -she had a pacemaker placed on her left side\par -in august she went to the ER, and misdiagnosed, and her heart filled with fluid, and they removed the fluid with a pericardiocentesis, and was on the ventilator for  5 days\par -in September, she had another incident of fluid filling in her heart, and attributed it to the lead of the blood thinner and pacemaker. She stopped the blood thinner, and has been feeling good. She notes she has been put back on the blood thinner\par -she has an ulcer from years ago\par -she notes her last Echo revealed her cardiac output was good, and there was no PAH\par -she notes she had another Echo done on October 31st with Slava (Patt)\par -She had a 3rd Echo in [place] in November\par -she notes her wheezing improved with use of Breo\par -she was told to remove Amiodarone by the hospital as they thought caused her SOB\par -she is not using the singulair\par -she denies any visual issues, chest pressure, diarrhea, constipation, dysphagia, dizziness, sour taste in the mouth, heartburn, reflux

## 2019-11-21 NOTE — PROCEDURE
[FreeTextEntry1] : PFT with SPI and DLCO revealed normal flows, with a FEV1 of 1.82L, which is 100% of predicted, and a normal diffusion of 16.8, which is 95% of predicted, with a normal flow volume loop \par \par 6 minute walk test reveals a low saturation of 99% with mild-moderate dyspnea; walked 98.7 meters before stopping because of dizziness and SOB.\par \par Chest X ray (9/2019) revealing left pleural effusion, with atelectasis, ?consolidation in the retrocardial area\par \par Echo (9.26.19) reveals 60% EF, there is mild increased LV thickness. Mild MR, mild TR, trivial pericardial effusion.\par PA systolic pressure was 28.5

## 2019-11-21 NOTE — ASSESSMENT
[FreeTextEntry1] : Ms. Ponce has a history of asthma, allergies, GERD, ?PAH, and A-fib. She is s/p left PPM complicated by hemopericardium - s/p discontinuation of Amiodarone.\par  \par Her shortness of breath is multifactorial due to:\par -asthma\par -poor breathing mechanics\par -questionable pulmonary arterial hypertension\par -cardiac disease\par -overweight\par \par problem 1: asthma\par -Add Trelegy 1 inhalation QD\par -continue Singulair 10 mg before bed \par -Add Xopenex (0.63) TID by nebulizer, PRN  (gargle and rinse after use)\par -Asthma is believed to be caused by inherited (genetic) and environmental factor, but its exact cause is unknown. Asthma may be triggered by allergens, lung infections, or irritants in the air. Asthma triggers are different for each person\par -Inhaler technique reviewed as well as oral hygiene techniques reviewed with patient. Avoidance of cold air, extremes of temperature, rescue inhaler should be used before exercise. Order of medication reviewed with patient. Recommended use of a cool mist humidifier in the bedroom.\par \par problem 2: allergy/ post nasal drip syndrome\par  - continue Flonase 1 sniff/nostriL BID\par -followed by Valarie (.15) 1 sniff/nostril BID\par -Environmental measures for allergies were encouraged including mattress and pillow cover, air purifier, and environmental controls.\par \par problem 3: cardiac disease/ A-fib (off Amiodarone)\par -continue to follow up with Dr. Wise\par \par Problem 3A: s/p Amiodarone / effusions \par -complete nc chest CT \par \par -The fluid between the lung and chest wall and it is of concern. A formal/definitive evaluation by a thoracic surgeon; a thoracentesis is only a temporizing situation- definitive therapy is needed. \par - Amiodarone/bleomycin/methotrexate and other drugs have been associated with pulmonary parenchymal damage. These drugs require pulmonary function testing including DLCO regularly and possible CT/CXR if respiratory complaints develop. PFTs should be performed at 6 month intervals.\par \par problem 4: poor breathing mechanics\par -Proper breathing techniques were reviewed with an emphasis of exhalation. Patient instructed to breath in for 1 second and out for four seconds. Patient was encouraged to not talk while walking.\par \par problem 5: GERD\par -continue to use Zantac 300 mg before bed \par -continue to use Carafate 1 g before each meal, PRN \par \par -Things to avoid including overeating, spicy foods, tight clothing, eating within three hours of bed, this list is not all inclusive. \par -For treatment of reflux, possible options discussed including diet control, H2 blockers, PPIs, as well as coating motility agents discussed as treatment options. Timing of meals and proximity of last meal to sleep were discussed. If symptoms persist, a formal gastrointestinal evaluation is needed.\par \par problem 6: low vitamin d\par -continue to use low vitamin d therapy\par -Has been associated with asthma exacerbations and increased allergic symptoms. The goal based on recent information is maintaining levels between 50-70 and low normal is 30. Recommended 50,000 units every two weeks to once a month depending on the level.\par \par problem 7: primary snoring/poor sleep (?RLS)\par -recommended to try Sleep Guard by [employer] \par -pending Requip 0.5 mg QHS \par -recommended to use Oxy-Aid by [employer] \par -sleep study was not consistent with sleep apnea \par \par problem 8: leg cramping / back pain\par -add Tizanidine 2 mg BID up to TiD\par -recommended to try Myocalm PM\par - Recommended Joint Guard\par - Recommended Pain Guard\par \par problem 9: health maintenance \par - s/p 2019 flu shot \par -strep pneumonia vaccines: Prevnar-13 vaccine, followed by Pneumo vaccine 23 one year following\par -early intervention for URIs\par -regular osteoporosis evaluations\par -early dermatological evaluations\par -after the age of 50 to the age of 72, colonoscopy every 5 years \par \par F/U in 4 months \par She is encouraged to call with any changes, concerns, or questions.

## 2019-11-21 NOTE — ADDENDUM
[FreeTextEntry1] : Documented by Darrion Velasquez acting as a scribe for Dr. Haroldo Mendoza on 11/21/2019.\par \par All medical record entries made by the Scribe were at my, Dr. Haroldo Mendoza's, direction and personally dictated by me on 11/21/2019. I have reviewed the chart and agree that the record accurately reflects my personal performance of the history, physical exam, assessment and plan. I have also personally directed, reviewed, and agree with the discharge instructions. \par

## 2019-11-22 ENCOUNTER — OUTPATIENT (OUTPATIENT)
Dept: OUTPATIENT SERVICES | Facility: HOSPITAL | Age: 79
LOS: 1 days | End: 2019-11-22
Payer: MEDICARE

## 2019-11-22 ENCOUNTER — MEDICATION RENEWAL (OUTPATIENT)
Age: 79
End: 2019-11-22

## 2019-11-22 ENCOUNTER — APPOINTMENT (OUTPATIENT)
Dept: CT IMAGING | Facility: CLINIC | Age: 79
End: 2019-11-22
Payer: MEDICARE

## 2019-11-22 DIAGNOSIS — Z90.710 ACQUIRED ABSENCE OF BOTH CERVIX AND UTERUS: Chronic | ICD-10-CM

## 2019-11-22 DIAGNOSIS — Z00.8 ENCOUNTER FOR OTHER GENERAL EXAMINATION: ICD-10-CM

## 2019-11-22 PROCEDURE — 71250 CT THORAX DX C-: CPT | Mod: 26

## 2019-11-22 PROCEDURE — 71250 CT THORAX DX C-: CPT

## 2020-01-06 ENCOUNTER — APPOINTMENT (OUTPATIENT)
Dept: PULMONOLOGY | Facility: CLINIC | Age: 80
End: 2020-01-06

## 2020-01-22 ENCOUNTER — APPOINTMENT (OUTPATIENT)
Dept: RHEUMATOLOGY | Facility: CLINIC | Age: 80
End: 2020-01-22
Payer: MEDICARE

## 2020-01-22 VITALS — DIASTOLIC BLOOD PRESSURE: 84 MMHG | RESPIRATION RATE: 14 BRPM | HEART RATE: 68 BPM | SYSTOLIC BLOOD PRESSURE: 142 MMHG

## 2020-01-22 PROCEDURE — 20610 DRAIN/INJ JOINT/BURSA W/O US: CPT | Mod: LT

## 2020-01-22 PROCEDURE — 99214 OFFICE O/P EST MOD 30 MIN: CPT | Mod: 25

## 2020-01-22 RX ORDER — METHYLPRED ACET/NACL,ISO-OS/PF 80 MG/ML
80 VIAL (ML) INJECTION
Qty: 1 | Refills: 0 | Status: COMPLETED | OUTPATIENT
Start: 2020-01-22

## 2020-01-22 RX ORDER — DICLOFENAC SODIUM 10 MG/G
1 GEL TOPICAL
Qty: 1 | Refills: 1 | Status: DISCONTINUED | COMMUNITY
Start: 2019-06-17 | End: 2020-01-22

## 2020-01-22 RX ADMIN — METHYLPREDNISOLONE ACETATE MG/ML: 80 INJECTION, SUSPENSION INTRA-ARTICULAR; INTRALESIONAL; INTRAMUSCULAR; SOFT TISSUE at 00:00

## 2020-01-22 NOTE — DATA REVIEWED
[FreeTextEntry1] : Available notes, and pertinent labs & imaging in EMR reviewed. Paper records reviewed, scanned to EMR. Pertinent labs and imaging summarized in HPI.

## 2020-01-22 NOTE — PHYSICAL EXAM
[General Appearance - In No Acute Distress] : in no acute distress [General Appearance - Alert] : alert [General Appearance - Well Nourished] : well nourished [PERRL With Normal Accommodation] : pupils were equal in size, round, and reactive to light [Sclera] : the sclera and conjunctiva were normal [Oropharynx] : the oropharynx was normal [Extraocular Movements] : extraocular movements were intact [Neck Appearance] : the appearance of the neck was normal [Thyroid Diffuse Enlargement] : the thyroid was not enlarged [Heart Rate And Rhythm] : heart rate was normal and rhythm regular [Auscultation Breath Sounds / Voice Sounds] : lungs were clear to auscultation bilaterally [Heart Sounds Gallop] : no gallops [Heart Sounds] : normal S1 and S2 [Murmurs] : no murmurs [Full Pulse] : the pedal pulses are present [Heart Sounds Pericardial Friction Rub] : no pericardial rub [Edema] : there was no peripheral edema [Abdomen Soft] : soft [Bowel Sounds] : normal bowel sounds [Abdomen Tenderness] : non-tender [No CVA Tenderness] : no ~M costovertebral angle tenderness [Abnormal Walk] : normal gait [Nail Clubbing] : no clubbing  or cyanosis of the fingernails [Motor Tone] : muscle strength and tone were normal [Musculoskeletal - Swelling] : no joint swelling seen [Skin Color & Pigmentation] : normal skin color and pigmentation [] : no rash [No Focal Deficits] : no focal deficits [Oriented To Time, Place, And Person] : oriented to person, place, and time [Impaired Insight] : insight and judgment were intact [Affect] : the affect was normal [Cervical Lymph Nodes Enlarged Anterior Bilaterally] : anterior cervical [Supraclavicular Lymph Nodes Enlarged Bilaterally] : supraclavicular [FreeTextEntry1] : + crepitus over b/l knees, TTP over midline L knee, no effusions. Mild OA changes in hands, fullness but no synovitis over L wrist. Remainder of joints normal. strength 5/5 in all extremities

## 2020-01-22 NOTE — ASSESSMENT
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 Milder pain in C spine. Worsening pain in L knee, s/p injection today. Minimal response to topical NSAIDs. Borderline FRANCISCO but remains with paucity of findings suggestive for CTD, inflammatory or crystalline arthritis and specific serologies negative. \par \par - s/p IA injection today\par - c/w tylenol prn pain\par - no further rheum w/u for CTD at this time, will surveill at visits if she needs further w/u\par - Prior DEXA normal, to be followed by PMD. c/w Ca/Vit D\par - RTC in 3-6 months to evaluate OA

## 2020-01-22 NOTE — CONSULT LETTER
[Dear  ___] : Dear  [unfilled], [Consult Letter:] : I had the pleasure of evaluating your patient, [unfilled]. [Consult Closing:] : Thank you very much for allowing me to participate in the care of this patient.  If you have any questions, please do not hesitate to contact me. [Please see my note below.] : Please see my note below. [Sincerely,] : Sincerely, [FreeTextEntry2] : Dr Cassidy Styles [FreeTextEntry3] : Cathy Aguayo MD\par Rheumatology\par [facility] \par \par [address] [place] [address]\par P: [phone]\par F: [phone]

## 2020-01-22 NOTE — HISTORY OF PRESENT ILLNESS
[FreeTextEntry1] : DRISS GAYTAN is a 78 year old woman with chronic diffuse joint pains x years 2/2 OA, worst in lumbar spine with R sided sciatica, neck/upper back, and knees. \par \par Imaging - XR C spine and knees with mild OA. L spine with advanced DJD with disc narrowing and osteophytes, with prominent one at Left L3-4 but without pain in this area. \par \par Since last visit no improvement with second round of PT but back pain is manageable and able to do all ADLs. Does not want to consider epidural injection right now. Diclofenac gel for knees has not helped. Today L knee is most painful and inquiring about injection. No other significant joint pain. \par \par Recent b/w with PMD with borderline FRANCISCO 1:80 but SLE serologies as well as extensive rheum w/u negative and routine labs stable. No F/C, recent infections, CP, SOB, abd pain, dysuria. SLE ROS negative for alopecia, sicca, salivary gland swelling, oral ulcers, malar rash, photosensitivity, SOB, chest pain, serositis, abd pain, dysuria, hematuria, rash, joint AM stiffness/synovitis, hematologic abnormalities, Raynauds. Inflammatory arthritis ROS negative for symmetrical peripheral joint synovitis, prolonged AM stiffness, rashes, eye inflammation, inflammatory low back pain, IBD. \par \par Pt remains very active, walks 2 miles daily. DEXA 2017 normal, no fractures, on Ca/VitD supplementation.

## 2020-01-22 NOTE — REASON FOR VISIT
[Follow-Up: _____] : a [unfilled] follow-up visit [FreeTextEntry1] : pain, arthritis in lower back and knees

## 2020-01-22 NOTE — PROCEDURE
[Today's Date:] : Date: [unfilled] [FreeTextEntry1] : Procedure: L knee IA injection \par \par Verbal consent obtained from DRISS GAYTAN after discussion of risks/ benefits / alternatives which include but are not limited to pain at injection site, infection, bleeding, skin hypopigmentation. \par \par Site identified, marked and sterilized with Betadine. Ethyl chloride applied for topical anesthetic.\par \par 25 g needle inserted via medial approach. No fluid present to be aspirated. \par 80 mg of depomedrol and 2ml of lidocaine injected. Ms. GAYTAN tolerated procedure well and there was minimal blood loss.\par \par Post-procedure instructions provided to Ms. GAYTAN including to avoid strenuous exercise for the next 48 hours and apply cold compresses to joint q6 hours for next 24 hours. Advised to notify the office and be evaluated at ED/ UC if worsening pain, swelling, warmth, or bleeding from site or if he develops a fever, chills. Pt verbalized understanding. \par \par Depomedrol Lot [id number], Exp 6/2020

## 2020-01-27 NOTE — PATIENT PROFILE ADULT - HOW PATIENT ADDRESSED, PROFILE
Cataract Surgery  Pre-Procedure Instructions        Patient Name:  Onesimo Walker    Surgical Procedure:  Cataract surgery right eye  Date:  04/01/2020  Location:  [facility]  Arrival Time:  The hospital will call you the day before surgery with the time.    Before your surgery:    ·  prescription(s) for ketorolac, prednisolone and ofloxacin drops at your pharmacy. You will receive a surgery bag from our office.    · On 03/31/20, you will start using ketorolac and ofloxacin drops in your right eye four times a day, breakfast, lunch, dinner and at bedtime. These drops will prepare your eye for surgery.     · You need to take a bath or shower and wash your hair either the night before or morning of surgery. Do not use any cosmetics, face creams, perfume or aftershave the morning of surgery. Do not wear any jewelry the day of surgery.    · Bring a complete list of your medications to the hospital/surgery center the day of your surgery.    · Bring all of your eye drops and the surgery bag along to the hosp/surgery center the day of your surgery.    · The hospital will call you the day before your surgery to let you know what time you will need to arrive for surgery. These calls are made later in the day. The admit times vary and will be between 5:00 am and noon.    · Do not eat or drink anything after midnight the night before or the morning of your surgery.     If you have any further questions, please call us at [phone].                After your surgery:    · Begin eye drops in your right eye as soon as you get home from surgery according to the schedule below. Do not stop any drops until Dr. Griffin tells you to.    Medication Breakfast Lunch Dinner Bedtime   ofloxacin X X X X   ketorolac X X X X   prednisolone X X X X     **Wait 2 - 3 minutes between each different drop.    Keep your scheduled post-operative appointments as scheduled below:  1st Post-op Appt -   2nd Post-op Appt  [facility] -  3rd Post op Appt -  04/02/20 8:30am Dr. Griffin  04/09/20 2:45pm Dr. Griffin  04/30/20 8:20am Dr. Long     Call [phone] if you have any problems, such as significant pain, increasing redness or sudden vision decline anytime following surgery. You can reach someone at this number 24 hours a day. If for some reason you are not able to, contact the nearest hospital.     Call Dr. Griffin's cell phone at [phone] if you have any problems after clinic hours.  If you don't reach him, call the number above.

## 2020-02-11 ENCOUNTER — OUTPATIENT (OUTPATIENT)
Dept: OUTPATIENT SERVICES | Facility: HOSPITAL | Age: 80
LOS: 1 days | End: 2020-02-11
Payer: MEDICARE

## 2020-02-11 ENCOUNTER — APPOINTMENT (OUTPATIENT)
Dept: RADIOLOGY | Facility: CLINIC | Age: 80
End: 2020-02-11
Payer: MEDICARE

## 2020-02-11 DIAGNOSIS — M85.80 OTHER SPECIFIED DISORDERS OF BONE DENSITY AND STRUCTURE, UNSPECIFIED SITE: ICD-10-CM

## 2020-02-11 DIAGNOSIS — Z90.710 ACQUIRED ABSENCE OF BOTH CERVIX AND UTERUS: Chronic | ICD-10-CM

## 2020-02-11 DIAGNOSIS — M25.562 PAIN IN LEFT KNEE: ICD-10-CM

## 2020-02-11 PROCEDURE — 73560 X-RAY EXAM OF KNEE 1 OR 2: CPT

## 2020-02-11 PROCEDURE — 73560 X-RAY EXAM OF KNEE 1 OR 2: CPT | Mod: 26,LT

## 2020-02-11 PROCEDURE — 77080 DXA BONE DENSITY AXIAL: CPT

## 2020-02-11 PROCEDURE — 77080 DXA BONE DENSITY AXIAL: CPT | Mod: 26

## 2020-02-21 ENCOUNTER — APPOINTMENT (OUTPATIENT)
Dept: PULMONOLOGY | Facility: CLINIC | Age: 80
End: 2020-02-21
Payer: MEDICARE

## 2020-02-21 ENCOUNTER — NON-APPOINTMENT (OUTPATIENT)
Age: 80
End: 2020-02-21

## 2020-02-21 VITALS
SYSTOLIC BLOOD PRESSURE: 140 MMHG | WEIGHT: 164 LBS | OXYGEN SATURATION: 98 % | BODY MASS INDEX: 30.96 KG/M2 | DIASTOLIC BLOOD PRESSURE: 82 MMHG | HEART RATE: 73 BPM | RESPIRATION RATE: 14 BRPM | HEIGHT: 61 IN

## 2020-02-21 PROCEDURE — 99214 OFFICE O/P EST MOD 30 MIN: CPT | Mod: 25

## 2020-02-21 PROCEDURE — 94010 BREATHING CAPACITY TEST: CPT

## 2020-02-21 PROCEDURE — 95012 NITRIC OXIDE EXP GAS DETER: CPT

## 2020-02-21 NOTE — REVIEW OF SYSTEMS
[Negative] : Endocrine [Chest Tightness] : chest tightness [Edema] : edema [Cough] : no cough [Wheezing] : no wheezing [Chest Discomfort] : no chest discomfort [Leg Cramps] : no leg cramps [GERD] : no gerd [Diarrhea] : no diarrhea [Constipation] : no constipation [Dizziness] : no dizziness [Dysphagia] : no dysphagia

## 2020-02-21 NOTE — ADDENDUM
[FreeTextEntry1] : Documented by Darrion Velasquez acting as a scribe for Dr. Haroldo Mendoza on 02/21/2020.\par \par All medical record entries made by the Scribe were at my, Dr. Haroldo Mendoza's, direction and personally dictated by me on 02/21/2020. I have reviewed the chart and agree that the record accurately reflects my personal performance of the history, physical exam, assessment and plan. I have also personally directed, reviewed, and agree with the discharge instructions.

## 2020-02-21 NOTE — PHYSICAL EXAM
[General Appearance - Well Developed] : well developed [Normal Appearance] : normal appearance [Well Groomed] : well groomed [General Appearance - Well Nourished] : well nourished [No Deformities] : no deformities [Normal Conjunctiva] : the conjunctiva exhibited no abnormalities [General Appearance - In No Acute Distress] : no acute distress [Eyelids - No Xanthelasma] : the eyelids demonstrated no xanthelasmas [Normal Oropharynx] : normal oropharynx [Neck Cervical Mass (___cm)] : no neck mass was observed [Neck Appearance] : the appearance of the neck was normal [Thyroid Nodule] : there were no palpable thyroid nodules [Thyroid Diffuse Enlargement] : the thyroid was not enlarged [Jugular Venous Distention Increased] : there was no jugular-venous distention [Heart Sounds] : normal S1 and S2 [Murmurs] : no murmurs present [Heart Rate And Rhythm] : heart rate and rhythm were normal [Respiration, Rhythm And Depth] : normal respiratory rhythm and effort [Exaggerated Use Of Accessory Muscles For Inspiration] : no accessory muscle use [Auscultation Breath Sounds / Voice Sounds] : lungs were clear to auscultation bilaterally [Abdomen Soft] : soft [Abdomen Tenderness] : non-tender [Abdomen Mass (___ Cm)] : no abdominal mass palpated [Abnormal Walk] : normal gait [Gait - Sufficient For Exercise Testing] : the gait was sufficient for exercise testing [Nail Clubbing] : no clubbing of the fingernails [Petechial Hemorrhages (___cm)] : no petechial hemorrhages [Cyanosis, Localized] : no localized cyanosis [Skin Color & Pigmentation] : normal skin color and pigmentation [] : no rash [No Venous Stasis] : no venous stasis [Skin Lesions] : no skin lesions [No Skin Ulcers] : no skin ulcer [No Xanthoma] : no  xanthoma was observed [Deep Tendon Reflexes (DTR)] : deep tendon reflexes were 2+ and symmetric [Sensation] : the sensory exam was normal to light touch and pinprick [No Focal Deficits] : no focal deficits [Impaired Insight] : insight and judgment were intact [Oriented To Time, Place, And Person] : oriented to person, place, and time [Affect] : the affect was normal [II] : II [FreeTextEntry1] : I:E 1:3, clear

## 2020-02-21 NOTE — ASSESSMENT
[FreeTextEntry1] : Ms. Ponce has a history of asthma, allergies, GERD, ?PAH, and A-fib. 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 Asthma may be triggered by allergens, lung infections, or irritants in the air. Asthma triggers are different for each person\par -Inhaler technique reviewed as well as oral hygiene techniques reviewed with patient. Avoidance of cold air, extremes of temperature, rescue inhaler should be used before exercise. Order of medication reviewed with patient. Recommended use of a cool mist humidifier in the bedroom.\par \par problem 2: allergy/ post nasal drip syndrome\par  - continue Flonase 1 sniff/nostriL BID\par -followed by Valarie (.15) 1 sniff/nostril BID\par -Environmental measures for allergies were encouraged including mattress and pillow cover, air purifier, and environmental controls.\par \par problem 3: cardiac disease/ A-fib (off Amiodarone)\par -continue to follow up with Dr. Wise\par \par Problem 3A: s/p Amiodarone / effusions - abnormal CT (yearly)\par -s/p nc chest CT - next 5/2020 HRCT prone and supine\par \par -The fluid between the lung and chest wall and it is of concern. A formal/definitive evaluation by a thoracic surgeon; a thoracentesis is only a temporizing situation- definitive therapy is needed. \par - Amiodarone/bleomycin/methotrexate and other drugs have been associated with pulmonary parenchymal damage. These drugs require pulmonary function testing including DLCO regularly and possible CT/CXR if respiratory complaints develop. PFTs should be performed at 6 month intervals.\par \par problem 4: poor breathing mechanics\par -Proper breathing techniques were reviewed with an emphasis of exhalation. Patient instructed to breath in for 1 second and out for four seconds. Patient was encouraged to not talk while walking.\par \par problem 5: GERD\par -Protonix 40 mg before breakfast\par -continue to use Carafate 1 g before each meal, PRN \par \par -Things to avoid including overeating, spicy foods, tight clothing, eating within three hours of bed, this list is not all inclusive. \par -For treatment of reflux, possible options discussed including diet control, H2 blockers, PPIs, as well as coating motility agents discussed as treatment options. Timing of meals and proximity of last meal to sleep were discussed. If symptoms persist, a formal gastrointestinal evaluation is needed.\par \par problem 6: low vitamin d\par -continue to use low vitamin d therapy\par -Has been associated with asthma exacerbations and increased allergic symptoms. The goal based on recent information is maintaining levels between 50-70 and low normal is 30. Recommended 50,000 units every two weeks to once a month depending on the level.\par \par problem 7: primary snoring/poor sleep (?RLS)\par -recommended to try Sleep Guard by Perque \par -continue Requip 0.5 mg QHS \par -recommended to use Oxy-Aid by [employer] \par -sleep study was not consistent with sleep apnea \par \par problem 8: leg cramping / back pain\par -continue Tizanidine 2 mg BID up to TiD\par -recommended to try Myocalm PM\par - Recommended Joint Guard\par - Recommended Pain Guard\par \par problem 9: health maintenance \par - s/p 2019 flu shot \par -strep pneumonia vaccines: Prevnar-13 vaccine, followed by Pneumo vaccine 23 one year following (completed)\par -early intervention for URIs\par -regular osteoporosis evaluations\par -early dermatological evaluations\par -after the age of 50 to the age of 70, colonoscopy every 5 years \par \par F/U in 4 months \par She is encouraged to call with any changes, concerns, or questions.

## 2020-02-21 NOTE — HISTORY OF PRESENT ILLNESS
[FreeTextEntry1] : Ms. Ponce is a 79 year old female presenting to the office for a follow up visit for allergic rhinitis, asthma, DAVIS, GERD, pulmonary hypertension history, and RLS. Her chief complaint is occasional chest pressure.\par -she reports has occasional chest pressure\par -she states she is coming closer to her baseline\par -she notes her weight is stable, and has returned to her normal weight\par -she reports her energy level is 7-8/10\par -she states her sleep pattern is not regular\par -she reports having slight leg swelling\par -she notes her appetite is good\par -She notes Her bowels are regular \par -she notes she coughs rarely\par -she denies taking any new medications, vitamins, or supplements. \par -she notes her Afib has been controlled\par -she has not gotten to exercise yet\par -her  states her snoring has improved\par -she is pending her next Echo in 5/2020\par -she denies any muscle cramps, wheezing, chest pain, diarrhea, constipation, dysphagia, dizziness, sour taste in the mouth, heartburn, reflux

## 2020-02-21 NOTE — PROCEDURE
[FreeTextEntry1] : PFT revealed normal flows, with a FEV1 of 1.80L, which is 102% of predicted, with a normal flow volume loop\par \par FENO was 7; a normal value being less than 25\par Fractional exhaled nitric oxide (FENO) is regarded as a simple, noninvasive method for assessing eosinophilic airway inflammation. Produced by a variety of cells within the lung, nitric oxide (NO) concentrations are generally low in healthy individuals. However, high concentrations of NO appear to be involved in nonspecific host defense mechanisms and chronic inflammatory diseases such as asthma. The American Thoracic Society (ATS) therefore has recommended using FENO to aid in the diagnosis and monitoring of eosinophilic airway inflammation and asthma, and for identifying steroid responsive individuals whose chronic respiratory symptoms may be caused by airway inflammation. \par \par Chest CT (11.22.19) reveals No pleural effusions are noted.

## 2020-02-23 ENCOUNTER — FORM ENCOUNTER (OUTPATIENT)
Age: 80
End: 2020-02-23

## 2020-02-24 ENCOUNTER — APPOINTMENT (OUTPATIENT)
Dept: CT IMAGING | Facility: CLINIC | Age: 80
End: 2020-02-24
Payer: MEDICARE

## 2020-02-24 ENCOUNTER — APPOINTMENT (OUTPATIENT)
Dept: RHEUMATOLOGY | Facility: CLINIC | Age: 80
End: 2020-02-24
Payer: MEDICARE

## 2020-02-24 ENCOUNTER — OUTPATIENT (OUTPATIENT)
Dept: OUTPATIENT SERVICES | Facility: HOSPITAL | Age: 80
LOS: 1 days | End: 2020-02-24
Payer: MEDICARE

## 2020-02-24 VITALS
DIASTOLIC BLOOD PRESSURE: 100 MMHG | HEART RATE: 83 BPM | BODY MASS INDEX: 30.99 KG/M2 | SYSTOLIC BLOOD PRESSURE: 170 MMHG | WEIGHT: 164 LBS | TEMPERATURE: 97.6 F | OXYGEN SATURATION: 99 %

## 2020-02-24 DIAGNOSIS — Z90.710 ACQUIRED ABSENCE OF BOTH CERVIX AND UTERUS: Chronic | ICD-10-CM

## 2020-02-24 DIAGNOSIS — R76.8 OTHER SPECIFIED ABNORMAL IMMUNOLOGICAL FINDINGS IN SERUM: ICD-10-CM

## 2020-02-24 DIAGNOSIS — M85.80 OTHER SPECIFIED DISORDERS OF BONE DENSITY AND STRUCTURE, UNSPECIFIED SITE: ICD-10-CM

## 2020-02-24 DIAGNOSIS — M15.9 POLYOSTEOARTHRITIS, UNSPECIFIED: ICD-10-CM

## 2020-02-24 DIAGNOSIS — M25.562 PAIN IN LEFT KNEE: ICD-10-CM

## 2020-02-24 DIAGNOSIS — G89.29 PAIN IN LEFT KNEE: ICD-10-CM

## 2020-02-24 PROCEDURE — 99214 OFFICE O/P EST MOD 30 MIN: CPT

## 2020-02-24 PROCEDURE — 73700 CT LOWER EXTREMITY W/O DYE: CPT

## 2020-02-24 PROCEDURE — 73700 CT LOWER EXTREMITY W/O DYE: CPT | Mod: 26,LT

## 2020-02-24 RX ORDER — DILTIAZEM HYDROCHLORIDE 240 MG/1
240 CAPSULE, EXTENDED RELEASE ORAL
Qty: 90 | Refills: 0 | Status: ACTIVE | COMMUNITY
Start: 2019-11-12

## 2020-02-24 RX ORDER — MONTELUKAST 10 MG/1
10 TABLET, FILM COATED ORAL
Qty: 1 | Refills: 1 | Status: DISCONTINUED | COMMUNITY
Start: 2019-11-21 | End: 2020-02-24

## 2020-02-24 RX ORDER — HYDRALAZINE HYDROCHLORIDE 50 MG/1
50 TABLET ORAL 3 TIMES DAILY
Qty: 270 | Refills: 3 | Status: DISCONTINUED | COMMUNITY
End: 2020-02-24

## 2020-02-24 RX ORDER — METOPROLOL SUCCINATE 100 MG/1
100 TABLET, EXTENDED RELEASE ORAL
Qty: 90 | Refills: 0 | Status: ACTIVE | COMMUNITY
Start: 2019-11-26

## 2020-02-24 RX ORDER — AMIODARONE HYDROCHLORIDE 200 MG/1
200 TABLET ORAL
Qty: 30 | Refills: 0 | Status: DISCONTINUED | COMMUNITY
Start: 2019-11-04

## 2020-02-24 RX ORDER — DILTIAZEM HYDROCHLORIDE 120 MG/1
120 CAPSULE, EXTENDED RELEASE ORAL
Qty: 30 | Refills: 0 | Status: DISCONTINUED | COMMUNITY
Start: 2019-10-02

## 2020-02-24 RX ORDER — RIVAROXABAN 20 MG/1
20 TABLET, FILM COATED ORAL
Qty: 90 | Refills: 3 | Status: DISCONTINUED | COMMUNITY
Start: 2018-10-31 | End: 2020-02-24

## 2020-02-24 RX ORDER — FLUTICASONE FUROATE AND VILANTEROL TRIFENATATE 200; 25 UG/1; UG/1
200-25 POWDER RESPIRATORY (INHALATION) DAILY
Qty: 1 | Refills: 4 | Status: DISCONTINUED | COMMUNITY
Start: 2019-11-04 | End: 2020-02-24

## 2020-02-24 RX ORDER — MONTELUKAST SODIUM 10 MG/1
10 TABLET, FILM COATED ORAL
Qty: 1 | Refills: 1 | Status: DISCONTINUED | COMMUNITY
Start: 2017-04-17 | End: 2020-02-24

## 2020-02-24 RX ORDER — POTASSIUM CHLORIDE 1500 MG/1
20 TABLET, FILM COATED, EXTENDED RELEASE ORAL
Qty: 30 | Refills: 0 | Status: COMPLETED | COMMUNITY
Start: 2019-08-15

## 2020-02-24 RX ORDER — SUCRALFATE 1 G/10ML
1 SUSPENSION ORAL
Qty: 1 | Refills: 0 | Status: DISCONTINUED | COMMUNITY
Start: 2019-10-28 | End: 2020-02-24

## 2020-02-24 RX ORDER — PANTOPRAZOLE 40 MG/1
40 TABLET, DELAYED RELEASE ORAL
Qty: 30 | Refills: 0 | Status: ACTIVE | COMMUNITY
Start: 2019-09-13

## 2020-02-24 RX ORDER — COLCHICINE 0.6 MG/1
0.6 TABLET ORAL
Qty: 15 | Refills: 0 | Status: DISCONTINUED | COMMUNITY
Start: 2019-08-13

## 2020-02-24 RX ORDER — FLUTICASONE FUROATE, UMECLIDINIUM BROMIDE AND VILANTEROL TRIFENATATE 100; 62.5; 25 UG/1; UG/1; UG/1
100-62.5-25 POWDER RESPIRATORY (INHALATION)
Qty: 3 | Refills: 1 | Status: DISCONTINUED | COMMUNITY
Start: 2019-11-21 | End: 2020-02-24

## 2020-02-24 RX ORDER — RANITIDINE 300 MG/1
300 TABLET ORAL
Qty: 90 | Refills: 3 | Status: DISCONTINUED | COMMUNITY
Start: 2019-05-10 | End: 2020-02-24

## 2020-02-24 RX ORDER — DILTIAZEM HYDROCHLORIDE 180 MG/1
180 CAPSULE, EXTENDED RELEASE ORAL
Qty: 60 | Refills: 0 | Status: COMPLETED | COMMUNITY
Start: 2019-08-15

## 2020-02-24 NOTE — CONSULT LETTER
[Dear  ___] : Dear  [unfilled], [Consult Letter:] : I had the pleasure of evaluating your patient, [unfilled]. [Please see my note below.] : Please see my note below. [Sincerely,] : Sincerely, [Consult Closing:] : Thank you very much for allowing me to participate in the care of this patient.  If you have any questions, please do not hesitate to contact me. [FreeTextEntry2] : Dr Cassidy Styles [FreeTextEntry3] : Cathy Aguayo MD\par Rheumatology\par [facility] \par \par [address] [place] [address]\par P: [phone]\par F: [phone]

## 2020-02-24 NOTE — HISTORY OF PRESENT ILLNESS
[FreeTextEntry1] : DRISS GAYTAN is a 78 year old woman with chronic diffuse joint pains x years 2/2 OA, worst in lumbar spine with R sided sciatica, neck/upper back, and knees. \par \par Since last visit acute onset L knee pain with medial aspect swelling after hearing a pop. Pain and swelling has partially improved, XR normal but still with difficulty ambulating. Injection from last visit without SE but did not help. No other joint pains. Prn tylenol for pain - voltaren gel and PT have not helped in the past. \par \par Imaging - XR C spine and knees with mild OA. L spine with advanced DJD with disc narrowing and osteophytes, with prominent one at Left L3-4 but without pain in this area. \par \par + borderline FRANCISCO 1:80, SLE serologies negative. SLE ROS remains negative. \par \par + osteopenia on recent DEXA -- very active, walks 2 miles daily. no fractures, on Ca/VitD supplementation.

## 2020-02-24 NOTE — PHYSICAL EXAM
[General Appearance - Alert] : alert [General Appearance - In No Acute Distress] : in no acute distress [General Appearance - Well Nourished] : well nourished [PERRL With Normal Accommodation] : pupils were equal in size, round, and reactive to light [Sclera] : the sclera and conjunctiva were normal [Extraocular Movements] : extraocular movements were intact [Oropharynx] : the oropharynx was normal [Neck Appearance] : the appearance of the neck was normal [Auscultation Breath Sounds / Voice Sounds] : lungs were clear to auscultation bilaterally [Heart Sounds] : normal S1 and S2 [Heart Rate And Rhythm] : heart rate was normal and rhythm regular [Murmurs] : no murmurs [Bowel Sounds] : normal bowel sounds [Edema] : there was no peripheral edema [Abdomen Soft] : soft [Abdomen Tenderness] : non-tender [Cervical Lymph Nodes Enlarged Anterior Bilaterally] : anterior cervical [Supraclavicular Lymph Nodes Enlarged Bilaterally] : supraclavicular [Abnormal Walk] : normal gait [No CVA Tenderness] : no ~M costovertebral angle tenderness [Musculoskeletal - Swelling] : no joint swelling seen [Nail Clubbing] : no clubbing  or cyanosis of the fingernails [Motor Tone] : muscle strength and tone were normal [Skin Color & Pigmentation] : normal skin color and pigmentation [] : no rash [No Focal Deficits] : no focal deficits [Oriented To Time, Place, And Person] : oriented to person, place, and time [Impaired Insight] : insight and judgment were intact [Affect] : the affect was normal [Motor Exam] : the motor exam was normal [FreeTextEntry1] : strength 5/5 x4

## 2020-02-24 NOTE — REVIEW OF SYSTEMS
[Arthralgias] : arthralgias [As Noted in HPI] : as noted in HPI [Joint Pain] : joint pain [Negative] : Heme/Lymph [Joint Swelling] : joint swelling

## 2020-02-24 NOTE — ASSESSMENT
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 Milder pain in C spine. Acute exacerbation of chronic L knee pain with some fullness and point tenderness concerning for internal derangement. Borderline FRANCISCO but paucity of findings suggestive for CTD. New osteopenia. \par \par - CT L knee as cannot have MRI 2/2 PPM -- will call with results \par - c/w tylenol prn pain\par - no further rheum w/u for CTD at this time, will surveil at visits if she needs further w/u\par - c/w Ca/Vit D\par - RTC in 3-6 months to evaluate OA

## 2020-02-24 NOTE — PROCEDURE
[Today's Date:] : Date: [unfilled] [FreeTextEntry1] : Procedure: L knee IA injection \par \par Verbal consent obtained from DRISS GAYTAN after discussion of risks/ benefits / alternatives which include but are not limited to pain at injection site, infection, bleeding, skin hypopigmentation. \par \par Site identified, marked and sterilized with Betadine. Ethyl chloride applied for topical anesthetic.\par \par 25 g needle inserted via medial approach. No fluid present to be aspirated. \par 80 mg of depomedrol and 2ml of lidocaine injected. Ms. GAYTAN tolerated procedure well and there was minimal blood loss.\par \par Post-procedure instructions provided to Ms. GAYTAN including to avoid strenuous exercise for the next 48 hours and apply cold compresses to joint q6 hours for next 24 hours. Advised to notify the office and be evaluated at ED/ UC if worsening pain, swelling, warmth, or bleeding from site or if he develops a fever, chills. Pt verbalized understanding. \par \par Depomedrol Lot [id number], Exp 6/2020

## 2020-05-14 ENCOUNTER — APPOINTMENT (OUTPATIENT)
Dept: NEUROLOGY | Facility: CLINIC | Age: 80
End: 2020-05-14

## 2020-06-18 ENCOUNTER — RESULT REVIEW (OUTPATIENT)
Age: 80
End: 2020-06-18

## 2020-06-18 ENCOUNTER — OUTPATIENT (OUTPATIENT)
Dept: OUTPATIENT SERVICES | Facility: HOSPITAL | Age: 80
LOS: 1 days | End: 2020-06-18
Payer: MEDICARE

## 2020-06-18 ENCOUNTER — APPOINTMENT (OUTPATIENT)
Dept: CT IMAGING | Facility: CLINIC | Age: 80
End: 2020-06-18
Payer: MEDICARE

## 2020-06-18 DIAGNOSIS — R06.02 SHORTNESS OF BREATH: ICD-10-CM

## 2020-06-18 DIAGNOSIS — Z90.710 ACQUIRED ABSENCE OF BOTH CERVIX AND UTERUS: Chronic | ICD-10-CM

## 2020-06-18 DIAGNOSIS — R93.89 ABNORMAL FINDINGS ON DIAGNOSTIC IMAGING OF OTHER SPECIFIED BODY STRUCTURES: ICD-10-CM

## 2020-06-18 PROCEDURE — 71250 CT THORAX DX C-: CPT

## 2020-06-18 PROCEDURE — 71250 CT THORAX DX C-: CPT | Mod: 26

## 2020-06-19 ENCOUNTER — APPOINTMENT (OUTPATIENT)
Dept: PULMONOLOGY | Facility: CLINIC | Age: 80
End: 2020-06-19
Payer: MEDICARE

## 2020-06-19 VITALS
HEIGHT: 60 IN | SYSTOLIC BLOOD PRESSURE: 150 MMHG | BODY MASS INDEX: 31.41 KG/M2 | DIASTOLIC BLOOD PRESSURE: 92 MMHG | HEART RATE: 78 BPM | OXYGEN SATURATION: 98 % | WEIGHT: 160 LBS | TEMPERATURE: 98 F | RESPIRATION RATE: 17 BRPM

## 2020-06-19 PROCEDURE — 99214 OFFICE O/P EST MOD 30 MIN: CPT | Mod: 25

## 2020-06-19 NOTE — ADDENDUM
[FreeTextEntry1] : Documented by Darrion Velasquez acting as a scribe for Dr. Haroldo Mendoza on 06/19/2020.\par \par All medical record entries made by the Scribe were at my, Dr. Haroldo Mendoza's, direction and personally dictated by me on 06/19/2020. I have reviewed the chart and agree that the record accurately reflects my personal performance of the history, physical exam, assessment and plan. I have also personally directed, reviewed, and agree with the discharge instructions.

## 2020-06-19 NOTE — HISTORY OF PRESENT ILLNESS
[FreeTextEntry1] : Ms. Ponce is a 79 year old female presenting to the office for a follow up visit for allergic rhinitis, asthma, DAVIS, GERD, pulmonary hypertension history, and RLS. Her chief complaint is occasional chest pain.\par -she reports feeling generally well\par -she notes she is sleeping well\par -she notes she has occasional chest pain\par -she reports her weight is stable\par -she notes she has some leg swelling at the end of the day\par -she notes she is sleeping well, with 6 hours nightly, and wakes up a few times per night\par -she reports her weight is stable\par -She notes Her bowels are regular \par -she reports she is walking for exercise, and her knees are feeling improved, though her legs feel weak\par -she reports her blood pressure medication was modified\par -she denies any chest pressure, diarrhea, constipation, dysphagia, dizziness, sour taste in the mouth, heartburn, reflux, myalgias or arthralgias.

## 2020-06-19 NOTE — ASSESSMENT
[FreeTextEntry1] : Ms. Ponce has a history of asthma, allergies, GERD, ?PAH, and A-fib. 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 Asthma may be triggered by allergens, lung infections, or irritants in the air. Asthma triggers are different for each person\par -Inhaler technique reviewed as well as oral hygiene techniques reviewed with patient. Avoidance of cold air, extremes of temperature, rescue inhaler should be used before exercise. Order of medication reviewed with patient. Recommended use of a cool mist humidifier in the bedroom.\par \par problem 2: allergy/ post nasal drip syndrome\par  - continue Flonase 1 sniff/nostriL BID\par -followed by Valarie (.15) 1 sniff/nostril BID\par -Environmental measures for allergies were encouraged including mattress and pillow cover, air purifier, and environmental controls.\par \par problem 3: cardiac disease/ A-fib (off Amiodarone)\par -continue to follow up with Dr. Wise\par \par Problem 3A: s/p Amiodarone / effusions - abnormal CT (yearly)\par -s/p nc chest CT - 6/2020 HRCT prone and supine - next 6/2021\par \par -The fluid between the lung and chest wall and it is of concern. A formal/definitive evaluation by a thoracic surgeon; a thoracentesis is only a temporizing situation- definitive therapy is needed. \par - Amiodarone/bleomycin/methotrexate and other drugs have been associated with pulmonary parenchymal damage. These drugs require pulmonary function testing including DLCO regularly and possible CT/CXR if respiratory complaints develop. PFTs should be performed at 6 month intervals.\par \par problem 4: poor breathing mechanics\par -Proper breathing techniques were reviewed with an emphasis of exhalation. Patient instructed to breath in for 1 second and out for four seconds. Patient was encouraged to not talk while walking.\par \par problem 5: GERD\par -Protonix 40 mg before breakfast\par -continue to use Carafate 1 g before each meal, PRN \par \par -Things to avoid including overeating, spicy foods, tight clothing, eating within three hours of bed, this list is not all inclusive. \par -For treatment of reflux, possible options discussed including diet control, H2 blockers, PPIs, as well as coating motility agents discussed as treatment options. Timing of meals and proximity of last meal to sleep were discussed. If symptoms persist, a formal gastrointestinal evaluation is needed.\par \par problem 6: low vitamin d\par -continue to use low vitamin d therapy\par -Has been associated with asthma exacerbations and increased allergic symptoms. The goal based on recent information is maintaining levels between 50-70 and low normal is 30. Recommended 50,000 units every two weeks to once a month depending on the level.\par \par problem 7: primary snoring/poor sleep (?RLS)\par -recommended to try Sleep Guard by [facility] \par -continue Requip 0.5 mg QHS \par -recommended to use Oxy-Aid by [employer] \par -sleep study was not consistent with sleep apnea \par \par problem 8: leg cramping / back pain\par -continue Tizanidine 2 mg BID up to TiD\par -recommended to try Myocalm PM\par - Recommended Joint Guard\par - Recommended Pain Guard\par \par Problem 9: Health Maintenance/COVID19 Precautions:\par - Clean your hands often. Wash your hands often with soap and water for at least 20 seconds, especially after blowing your nose, coughing, or sneezing, or having been in a public place.\par - If soap and water are not available, use a hand  that contains at least 60% alcohol.\par - To the extent possible, avoid touching high-touch surfaces in public places - elevator buttons, door handles, handrails, handshaking with people, etc. Use a tissue or your sleeve to cover your hand or finger if you must touch something.\par - Wash your hands after touching surfaces in public places.\par - Avoid touching your face, nose, eyes, etc.\par - Clean and disinfect your home to remove germs: practice routine cleaning of frequently touched surfaces (for example: tables, doorknobs, light switches, handles, desks, toilets, faucets, sinks & cell phones)\par - Avoid crowds, especially in poorly ventilated spaces. Your risk of exposure to respiratory viruses like COVID-19 may increase in crowded, closed-in settings with little air circulation if there are people in the crowd who are sick. All patients are recommended to practice social distancing and stay at least 6 feet away from others.\par - Avoid all non-essential travel including plane trips, and especially avoid embarking on cruise ships.\par -If COVID-19 is spreading in your community, take extra measures to put distance between yourself and other people to further reduce your risk of being exposed to this new virus.\par -Stay home as much as possible.\par - Consider ways of getting food brought to your house through family, social, or commercial networks\par -Be aware that the virus has been known to live in the air up to 3 hours post exposure, cardboard up to 24 hours post exposure, copper up to 4 hours post exposure, steel and plastic up to 2-3 days post exposure. Risk of transmission from these surfaces are affected by many variables.\par Immune Support Recommendations:\par -OTC Vitamin C 500mg BID \par -OTC Quercetin 250-500mg BID \par -OTC Zinc 75-100mg per day \par -OTC Melatonin 1 or 2 mg a night \par -OTC Vitamin D 1-4000mg per day \par -OTC Tonic Water 8oz per day\par Asthma and COVID19:\par You need to make sure your asthma is under control. This often requires the use of inhaled corticosteroids (and sometimes oral corticosteroids). Inhaled corticosteroids do not likely reduce your immune system’s ability to fight infections, but oral corticosteroids may. It is important to use the steps above to protect yourself to limit your exposure to any respiratory virus. \par \par problem 10: health maintenance \par - s/p 2019 flu shot \par -strep pneumonia vaccines: Prevnar-13 vaccine, followed by Pneumo vaccine 23 one year following (completed)\par -early intervention for URIs\par -regular osteoporosis evaluations\par -early dermatological evaluations\par -after the age of 50 to the age of 72, colonoscopy every 5 years \par \par F/U in 4 months \par She is encouraged to call with any changes, concerns, or questions.

## 2020-06-19 NOTE — PHYSICAL EXAM
[General Appearance - Well Developed] : well developed [Normal Appearance] : normal appearance [Well Groomed] : well groomed [General Appearance - Well Nourished] : well nourished [General Appearance - In No Acute Distress] : no acute distress [No Deformities] : no deformities [Normal Conjunctiva] : the conjunctiva exhibited no abnormalities [Eyelids - No Xanthelasma] : the eyelids demonstrated no xanthelasmas [Normal Oropharynx] : normal oropharynx [II] : II [Neck Cervical Mass (___cm)] : no neck mass was observed [Neck Appearance] : the appearance of the neck was normal [Thyroid Nodule] : there were no palpable thyroid nodules [Thyroid Diffuse Enlargement] : the thyroid was not enlarged [Jugular Venous Distention Increased] : there was no jugular-venous distention [Murmurs] : no murmurs present [Heart Rate And Rhythm] : heart rate and rhythm were normal [Heart Sounds] : normal S1 and S2 [Respiration, Rhythm And Depth] : normal respiratory rhythm and effort [Auscultation Breath Sounds / Voice Sounds] : lungs were clear to auscultation bilaterally [Abdomen Soft] : soft [Exaggerated Use Of Accessory Muscles For Inspiration] : no accessory muscle use [Abdomen Tenderness] : non-tender [Abdomen Mass (___ Cm)] : no abdominal mass palpated [Abnormal Walk] : normal gait [Gait - Sufficient For Exercise Testing] : the gait was sufficient for exercise testing [Nail Clubbing] : no clubbing of the fingernails [Petechial Hemorrhages (___cm)] : no petechial hemorrhages [Cyanosis, Localized] : no localized cyanosis [] : no rash [Skin Color & Pigmentation] : normal skin color and pigmentation [No Skin Ulcers] : no skin ulcer [No Venous Stasis] : no venous stasis [Skin Lesions] : no skin lesions [Deep Tendon Reflexes (DTR)] : deep tendon reflexes were 2+ and symmetric [Sensation] : the sensory exam was normal to light touch and pinprick [No Xanthoma] : no  xanthoma was observed [Impaired Insight] : insight and judgment were intact [Oriented To Time, Place, And Person] : oriented to person, place, and time [No Focal Deficits] : no focal deficits [Affect] : the affect was normal [FreeTextEntry1] : I:E 1:3, clear

## 2020-06-19 NOTE — PROCEDURE
[FreeTextEntry1] : Chest CT (6/18/2020) reveals no consolidation, edema, effusion or pneumothorax. Minimal areas of air trapping seen on expiratory phase which may be due to small airways inflammation such as asthma. Overall, no significant change since prior.

## 2020-06-19 NOTE — REVIEW OF SYSTEMS
[Negative] : Endocrine [Chest Discomfort] : chest discomfort [Edema] : edema [Myalgias] : myalgias [Chronic Pain] : chronic pain [Recent Wt Gain (___ Lbs)] : ~T no recent weight gain [Recent Wt Loss (___ Lbs)] : ~T no recent weight loss [GERD] : no gerd [Diarrhea] : no diarrhea [Constipation] : no constipation [Dysphagia] : no dysphagia [Dizziness] : no dizziness

## 2020-10-16 ENCOUNTER — APPOINTMENT (OUTPATIENT)
Dept: PULMONOLOGY | Facility: CLINIC | Age: 80
End: 2020-10-16
Payer: MEDICARE

## 2020-10-16 VITALS
BODY MASS INDEX: 31.34 KG/M2 | HEIGHT: 61 IN | TEMPERATURE: 97.2 F | OXYGEN SATURATION: 98 % | RESPIRATION RATE: 16 BRPM | WEIGHT: 166 LBS | SYSTOLIC BLOOD PRESSURE: 120 MMHG | DIASTOLIC BLOOD PRESSURE: 70 MMHG | HEART RATE: 77 BPM

## 2020-10-16 PROCEDURE — 99214 OFFICE O/P EST MOD 30 MIN: CPT

## 2020-10-16 NOTE — HISTORY OF PRESENT ILLNESS
[FreeTextEntry1] : Ms. Ponce is a 80 year old female presenting to the office for a follow up visit for allergic rhinitis, asthma, DAVIS, GERD, pulmonary hypertension history, and RLS. Her chief complaint is weight gain.\par -she reports feeling generally well, and feels tired at the end of the day\par -she notes she is walking for exercise\par -she reports having SOB and chest pressure occasionally when resting and walking, but more frequently when walking\par -she notes she is sleeping well, and is sometimes gets enough sleep. She wakes up 2-3 times per night\par -she reports having gained about 25 pounds during the pandemic\par -she states she exercises by walking 2 miles per day\par -she is s/p the flu shot\par -she notes her leg cramps have stopped but has sciatic pain\par -she denies any chest pain, chest pressure, diarrhea, constipation, dysphagia, dizziness, sour taste in the mouth, heartburn, reflux

## 2020-10-16 NOTE — PHYSICAL EXAM
[General Appearance - Well Developed] : well developed [Normal Appearance] : normal appearance [No Deformities] : no deformities [Well Groomed] : well groomed [General Appearance - Well Nourished] : well nourished [Normal Conjunctiva] : the conjunctiva exhibited no abnormalities [General Appearance - In No Acute Distress] : no acute distress [Normal Oropharynx] : normal oropharynx [Eyelids - No Xanthelasma] : the eyelids demonstrated no xanthelasmas [Neck Appearance] : the appearance of the neck was normal [Neck Cervical Mass (___cm)] : no neck mass was observed [Jugular Venous Distention Increased] : there was no jugular-venous distention [Thyroid Diffuse Enlargement] : the thyroid was not enlarged [Thyroid Nodule] : there were no palpable thyroid nodules [Heart Rate And Rhythm] : heart rate and rhythm were normal [Heart Sounds] : normal S1 and S2 [Murmurs] : no murmurs present [Auscultation Breath Sounds / Voice Sounds] : lungs were clear to auscultation bilaterally [Respiration, Rhythm And Depth] : normal respiratory rhythm and effort [Exaggerated Use Of Accessory Muscles For Inspiration] : no accessory muscle use [Abdomen Tenderness] : non-tender [Abdomen Soft] : soft [Abdomen Mass (___ Cm)] : no abdominal mass palpated [Abnormal Walk] : normal gait [Gait - Sufficient For Exercise Testing] : the gait was sufficient for exercise testing [Cyanosis, Localized] : no localized cyanosis [Nail Clubbing] : no clubbing of the fingernails [Petechial Hemorrhages (___cm)] : no petechial hemorrhages [No Venous Stasis] : no venous stasis [Skin Color & Pigmentation] : normal skin color and pigmentation [] : no rash [No Xanthoma] : no  xanthoma was observed [No Skin Ulcers] : no skin ulcer [Skin Lesions] : no skin lesions [Deep Tendon Reflexes (DTR)] : deep tendon reflexes were 2+ and symmetric [No Focal Deficits] : no focal deficits [Sensation] : the sensory exam was normal to light touch and pinprick [Impaired Insight] : insight and judgment were intact [Oriented To Time, Place, And Person] : oriented to person, place, and time [Affect] : the affect was normal [III] : III [FreeTextEntry1] : I:E 1:3, clear

## 2020-10-16 NOTE — ADDENDUM
[FreeTextEntry1] : Documented by Darrion Velasquez acting as a scribe for Dr. Haroldo Mendoza on 10/16/2020.\par \par All medical record entries made by the Scribe were at my, Dr. Haroldo Mendoza's, direction and personally dictated by me on 10/16/2020. I have reviewed the chart and agree that the record accurately reflects my personal performance of the history, physical exam, assessment and plan. I have also personally directed, reviewed, and agree with the discharge instructions.

## 2020-10-16 NOTE — REVIEW OF SYSTEMS
[Recent Wt Gain (___ Lbs)] : ~T recent [unfilled] lb weight gain [SOB on Exertion] : sob on exertion [Myalgias] : myalgias [Arthralgias] : arthralgias [Chronic Pain] : chronic pain [Negative] : Endocrine [Chest Discomfort] : no chest discomfort [GERD] : no gerd [Diarrhea] : no diarrhea [Constipation] : no constipation [Dysphagia] : no dysphagia

## 2020-10-16 NOTE — ASSESSMENT
[FreeTextEntry1] : Ms. Ponce is a 74 year old female with a history of asthma, allergies, GERD, ?PAH, and A-fib. 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 - s/p discontinuation of Amiodarone - now stable - near baseline - improvement continues - periodic sx.\par  \par Her shortness of breath is multifactorial due to:\par -asthma\par -poor breathing mechanics\par -questionable pulmonary arterial hypertension\par -cardiac disease\par -overweight\par \par problem 1: asthma (stable)\par -continue Trelegy 1 inhalation QD\par -continue Singulair 10 mg before bed \par -continue Xopenex (0.63) TID by nebulizer, PRN  (gargle and rinse after use)\par -Asthma is believed to be caused by inherited (genetic) and environmental factor, but its exact cause is unknown. Asthma may be triggered by allergens, lung infections, or irritants in the air. Asthma triggers are different for each person\par -Inhaler technique reviewed as well as oral hygiene techniques reviewed with patient. Avoidance of cold air, extremes of temperature, rescue inhaler should be used before exercise. Order of medication reviewed with patient. Recommended use of a cool mist humidifier in the bedroom.\par \par problem 2: allergy/ post nasal drip syndrome\par  - continue Flonase 1 sniff/nostriL BID\par -followed by Valarie (.15) 1 sniff/nostril BID\par -Environmental measures for allergies were encouraged including mattress and pillow cover, air purifier, and environmental controls.\par \par problem 3: cardiac disease/ A-fib (off Amiodarone)\par -continue to follow up with Dr. Wise\par \par Problem 3A: s/p Amiodarone / effusions - abnormal CT (yearly)\par -s/p nc chest CT - 6/2020 HRCT prone and supine - next 6/2021\par \par -The fluid between the lung and chest wall and it is of concern. A formal/definitive evaluation by a thoracic surgeon; a thoracentesis is only a temporizing situation- definitive therapy is needed. \par - Amiodarone/bleomycin/methotrexate and other drugs have been associated with pulmonary parenchymal damage. These drugs require pulmonary function testing including DLCO regularly and possible CT/CXR if respiratory complaints develop. PFTs should be performed at 6 month intervals.\par \par problem 4: poor breathing mechanics\par -Proper breathing techniques were reviewed with an emphasis of exhalation. Patient instructed to breath in for 1 second and out for four seconds. Patient was encouraged to not talk while walking.\par \par problem 5: GERD\par -Protonix 40 mg before breakfast\par -continue to use Carafate 1 g before each meal, PRN \par \par -Things to avoid including overeating, spicy foods, tight clothing, eating within three hours of bed, this list is not all inclusive. \par -For treatment of reflux, possible options discussed including diet control, H2 blockers, PPIs, as well as coating motility agents discussed as treatment options. Timing of meals and proximity of last meal to sleep were discussed. If symptoms persist, a formal gastrointestinal evaluation is needed.\par \par problem 6: low vitamin d\par -continue to use low vitamin d therapy\par -Has been associated with asthma exacerbations and increased allergic symptoms. The goal based on recent information is maintaining levels between 50-70 and low normal is 30. Recommended 50,000 units every two weeks to once a month depending on the level.\par \par problem 7: primary snoring/poor sleep (?RLS)\par -recommended to try Sleep Guard by [facility] \par -continue Requip 0.5 mg QHS \par -recommended to use Oxy-Aid by [facility] \par -sleep study was not consistent with sleep apnea \par \par problem 8: leg cramping / back pain\par -continue Tizanidine 2 mg BID up to TiD\par -recommended to try Myocalm PM\par - Recommended Joint Guard\par - Recommended Pain Guard\par \par Problem 9: Health Maintenance/COVID19 Precautions:\par - Clean your hands often. Wash your hands often with soap and water for at least 20 seconds, especially after blowing your nose, coughing, or sneezing, or having been in a public place.\par - If soap and water are not available, use a hand  that contains at least 60% alcohol.\par - To the extent possible, avoid touching high-touch surfaces in public places - elevator buttons, door handles, handrails, handshaking with people, etc. Use a tissue or your sleeve to cover your hand or finger if you must touch something.\par - Wash your hands after touching surfaces in public places.\par - Avoid touching your face, nose, eyes, etc.\par - Clean and disinfect your home to remove germs: practice routine cleaning of frequently touched surfaces (for example: tables, doorknobs, light switches, handles, desks, toilets, faucets, sinks & cell phones)\par - Avoid crowds, especially in poorly ventilated spaces. Your risk of exposure to respiratory viruses like COVID-19 may increase in crowded, closed-in settings with little air circulation if there are people in the crowd who are sick. All patients are recommended to practice social distancing and stay at least 6 feet away from others.\par - Avoid all non-essential travel including plane trips, and especially avoid embarking on cruise ships.\par -If COVID-19 is spreading in your community, take extra measures to put distance between yourself and other people to further reduce your risk of being exposed to this new virus.\par -Stay home as much as possible.\par - Consider ways of getting food brought to your house through family, social, or commercial networks\par -Be aware that the virus has been known to live in the air up to 3 hours post exposure, cardboard up to 24 hours post exposure, copper up to 4 hours post exposure, steel and plastic up to 2-3 days post exposure. Risk of transmission from these surfaces are affected by many variables.\par Immune Support Recommendations:\par -OTC Vitamin C 500mg BID \par -OTC Quercetin 250-500mg BID \par -OTC Zinc 75-100mg per day \par -OTC Melatonin 1 or 2 mg a night \par -OTC Vitamin D 1-4000mg per day \par -OTC Tonic Water 8oz per day\par Asthma and COVID19:\par You need to make sure your asthma is under control. This often requires the use of inhaled corticosteroids (and sometimes oral corticosteroids). Inhaled corticosteroids do not likely reduce your immune system’s ability to fight infections, but oral corticosteroids may. It is important to use the steps above to protect yourself to limit your exposure to any respiratory virus. \par \par problem 10: health maintenance \par - s/p 2020 flu shot \par -strep pneumonia vaccines: Prevnar-13 vaccine, followed by Pneumo vaccine 23 one year following (completed)\par -early intervention for URIs\par -regular osteoporosis evaluations\par -early dermatological evaluations\par -after the age of 50 to the age of 72, colonoscopy every 5 years \par \par F/U in 4 months \par She is encouraged to call with any changes, concerns, or questions.

## 2021-01-20 ENCOUNTER — APPOINTMENT (OUTPATIENT)
Dept: CT IMAGING | Facility: CLINIC | Age: 81
End: 2021-01-20
Payer: MEDICARE

## 2021-01-20 ENCOUNTER — OUTPATIENT (OUTPATIENT)
Dept: OUTPATIENT SERVICES | Facility: HOSPITAL | Age: 81
LOS: 1 days | End: 2021-01-20
Payer: MEDICARE

## 2021-01-20 DIAGNOSIS — Z00.8 ENCOUNTER FOR OTHER GENERAL EXAMINATION: ICD-10-CM

## 2021-01-20 DIAGNOSIS — Z90.710 ACQUIRED ABSENCE OF BOTH CERVIX AND UTERUS: Chronic | ICD-10-CM

## 2021-01-20 DIAGNOSIS — R19.7 DIARRHEA, UNSPECIFIED: ICD-10-CM

## 2021-01-20 PROCEDURE — 74177 CT ABD & PELVIS W/CONTRAST: CPT

## 2021-01-20 PROCEDURE — 74177 CT ABD & PELVIS W/CONTRAST: CPT | Mod: 26,MH

## 2021-01-21 DIAGNOSIS — Z01.812 ENCOUNTER FOR PREPROCEDURAL LABORATORY EXAMINATION: ICD-10-CM

## 2021-02-22 ENCOUNTER — APPOINTMENT (OUTPATIENT)
Dept: RHEUMATOLOGY | Facility: CLINIC | Age: 81
End: 2021-02-22

## 2021-02-24 ENCOUNTER — APPOINTMENT (OUTPATIENT)
Dept: PULMONOLOGY | Facility: CLINIC | Age: 81
End: 2021-02-24
Payer: MEDICARE

## 2021-02-24 VITALS
RESPIRATION RATE: 16 BRPM | DIASTOLIC BLOOD PRESSURE: 86 MMHG | OXYGEN SATURATION: 98 % | HEIGHT: 61 IN | SYSTOLIC BLOOD PRESSURE: 140 MMHG | HEART RATE: 85 BPM | WEIGHT: 164 LBS | BODY MASS INDEX: 30.96 KG/M2 | TEMPERATURE: 97.1 F

## 2021-02-24 PROCEDURE — ZZZZZ: CPT

## 2021-02-24 PROCEDURE — 99214 OFFICE O/P EST MOD 30 MIN: CPT | Mod: CS,25

## 2021-02-24 RX ORDER — DILTIAZEM HYDROCHLORIDE 360 MG/1
360 CAPSULE, COATED, EXTENDED RELEASE ORAL
Qty: 90 | Refills: 0 | Status: ACTIVE | COMMUNITY
Start: 2020-09-29

## 2021-02-24 RX ORDER — METOPROLOL SUCCINATE 50 MG/1
50 TABLET, EXTENDED RELEASE ORAL
Qty: 90 | Refills: 0 | Status: ACTIVE | COMMUNITY
Start: 2020-09-29

## 2021-02-24 RX ORDER — LOSARTAN POTASSIUM AND HYDROCHLOROTHIAZIDE 12.5; 5 MG/1; MG/1
50-12.5 TABLET ORAL
Qty: 90 | Refills: 0 | Status: ACTIVE | COMMUNITY
Start: 2020-11-12

## 2021-02-24 NOTE — PROCEDURE
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

## 2021-02-24 NOTE — HISTORY OF PRESENT ILLNESS
[FreeTextEntry1] : Ms. Ponce is a 80 year old female presenting to the office for a follow up visit for allergic rhinitis, asthma, DAVIS, GERD, pulmonary hypertension history, and RLS. Her chief complaint is \par \par -she notes generally feeling well\par -she notes intermittent SOB on stairs exacerbated by exertion\par -she notes sleeping 4 to 5 hours a night on average\par -she notes issues staying asleep\par -she notes sleep quality good\par -she notes regular exercise walking\par -she notes memory and concentration not as sharp as baseline\par -she notes supplementing with Vitamin D\par -she notes episode of diarrhea resolved\par -she notes s/p COVID 19 infection 1/2021 generally asymptomatic \par -she notes pancreatic nodule being observed\par -she notes regular bowel movements \par \par -denies any chest pain, chest pressure, diarrhea, constipation, dysphagia, sour taste in the mouth, dizziness, leg swelling, leg pain, myalgias, arthralgias, itchy eyes, itchy ears, heartburn, or reflux.\par \par

## 2021-02-24 NOTE — ADDENDUM
[FreeTextEntry1] : Documented by Sergio Downing acting as a scribe for Dr. Haroldo Mendoza on 02/24/2021.\par \par All medical record entries made by the Scribe were at my, Dr. Haroldo Mendoza's, direction and personally dictated by me on 02/24/2021 . I have reviewed the chart and agree that the record accurately reflects my personal performance of the history, physical exam, assessment and plan. I have also personally directed, reviewed, and agree with the discharge instructions. \par

## 2021-02-24 NOTE — ASSESSMENT
[FreeTextEntry1] : Ms. Ponce is a 80 year old female with a history of asthma, allergies, GERD, ?PAH, and A-fib. 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 - s/p discontinuation of Amiodarone - now stable - near baseline - improvement continues - recent COVID 19 1/2021 stable (resolved) \par  \par Her shortness of breath is multifactorial due to:\par -asthma\par -poor breathing mechanics\par -questionable pulmonary arterial hypertension\par -cardiac disease\par -overweight\par \par \par problem 1: asthma (stable)\par -continue Trelegy 100 1 inhalation QD\par -continue Singulair 10 mg before bed \par -continue Xopenex (0.63) TID by nebulizer, PRN  (gargle and rinse after use)\par -Asthma is believed to be caused by inherited (genetic) and environmental factor, but its exact cause is unknown. Asthma may be triggered by allergens, lung infections, or irritants in the air. Asthma triggers are different for each person\par -Inhaler technique reviewed as well as oral hygiene techniques reviewed with patient. Avoidance of cold air, extremes of temperature, rescue inhaler should be used before exercise. Order of medication reviewed with patient. Recommended use of a cool mist humidifier in the bedroom.\par \par Problem 1A: s/p COVID 19 infection (1/2021) \par -hold vaccine x 5 months\par -educated patient on COVID 19 vaccine and appropriate recommendations \par COVID-19 precautionary Immune Support Recommendations:\par -OTC Vitamin C 1000mg BID \par -OTC Quercetin 500mg BID \par -OTC Zinc 50 mg per day \par -OTC Melatonin 5 mg a night \par -OTC Vitamin D 2000mg per day \par -OTC Tonic Water 8oz per day\par -Water 0.5-1 gallon per day\par \par Additional Support Supplements: \par -Liposomal Glutathione 500 mg BID\par -SPM 1 tablet BID \par -Berberine 1000 mg BID  \par \par problem 2: allergy/ post nasal drip syndrome\par  - continue Flonase 1 sniff/nostriL BID\par -followed by Valarie (.15) 1 sniff/nostril BID\par -Environmental measures for allergies were encouraged including mattress and pillow cover, air purifier, and environmental controls.\par \par problem 3: cardiac disease/ A-fib (off Amiodarone)\par -continue to follow up with Dr. Wise\par \par Problem 3A: s/p Amiodarone / effusions - abnormal CT (yearly)\par -s/p nc chest CT - 6/2020 HRCT prone and supine - next 6/2021\par \par -The fluid between the lung and chest wall and it is of concern. A formal/definitive evaluation by a thoracic surgeon; a thoracentesis is only a temporizing situation- definitive therapy is needed. \par - Amiodarone/bleomycin/methotrexate and other drugs have been associated with pulmonary parenchymal damage. These drugs require pulmonary function testing including DLCO regularly and possible CT/CXR if respiratory complaints develop. PFTs should be performed at 6 month intervals.\par \par problem 4: poor breathing mechanics\par -Proper breathing techniques were reviewed with an emphasis of exhalation. Patient instructed to breath in for 1 second and out for four seconds. Patient was encouraged to not talk while walking.\par \par problem 5: GERD\par -Protonix 40 mg before breakfast\par -continue to use Carafate 1 g before each meal, PRN \par \par -Things to avoid including overeating, spicy foods, tight clothing, eating within three hours of bed, this list is not all inclusive. \par -For treatment of reflux, possible options discussed including diet control, H2 blockers, PPIs, as well as coating motility agents discussed as treatment options. Timing of meals and proximity of last meal to sleep were discussed. If symptoms persist, a formal gastrointestinal evaluation is needed.\par \par problem 6: low vitamin d\par -continue to use low vitamin d therapy\par -Has been associated with asthma exacerbations and increased allergic symptoms. The goal based on recent information is maintaining levels between 50-70 and low normal is 30. Recommended 50,000 units every two weeks to once a month depending on the level.\par \par problem 7: primary snoring/poor sleep (?RLS)\par -recommended to try Sleep Guard by [employer] \par -continue Requip 0.5 mg QHS \par -recommended to use Oxy-Aid by [employer] \par -sleep study was not consistent with sleep apnea \par \par problem 8: leg cramping / back pain\par -continue Tizanidine 2 mg BID up to TiD\par -recommended to try Myocalm PM\par - Recommended Joint Guard\par - Recommended Pain Guard\par \par Problem 9: Health Maintenance COVID 19 \par - Clean your hands often. Wash your hands often with soap and water for at least 20 seconds, especially after blowing your nose, coughing, or sneezing, or having been in a public place.\par - If soap and water are not available, use a hand  that contains at least 60% alcohol.\par - To the extent possible, avoid touching high-touch surfaces in public places - elevator buttons, door handles, handrails, handshaking with people, etc. Use a tissue or your sleeve to cover your hand or finger if you must touch something.\par - Wash your hands after touching surfaces in public places.\par - Avoid touching your face, nose, eyes, etc.\par - Clean and disinfect your home to remove germs: practice routine cleaning of frequently touched surfaces (for example: tables, doorknobs, light switches, handles, desks, toilets, faucets, sinks & cell phones)\par - Avoid crowds, especially in poorly ventilated spaces. Your risk of exposure to respiratory viruses like COVID-19 may increase in crowded, closed-in settings with little air circulation if there are people in the crowd who are sick. All patients are recommended to practice social distancing and stay at least 6 feet away from others.\par - Avoid all non-essential travel including plane trips, and especially avoid embarking on cruise ships.\par -If COVID-19 is spreading in your community, take extra measures to put distance between yourself and other people to further reduce your risk of being exposed to this new virus.\par -Stay home as much as possible.\par - Consider ways of getting food brought to your house through family, social, or commercial networks\par -Be aware that the virus has been known to live in the air up to 3 hours post exposure, cardboard up to 24 hours post exposure, copper up to 4 hours post exposure, steel and plastic up to 2-3 days post exposure. Risk of transmission from these surfaces are affected by many variables.\par Immune Support Recommendations:\par -OTC Vitamin C 500mg BID \par -OTC Quercetin 250-500mg BID \par -OTC Zinc 75-100mg per day \par -OTC Melatonin 1 or 2 mg a night \par -OTC Vitamin D 1-4000mg per day \par -OTC Tonic Water 8oz per day\par Asthma and COVID19:\par You need to make sure your asthma is under control. This often requires the use of inhaled corticosteroids (and sometimes oral corticosteroids). Inhaled corticosteroids do not likely reduce your immune system’s ability to fight infections, but oral corticosteroids may. It is important to use the steps above to protect yourself to limit your exposure to any respiratory virus. \par \par problem 10: health maintenance \par - s/p 2020 flu shot \par -strep pneumonia vaccines: Prevnar-13 vaccine, followed by Pneumo vaccine 23 one year following (completed)\par -early intervention for URIs\par -regular osteoporosis evaluations\par -early dermatological evaluations\par -after the age of 50 to the age of 72, colonoscopy every 5 years \par \par F/U in 4 months \par She is encouraged to call with any changes, concerns, or questions.

## 2021-03-17 ENCOUNTER — NON-APPOINTMENT (OUTPATIENT)
Age: 81
End: 2021-03-17

## 2021-04-08 ENCOUNTER — APPOINTMENT (OUTPATIENT)
Dept: RADIOLOGY | Facility: CLINIC | Age: 81
End: 2021-04-08
Payer: MEDICARE

## 2021-04-08 ENCOUNTER — OUTPATIENT (OUTPATIENT)
Dept: OUTPATIENT SERVICES | Facility: HOSPITAL | Age: 81
LOS: 1 days | End: 2021-04-08
Payer: MEDICARE

## 2021-04-08 DIAGNOSIS — Z00.8 ENCOUNTER FOR OTHER GENERAL EXAMINATION: ICD-10-CM

## 2021-04-08 DIAGNOSIS — Z90.710 ACQUIRED ABSENCE OF BOTH CERVIX AND UTERUS: Chronic | ICD-10-CM

## 2021-04-08 PROCEDURE — 72100 X-RAY EXAM L-S SPINE 2/3 VWS: CPT | Mod: 26

## 2021-04-08 PROCEDURE — 72220 X-RAY EXAM SACRUM TAILBONE: CPT | Mod: 26

## 2021-04-08 PROCEDURE — 72100 X-RAY EXAM L-S SPINE 2/3 VWS: CPT

## 2021-04-08 PROCEDURE — 72220 X-RAY EXAM SACRUM TAILBONE: CPT

## 2021-08-13 ENCOUNTER — APPOINTMENT (OUTPATIENT)
Dept: PULMONOLOGY | Facility: CLINIC | Age: 81
End: 2021-08-13
Payer: MEDICARE

## 2021-08-13 VITALS
OXYGEN SATURATION: 98 % | SYSTOLIC BLOOD PRESSURE: 110 MMHG | DIASTOLIC BLOOD PRESSURE: 80 MMHG | WEIGHT: 164 LBS | HEIGHT: 61 IN | HEART RATE: 79 BPM | BODY MASS INDEX: 30.96 KG/M2 | RESPIRATION RATE: 16 BRPM | TEMPERATURE: 97.7 F

## 2021-08-13 PROCEDURE — 99214 OFFICE O/P EST MOD 30 MIN: CPT | Mod: CS

## 2021-08-13 NOTE — ASSESSMENT
[FreeTextEntry1] : Ms. Ponce is a 80 year old female with a history of asthma, allergies, GERD, ?PAH, and A-fib. 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 - s/p discontinuation of Amiodarone - now stable - near baseline - improvement continues - COVID 19 1/2021 stable (resolved) -mild DAVIS \par  \par Her shortness of breath is multifactorial due to:\par -asthma\par -poor breathing mechanics\par -questionable pulmonary arterial hypertension\par -cardiac disease\par -overweight\par \par \par problem 1: asthma (stable)\par -continue Trelegy 100 1 inhalation QD\par -continue Singulair 10 mg before bed \par -continue Xopenex (0.63) TID by nebulizer, PRN  (gargle and rinse after use)\par -Asthma is believed to be caused by inherited (genetic) and environmental factor, but its exact cause is unknown. Asthma may be triggered by allergens, lung infections, or irritants in the air. Asthma triggers are different for each person\par -Inhaler technique reviewed as well as oral hygiene techniques reviewed with patient. Avoidance of cold air, extremes of temperature, rescue inhaler should be used before exercise. Order of medication reviewed with patient. Recommended use of a cool mist humidifier in the bedroom.\par \par Problem 1A: s/p COVID 19 infection (1/2021) \par -hold vaccine x 5 months\par -educated patient on COVID 19 vaccine and appropriate recommendations \par COVID-19 precautionary Immune Support Recommendations:\par -OTC Vitamin C 1000mg BID \par -OTC Quercetin 500mg BID \par -OTC Zinc 50 mg per day \par -OTC Melatonin 5 mg a night \par -OTC Vitamin D 2000mg per day \par -OTC Tonic Water 8oz per day\par -Water 0.5-1 gallon per day\par \par Additional Support Supplements: \par -Liposomal Glutathione 500 mg BID\par -SPM 1 tablet BID \par -Berberine 1000 mg BID  \par \par problem 2: allergy/ post nasal drip syndrome\par  - continue Flonase 1 sniff/nostriL BID\par -followed by Valarie (.15) 1 sniff/nostril BID\par -Environmental measures for allergies were encouraged including mattress and pillow cover, air purifier, and environmental controls.\par \par problem 3: cardiac disease/ A-fib (off Amiodarone)\par -continue to follow up with Dr. Wise\par -Echo pending\par \par Problem 3A: s/p Amiodarone / effusions - abnormal CT (yearly)\par -s/p nc chest CT - 6/2020 HRCT prone and supine - next 6/2021 (overdue)\par \par -The fluid between the lung and chest wall and it is of concern. A formal/definitive evaluation by a thoracic surgeon; a thoracentesis is only a temporizing situation- definitive therapy is needed. \par - Amiodarone/bleomycin/methotrexate and other drugs have been associated with pulmonary parenchymal damage. These drugs require pulmonary function testing including DLCO regularly and possible CT/CXR if respiratory complaints develop. PFTs should be performed at 6 month intervals.\par \par problem 4: poor breathing mechanics\par -Proper breathing techniques were reviewed with an emphasis of exhalation. Patient instructed to breath in for 1 second and out for four seconds. Patient was encouraged to not talk while walking.\par \par problem 5: GERD\par -Protonix 40 mg before breakfast\par -continue to use Carafate 1 g before each meal, PRN \par \par -Things to avoid including overeating, spicy foods, tight clothing, eating within three hours of bed, this list is not all inclusive. \par -For treatment of reflux, possible options discussed including diet control, H2 blockers, PPIs, as well as coating motility agents discussed as treatment options. Timing of meals and proximity of last meal to sleep were discussed. If symptoms persist, a formal gastrointestinal evaluation is needed.\par \par problem 6: low vitamin d\par -continue to use low vitamin d therapy\par -Has been associated with asthma exacerbations and increased allergic symptoms. The goal based on recent information is maintaining levels between 50-70 and low normal is 30. Recommended 50,000 units every two weeks to once a month depending on the level.\par \par problem 7: primary snoring/poor sleep (?RLS)\par -recommended to try Sleep Guard by [employer]tha \par -continue Requip 0.5 mg QHS \par -recommended to use Oxy-Aid by [employer] \par -sleep study was not consistent with sleep apnea \par \par problem 8: leg cramping / back pain- improved \par -continue Tizanidine 2 mg BID up to TiD\par -recommended to try Myocalm PM\par - Recommended Joint Guard\par - Recommended Pain Guard\par \par Problem 9: Health Maintenance COVID 19 \par -Covid 19 vaccine and booster discussed at length (VACCINE HESITANT) \par - Clean your hands often. Wash your hands often with soap and water for at least 20 seconds, especially after blowing your nose, coughing, or sneezing, or having been in a public place.\par - If soap and water are not available, use a hand  that contains at least 60% alcohol.\par - To the extent possible, avoid touching high-touch surfaces in public places - elevator buttons, door handles, handrails, handshaking with people, etc. Use a tissue or your sleeve to cover your hand or finger if you must touch something.\par - Wash your hands after touching surfaces in public places.\par - Avoid touching your face, nose, eyes, etc.\par - Clean and disinfect your home to remove germs: practice routine cleaning of frequently touched surfaces (for example: tables, doorknobs, light switches, handles, desks, toilets, faucets, sinks & cell phones)\par - Avoid crowds, especially in poorly ventilated spaces. Your risk of exposure to respiratory viruses like COVID-19 may increase in crowded, closed-in settings with little air circulation if there are people in the crowd who are sick. All patients are recommended to practice social distancing and stay at least 6 feet away from others.\par - Avoid all non-essential travel including plane trips, and especially avoid embarking on cruise ships.\par -If COVID-19 is spreading in your community, take extra measures to put distance between yourself and other people to further reduce your risk of being exposed to this new virus.\par -Stay home as much as possible.\par - Consider ways of getting food brought to your house through family, social, or commercial networks\par -Be aware that the virus has been known to live in the air up to 3 hours post exposure, cardboard up to 24 hours post exposure, copper up to 4 hours post exposure, steel and plastic up to 2-3 days post exposure. Risk of transmission from these surfaces are affected by many variables.\par Immune Support Recommendations:\par -OTC Vitamin C 500mg BID \par -OTC Quercetin 250-500mg BID \par -OTC Zinc 75-100mg per day \par -OTC Melatonin 1 or 2 mg a night \par -OTC Vitamin D 1-4000mg per day \par -OTC Tonic Water 8oz per day\par Asthma and COVID19:\par You need to make sure your asthma is under control. This often requires the use of inhaled corticosteroids (and sometimes oral corticosteroids). Inhaled corticosteroids do not likely reduce your immune system’s ability to fight infections, but oral corticosteroids may. It is important to use the steps above to protect yourself to limit your exposure to any respiratory virus. \par \par problem 10: health maintenance \par - s/p 2020 flu shot \par -strep pneumonia vaccines: Prevnar-13 vaccine, followed by Pneumo vaccine 23 one year following (completed)\par -early intervention for URIs\par -regular osteoporosis evaluations\par -early dermatological evaluations\par -after the age of 50 to the age of 72, colonoscopy every 5 years \par \par F/U in 4 months \par She is encouraged to call with any changes, concerns, or questions.

## 2021-08-13 NOTE — ADDENDUM
[FreeTextEntry1] : Documented by Tuan Novoa acting as a scribe for Dr. Haroldo Mendoza on 08/13/2021 \par \par All medical record entries made by the Scribe were at my, Dr. Haroldo Mendoza's, direction and personally dictated by me on 08/13/2021 . I have reviewed the chart and agree that the record accurately reflects my personal performance of the history, physical exam, assessment and plan. I have also personally directed, reviewed, and agree with the discharge instructions

## 2021-08-13 NOTE — HISTORY OF PRESENT ILLNESS
[FreeTextEntry1] : Ms. Ponce is a 80 year old female presenting to the office for a follow up visit for allergic rhinitis, asthma, DAVIS, GERD, pulmonary hypertension history, and RLS. Her chief complaint is \par \par -she notes feeling generally good\par -she notes SOB on stairs\par -she notes vaccine hesitancy \par -she notes energy level is \par -she notes interrupted sleep with 2-3 times up on average \par -she denies coughing and wheezing\par -she notes allergies controlled\par - She  notes bowels are regular \par -she denies taking any new medications, vitamins, or supplements. \par -she notes use of Molt ac given by cardiologist that has not improved SOB on stairs\par -s/p Covid 19 infection 1/2021\par -she notes stress test pending due to implant \par \par - She  denies any visual issues, headaches, nausea, vomiting, fever, chills, sweats, chest pains, chest pressure, diarrhea, constipation, dysphagia, myalgia, dizziness, leg swelling, leg pain, itchy eyes, itchy ears, heartburn, reflux, or sour taste in the mouth.

## 2021-08-14 NOTE — DISCHARGE NOTE NURSING/CASE MANAGEMENT/SOCIAL WORK - NSDCPNINST_GEN_ALL_CORE
show
Patient provided Post pacemaker discharge instructions. Appointment made with JOHNNIE NP to follow up with Dr. Gama.

## 2021-08-16 ENCOUNTER — APPOINTMENT (OUTPATIENT)
Dept: CT IMAGING | Facility: CLINIC | Age: 81
End: 2021-08-16
Payer: MEDICARE

## 2021-08-16 ENCOUNTER — OUTPATIENT (OUTPATIENT)
Dept: OUTPATIENT SERVICES | Facility: HOSPITAL | Age: 81
LOS: 1 days | End: 2021-08-16
Payer: MEDICARE

## 2021-08-16 DIAGNOSIS — R93.89 ABNORMAL FINDINGS ON DIAGNOSTIC IMAGING OF OTHER SPECIFIED BODY STRUCTURES: ICD-10-CM

## 2021-08-16 DIAGNOSIS — Z90.710 ACQUIRED ABSENCE OF BOTH CERVIX AND UTERUS: Chronic | ICD-10-CM

## 2021-08-16 PROCEDURE — 71250 CT THORAX DX C-: CPT

## 2021-08-16 PROCEDURE — 71250 CT THORAX DX C-: CPT | Mod: 26,MH

## 2021-09-01 NOTE — ASU PREOP CHECKLIST - ORDERS/MEDICATION ADMINISTRATION RECORD ON CHART
Spinal Block      Patient reassessed immediately prior to procedure    Patient location during procedure: OR  Indication:at surgeon's request  Performed By  CRNA: Lissett Cardoza CRNA  Preanesthetic Checklist  Completed: patient identified, IV checked, site marked, risks and benefits discussed, surgical consent, monitors and equipment checked, pre-op evaluation and timeout performed  Spinal Block Prep:  Patient Position:sitting  Sterile Tech:cap, gloves, sterile barriers and mask  Prep:Chloraprep  Patient Monitoring:blood pressure monitoring, continuous pulse oximetry and EKG  Spinal Block Procedure  Approach:midline  Guidance:landmark technique and palpation technique  Location:L4-L5  Needle Type:Sprotte  Needle Gauge:25 G  Placement of Spinal needle event:cerebrospinal fluid aspirated  Paresthesia: no  Fluid Appearance:clear     Post Assessment  Patient Tolerance:patient tolerated the procedure well with no apparent complications  Complications no  Additional Notes  Procedure:  Pt assisted to sitting position, with legs in position of comfort over side of bed.  Pt. instructed in optimal spine presentation, the spine was prepped/ Draped and the skin at insertion site was anesthetized with 1% Lidocaine 2 ml.  The spinal needle was then advanced until CSF flow was obtained and LA was injected:                   
done

## 2021-09-07 NOTE — PRE-OP CHECKLIST - ALLERGIES REVIEWED
Called patient in regards to recent vascular imaging results.     Reason For Follow up:   · 3/05/2021: s/p right renal mass cryoablation with Dr Blackmon    7/28/2021: CT scan   IMPRESSION:  No CT evidence of focal local recurrent disease.  No CT evidence of active neoplastic metastatic spread to the chest abdomen or pelvis.  Previous partial right nephrectomy normal postsurgical changes.    Discussed and reviewed with Dr Blackmon, IR agreeable to CT scan that Dr Dobson ordered for 1-3/2021    --Will send reminder to look at scan   -Sent to Tiffanie Flores as reminder (CCed her)      Belle Johns PA-C  Available in secure Epic chat  Interventional Radiology   Staffed case with Dr Blackmon  
done

## 2021-09-09 ENCOUNTER — NON-APPOINTMENT (OUTPATIENT)
Age: 81
End: 2021-09-09

## 2021-12-16 ENCOUNTER — APPOINTMENT (OUTPATIENT)
Dept: PULMONOLOGY | Facility: CLINIC | Age: 81
End: 2021-12-16

## 2021-12-21 NOTE — PATIENT PROFILE ADULT - FUNCTIONAL SCREEN CURRENT LEVEL: EATING, MLM
Device was checked during today's visit. It shows adequate battery longevity. Stable lead trends, sensing, and impedances were noted. Remote check in 3 months. Follow up in 6 months.     Summary:   NORMAL FUNCTION.  Presenting Rhythm:  AP/VS  Underlying Rhythm:  SB  Battery Voltage/Longevity:  2.95 V / 2.5 yrs  Percent Pacin% AP; 0.4%   Tachycardia Detections/Episodes:  138 treated AT/AF - 70/138 paced terminated; 4 Monitored VT; 223 AT/AF - longest lasting 86 sec - burden = 0.4%   0 = independent

## 2022-02-16 ENCOUNTER — OUTPATIENT (OUTPATIENT)
Dept: OUTPATIENT SERVICES | Facility: HOSPITAL | Age: 82
LOS: 1 days | End: 2022-02-16
Payer: MEDICARE

## 2022-02-16 ENCOUNTER — APPOINTMENT (OUTPATIENT)
Dept: RADIOLOGY | Facility: CLINIC | Age: 82
End: 2022-02-16
Payer: MEDICARE

## 2022-02-16 DIAGNOSIS — Z90.710 ACQUIRED ABSENCE OF BOTH CERVIX AND UTERUS: Chronic | ICD-10-CM

## 2022-02-16 DIAGNOSIS — M85.89 OTHER SPECIFIED DISORDERS OF BONE DENSITY AND STRUCTURE, MULTIPLE SITES: ICD-10-CM

## 2022-02-16 PROCEDURE — 77080 DXA BONE DENSITY AXIAL: CPT

## 2022-02-16 PROCEDURE — 77080 DXA BONE DENSITY AXIAL: CPT | Mod: 26

## 2022-03-24 ENCOUNTER — APPOINTMENT (OUTPATIENT)
Dept: PULMONOLOGY | Facility: CLINIC | Age: 82
End: 2022-03-24
Payer: MEDICARE

## 2022-03-24 ENCOUNTER — NON-APPOINTMENT (OUTPATIENT)
Age: 82
End: 2022-03-24

## 2022-03-24 VITALS
TEMPERATURE: 96.6 F | SYSTOLIC BLOOD PRESSURE: 110 MMHG | WEIGHT: 164 LBS | RESPIRATION RATE: 17 BRPM | HEIGHT: 61 IN | BODY MASS INDEX: 30.96 KG/M2 | HEART RATE: 82 BPM | DIASTOLIC BLOOD PRESSURE: 80 MMHG | OXYGEN SATURATION: 98 %

## 2022-03-24 DIAGNOSIS — Z71.89 OTHER SPECIFIED COUNSELING: ICD-10-CM

## 2022-03-24 PROCEDURE — 99214 OFFICE O/P EST MOD 30 MIN: CPT | Mod: CS,25

## 2022-03-24 PROCEDURE — 95012 NITRIC OXIDE EXP GAS DETER: CPT

## 2022-03-24 PROCEDURE — 94010 BREATHING CAPACITY TEST: CPT

## 2022-03-24 NOTE — HISTORY OF PRESENT ILLNESS
[FreeTextEntry1] : Ms. Ponce is a 81 year old female presenting to the office for a follow up visit for allergic rhinitis, asthma, DAVIS, GERD, pulmonary hypertension history, and RLS. Her chief complaint is \par \par -she notes generally feeling alright\par -she notes sleeping fluctuates \par -she notes sleeping for 5-6 hrs at night and with intermittent naps during the day \par -she notes weight is stable \par -she notes intermittent sicca\par -she notes vision decreased from baseline\par -she notes sense of smell and taste is stable \par -she notes physical therapy twice a week for exercise \par -she notes memory has decreased from baseline \par -She notes that bowels are regular \par -she notes mild arthritis \par -she notes cardiac issues are unchanged \par \par \par -denies any visual issues, headaches, nausea, vomiting, fever, chills, sweats, chest pain, chest pressure, diarrhea, constipation, dysphagia, dizziness, leg swelling, leg pain, itchy eyes, itchy ears, heartburn, reflux, or sour taste in the mouth.

## 2022-03-24 NOTE — PROCEDURE
[FreeTextEntry1] : FENO was 25 ; a normal value being less than 25\par Fractional exhaled nitric oxide (FENO) is regarded as a simple, noninvasive method for assessing eosinophilic airway inflammation. Produced by a variety of cells within the lung, nitric oxide (NO) concentrations are generally low in healthy individuals. However, high concentrations of NO appear to be involved in nonspecific host defense mechanisms and chronic inflammatory diseases such as asthma. The American Thoracic Society (ATS) therefore has recommended using FENO to aid in the diagnosis and monitoring of eosinophilic airway inflammation and asthma, and for identifying steroid responsive individuals whose chronic respiratory symptoms may be caused by airway inflammation. \par \par PFT revealed normal flows, with a FEV1 of 1.58L,which is 93% of predicted with a normal flow volume loop \par \par CT chest (8.16.2021) revealed No significant change since 6/18/2020.

## 2022-03-24 NOTE — ADDENDUM
[FreeTextEntry1] : Documented by Tuan Novoa acting as a scribe for Dr. Haroldo Mendoza on 03/24/2022 \par \par All medical record entries made by the Scribe were at my, Dr. Haroldo Mendoza's, direction and personally dictated by me on 03/24/2022 . I have reviewed the chart and agree that the record accurately reflects my personal performance of the history, physical exam, assessment and plan. I have also personally directed, reviewed, and agree with the discharge instructions

## 2022-03-24 NOTE — ASSESSMENT
[FreeTextEntry1] : Ms. Ponce is a 81 year old female with a history of asthma, allergies, GERD, ?PAH, and A-fib. 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 - s/p discontinuation of Amiodarone - now stable - near baseline - improvement continues - COVID 19 1/2021 stable (resolved) -mild DAVIS- imporved \par  \par Her shortness of breath is multifactorial due to:\par -asthma\par -poor breathing mechanics\par -questionable pulmonary arterial hypertension\par -cardiac disease\par -overweight\par \par \par problem 1: asthma (stable)\par -continue Trelegy 100 1 inhalation QD\par -continue Singulair 10 mg before bed \par -continue Xopenex (0.63) TID by nebulizer, PRN  (gargle and rinse after use)\par -Asthma is believed to be caused by inherited (genetic) and environmental factor, but its exact cause is unknown. Asthma may be triggered by allergens, lung infections, or irritants in the air. Asthma triggers are different for each person\par -Inhaler technique reviewed as well as oral hygiene techniques reviewed with patient. Avoidance of cold air, extremes of temperature, rescue inhaler should be used before exercise. Order of medication reviewed with patient. Recommended use of a cool mist humidifier in the bedroom.\par \par Problem 1A: s/p COVID 19 infection (1/2021) \par -s/p Covid 19 vaccine x2\par -educated patient on COVID 19 vaccine and appropriate recommendations \par COVID-19 precautionary Immune Support Recommendations:\par -OTC Vitamin C 1000mg BID \par -OTC Quercetin 500mg BID \par -OTC Zinc 50 mg per day \par -OTC Melatonin 5 mg a night \par -OTC Vitamin D 2000mg per day \par -OTC Tonic Water 8oz per day\par -Water 0.5-1 gallon per day\par \par Additional Support Supplements: \par -Liposomal Glutathione 500 mg BID\par -SPM 1 tablet BID \par -Berberine 1000 mg BID  \par \par problem 2: allergy/ post nasal drip syndrome\par  - continue Flonase 1 sniff/nostriL BID\par -followed by Valarie (.15) 1 sniff/nostril BID\par -Environmental measures for allergies were encouraged including mattress and pillow cover, air purifier, and environmental controls.\par \par problem 3: cardiac disease/ A-fib (off Amiodarone)\par -continue to follow up with Dr. Wise\par -Echo pending\par \par Problem 3A: s/p Amiodarone / effusions - abnormal CT (yearly)\par -s/p nc chest CT - 6/2020 HRCT prone and supine - next 8/2022 \par \par -The fluid between the lung and chest wall and it is of concern. A formal/definitive evaluation by a thoracic surgeon; a thoracentesis is only a temporizing situation- definitive therapy is needed. \par - Amiodarone/bleomycin/methotrexate and other drugs have been associated with pulmonary parenchymal damage. These drugs require pulmonary function testing including DLCO regularly and possible CT/CXR if respiratory complaints develop. PFTs should be performed at 6 month intervals.\par \par problem 4: poor breathing mechanics\par -Proper breathing techniques were reviewed with an emphasis of exhalation. Patient instructed to breath in for 1 second and out for four seconds. Patient was encouraged to not talk while walking.\par \par problem 5: GERD\par -Protonix 40 mg before breakfast\par -continue to use Carafate 1 g before each meal, PRN \par \par -Things to avoid including overeating, spicy foods, tight clothing, eating within three hours of bed, this list is not all inclusive. \par -For treatment of reflux, possible options discussed including diet control, H2 blockers, PPIs, as well as coating motility agents discussed as treatment options. Timing of meals and proximity of last meal to sleep were discussed. If symptoms persist, a formal gastrointestinal evaluation is needed.\par \par problem 6: low vitamin d\par -continue to use low vitamin d therapy\par -Has been associated with asthma exacerbations and increased allergic symptoms. The goal based on recent information is maintaining levels between 50-70 and low normal is 30. Recommended 50,000 units every two weeks to once a month depending on the level.\par \par problem 7: primary snoring/poor sleep (?RLS)\par -recommended to try Sleep Guard by [employer] \par -continue Requip 0.5 mg QHS \par -recommended to use Oxy-Aid by [employer] \par -sleep study was not consistent with sleep apnea \par \par problem 8: leg cramping / back pain- improved \par -continue Tizanidine 2 mg BID up to TiD\par -recommended to try Myocalm PM\par - Recommended Joint Guard\par - Recommended Pain Guard\par \par Problem 9: Health Maintenance COVID 19 \par -s/p Covid 19 vaccine x2\par - Clean your hands often. Wash your hands often with soap and water for at least 20 seconds, especially after blowing your nose, coughing, or sneezing, or having been in a public place.\par - If soap and water are not available, use a hand  that contains at least 60% alcohol.\par - To the extent possible, avoid touching high-touch surfaces in public places - elevator buttons, door handles, handrails, handshaking with people, etc. Use a tissue or your sleeve to cover your hand or finger if you must touch something.\par - Wash your hands after touching surfaces in public places.\par - Avoid touching your face, nose, eyes, etc.\par - Clean and disinfect your home to remove germs: practice routine cleaning of frequently touched surfaces (for example: tables, doorknobs, light switches, handles, desks, toilets, faucets, sinks & cell phones)\par - Avoid crowds, especially in poorly ventilated spaces. Your risk of exposure to respiratory viruses like COVID-19 may increase in crowded, closed-in settings with little air circulation if there are people in the crowd who are sick. All patients are recommended to practice social distancing and stay at least 6 feet away from others.\par - Avoid all non-essential travel including plane trips, and especially avoid embarking on cruise ships.\par -If COVID-19 is spreading in your community, take extra measures to put distance between yourself and other people to further reduce your risk of being exposed to this new virus.\par -Stay home as much as possible.\par - Consider ways of getting food brought to your house through family, social, or commercial networks\par -Be aware that the virus has been known to live in the air up to 3 hours post exposure, cardboard up to 24 hours post exposure, copper up to 4 hours post exposure, steel and plastic up to 2-3 days post exposure. Risk of transmission from these surfaces are affected by many variables.\par Immune Support Recommendations:\par -OTC Vitamin C 500mg BID \par -OTC Quercetin 250-500mg BID \par -OTC Zinc 75-100mg per day \par -OTC Melatonin 1 or 2 mg a night \par -OTC Vitamin D 1-4000mg per day \par -OTC Tonic Water 8oz per day\par Asthma and COVID19:\par You need to make sure your asthma is under control. This often requires the use of inhaled corticosteroids (and sometimes oral corticosteroids). Inhaled corticosteroids do not likely reduce your immune system’s ability to fight infections, but oral corticosteroids may. It is important to use the steps above to protect yourself to limit your exposure to any respiratory virus. \par \par problem 10: health maintenance \par - s/p 2021 flu shot \par -strep pneumonia vaccines: Prevnar-13 vaccine, followed by Pneumo vaccine 23 one year following (completed)\par -early intervention for URIs\par -regular osteoporosis evaluations\par -early dermatological evaluations\par -after the age of 50 to the age of 72, colonoscopy every 5 years \par \par F/U in 4 months \par She is encouraged to call with any changes, concerns, or questions.

## 2022-08-08 ENCOUNTER — APPOINTMENT (OUTPATIENT)
Dept: ORTHOPEDIC SURGERY | Facility: CLINIC | Age: 82
End: 2022-08-08

## 2022-08-08 VITALS — WEIGHT: 164 LBS | HEIGHT: 61 IN | BODY MASS INDEX: 30.96 KG/M2

## 2022-08-08 DIAGNOSIS — M75.50 BURSITIS OF UNSPECIFIED SHOULDER: ICD-10-CM

## 2022-08-08 DIAGNOSIS — M75.51 BURSITIS OF RIGHT SHOULDER: ICD-10-CM

## 2022-08-08 PROCEDURE — 73030 X-RAY EXAM OF SHOULDER: CPT | Mod: RT

## 2022-08-08 PROCEDURE — 20611 DRAIN/INJ JOINT/BURSA W/US: CPT | Mod: RT

## 2022-08-08 PROCEDURE — 99204 OFFICE O/P NEW MOD 45 MIN: CPT | Mod: 25,57

## 2022-08-08 RX ORDER — TRAMADOL HYDROCHLORIDE 50 MG/1
50 TABLET, COATED ORAL 4 TIMES DAILY
Qty: 30 | Refills: 0 | Status: ACTIVE | COMMUNITY
Start: 2022-08-08 | End: 1900-01-01

## 2022-08-08 RX ORDER — CYCLOBENZAPRINE HYDROCHLORIDE 10 MG/1
10 TABLET, FILM COATED ORAL
Qty: 20 | Refills: 0 | Status: ACTIVE | COMMUNITY
Start: 2022-08-08 | End: 1900-01-01

## 2022-08-08 NOTE — HISTORY OF PRESENT ILLNESS
[de-identified] : 80 yo female presenting with her daughter c/o severe right retroscapular pain x 2-3 weeks. Denies any specific injury or trauma. Pain has been getting progressively worse. Denies any numbness or tingling. Pain is isolated to the restroscapular aspect of her shoulder. Pain is described as a burning pain, tender to palpation. Denies any previous issues to her shoulder. Denies any known cervical issues.\par

## 2022-08-08 NOTE — PHYSICAL EXAM
[Right] : right shoulder [There are no fractures, subluxations or dislocations. No significant abnormalities are seen] : There are no fractures, subluxations or dislocations. No significant abnormalities are seen [No bony abnormalities] : No bony abnormalities [Type 2 acromion] : Type 2 acromion [de-identified] : Constitutional: The general appearance of the patient is well developed, well nourished, no deformities and well groomed. Normal \par \par Gait: Gait and function is as follows: normal gait. \par \par Skin: Head and neck visualized skin is normal. Left upper extremity visualized skin is normal. Right upper extremity visualized skin is normal. Thoracic Skin of the thoracic spine shows visualized skin is normal. \par \par Cardiovascular: palpable radial pulse bilaterally, good capillary refill in digits of the bilateral upper extremities and no temperature or color changes in the bilateral upper extremities. \par \par Lymphatic: Normal Palpation of lymph nodes in the cervical. \par \par Neurologic: fine motor control in the bilateral upper extremities is intact. Deep Tendon Reflexes in Upper and Lower Extremities Negative Benavidez's in the bilateral upper extremities. The patient is oriented to time, place and person. Sensation to light touch intact in the bilateral upper extremities. Mood and Affect is normal. \par \par Right Shoulder: Inspection of the shoulder/upper arm is as follows: no scapula winging, no biceps deformity and no AC joint deformity. Palpation of the shoulder/upper arm is as follows: There is tenderness at the proximal biceps tendon. Range of motion of the shoulder is as follows: Pain with internal rotation, external rotation, abduction and forward flexion. Strength of the shoulder is as follows: Supraspinatus 4/5. External Rotation 4/5. Internal Rotation 4/5. Deltoid 5/5 Ligament Stability and Special Tests of the shoulder is as follows: Neer test is positive. Jean Baptiste' test is positive. Speed's test is positive. \par \par Left Shoulder: Inspection of the shoulder/upper arm is as follows: There is a full, pain-free, stable range of motion of the shoulder with normal strength and no tenderness to palpation. \par \par Neck: \par Inspection / Palpation of the cervical spine is as follows: There is a full, pain free, stable range of motion of the cervical spine with normal tone and no tenderness to palpation. \par \par \par Back, including spine: Inspection / Palpation of the thoracic/lumbar spine is as follows: There is a full, pain free, stable range of motion of the thoracic spine with a normal tone and not tenderness to palpation..\par

## 2022-08-08 NOTE — DISCUSSION/SUMMARY
[de-identified] : 80 yo female presenting with her daughter c/o severe right retroscapular pain x 2-3 weeks. Denies any specific injury or trauma.\par x-rays today are non-diagnostic \par Patient received a PT prescription to be followed according to scapular dyskinesia protocol\par Patient was treated with US guided retroscapualr injection to aid in decrease of pain and inflammation\par Patient was prescribed a small prescription of Tramadol for intermittent pain relief \par She was also prescribed a muscle relaxer to be taken before bed \par Recommended referral to Dr. Beth for pain management if pain continues \par Follow up as needed \par \par \par (1) We discussed a comprehensive treatment plans that included a prescription management plan involving the use of prescription strength medications to include Ibuprofen 600-800 mg TID, versus 500-650 mg Tylenol. We also discussed prescribing topical diclofenac (Voltaren gel) as well as once daily Meloxicam 15 mg.\par (2) The patient has More Than One chronic injuries/illnesses as outlined, discussed, and documented by ICD 10 codes listed, as well as the HPI and Plan section.\par There is a moderate risk of morbidity with further treatment, especially from use of prescription strength medications and possible side effects of these medications which include upset stomach and cardiac/renal issues with long term use were discussed.\par (3) I recommended that the patient follow-up with their medical physician to discuss any significant specific potential issues with long term use such as interactions with current medications or with exacerbation of underlying medical morbidities. \par \par Attestation:\par I, Gi Henry , attest that this documentation has been prepared under the direction and in the presence of Provider Alon Randolph MD.\par The documentation recorded by the scribe, in my presence, accurately reflects the service I personally performed, and the decisions made by me with my edits as appropriate.\par Alon Randolph MD\par \par

## 2022-08-08 NOTE — PROCEDURE
[Large Joint Injection] : Large joint injection [Right] : of the right [Shoulder] : shoulder [Pain] : pain [Inflammation] : inflammation [Ethyl Chloride sprayed topically] : ethyl chloride sprayed topically [Sterile technique used] : sterile technique used [___ cc    1%] : Lidocaine ~Vcc of 1%  [___ cc    10mg] : Triamcinolone (Kenalog) ~Vcc of 10 mg

## 2022-08-09 ENCOUNTER — APPOINTMENT (OUTPATIENT)
Dept: ORTHOPEDIC SURGERY | Facility: CLINIC | Age: 82
End: 2022-08-09

## 2022-08-10 RX ORDER — TIZANIDINE 4 MG/1
4 TABLET ORAL 3 TIMES DAILY
Qty: 30 | Refills: 0 | Status: ACTIVE | COMMUNITY
Start: 2022-08-10 | End: 1900-01-01

## 2022-08-23 ENCOUNTER — APPOINTMENT (OUTPATIENT)
Dept: CT IMAGING | Facility: CLINIC | Age: 82
End: 2022-08-23

## 2022-08-23 ENCOUNTER — OUTPATIENT (OUTPATIENT)
Dept: OUTPATIENT SERVICES | Facility: HOSPITAL | Age: 82
LOS: 1 days | End: 2022-08-23

## 2022-08-23 ENCOUNTER — OUTPATIENT (OUTPATIENT)
Dept: OUTPATIENT SERVICES | Facility: HOSPITAL | Age: 82
LOS: 1 days | End: 2022-08-23
Payer: MEDICARE

## 2022-08-23 DIAGNOSIS — E78.2 MIXED HYPERLIPIDEMIA: ICD-10-CM

## 2022-08-23 DIAGNOSIS — R93.89 ABNORMAL FINDINGS ON DIAGNOSTIC IMAGING OF OTHER SPECIFIED BODY STRUCTURES: ICD-10-CM

## 2022-08-23 DIAGNOSIS — Z90.710 ACQUIRED ABSENCE OF BOTH CERVIX AND UTERUS: Chronic | ICD-10-CM

## 2022-08-23 PROCEDURE — 75571 CT HRT W/O DYE W/CA TEST: CPT | Mod: 26,MH

## 2022-08-23 PROCEDURE — 71250 CT THORAX DX C-: CPT | Mod: 26,MH

## 2022-08-23 PROCEDURE — 71250 CT THORAX DX C-: CPT

## 2022-08-23 PROCEDURE — 75571 CT HRT W/O DYE W/CA TEST: CPT | Mod: MH

## 2022-09-22 ENCOUNTER — APPOINTMENT (OUTPATIENT)
Dept: PULMONOLOGY | Facility: CLINIC | Age: 82
End: 2022-09-22

## 2022-09-22 ENCOUNTER — APPOINTMENT (OUTPATIENT)
Dept: PAIN MANAGEMENT | Facility: CLINIC | Age: 82
End: 2022-09-22

## 2022-09-22 VITALS
BODY MASS INDEX: 27.66 KG/M2 | DIASTOLIC BLOOD PRESSURE: 86 MMHG | RESPIRATION RATE: 16 BRPM | TEMPERATURE: 97.4 F | HEIGHT: 64 IN | OXYGEN SATURATION: 96 % | WEIGHT: 162 LBS | SYSTOLIC BLOOD PRESSURE: 148 MMHG | HEART RATE: 83 BPM

## 2022-09-22 DIAGNOSIS — U07.1 COVID-19: ICD-10-CM

## 2022-09-22 PROCEDURE — 94729 DIFFUSING CAPACITY: CPT

## 2022-09-22 PROCEDURE — 94727 GAS DIL/WSHOT DETER LNG VOL: CPT

## 2022-09-22 PROCEDURE — 94010 BREATHING CAPACITY TEST: CPT

## 2022-09-22 PROCEDURE — 95012 NITRIC OXIDE EXP GAS DETER: CPT

## 2022-09-22 PROCEDURE — 99214 OFFICE O/P EST MOD 30 MIN: CPT | Mod: CS,25

## 2022-09-22 RX ORDER — METHYLPREDNISOLONE 4 MG/1
4 TABLET ORAL
Qty: 1 | Refills: 0 | Status: DISCONTINUED | COMMUNITY
Start: 2022-08-08 | End: 2022-09-22

## 2022-09-22 NOTE — PROCEDURE
[FreeTextEntry1] : Full PFT revealed normal flows, with a FEV1 of 1.64L, which is 97% of predicted, hyperinflation, RV is 135% of predicted, and a diffusion of 19.4, which is 107% of predicted, with a normal flow volume loop\par \par CT chest (8/31/2022) revealed stable 0.2 cm nodule in the apical segment of the left upper lobe when compared to previous exam.

## 2022-09-22 NOTE — ADDENDUM
[FreeTextEntry1] : Documented by Heath Yost acting as a scribe for Dr. Haroldo Mendoza on 09/22/2022.\par \par All medical record entries made by the Scribe were at my, Dr. Haroldo Mendoza's, direction and personally dictated by me on 09/22/2022. I have reviewed the chart and agree that the record accurately reflects my personal performance of the history, physical exam, assessment and plan. I have also personally directed, reviewed, and agree with the discharge instructions.

## 2022-09-22 NOTE — ASSESSMENT
[FreeTextEntry1] : Ms. Ponce is a 82 year old female with a history of asthma, allergies, GERD, ?PAH, and A-fib. 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 - s/p discontinuation of Amiodarone - now stable - near baseline - improvement continues - COVID 19 1/2021 and 8/2022, stable (resolved) -mild DAVIS\par  \par Her shortness of breath is multifactorial due to:\par -asthma\par -poor breathing mechanics\par -questionable pulmonary arterial hypertension\par -cardiac disease\par -overweight\par \par \par problem 1: asthma - NC\par -continue Trelegy 100 1 inhalation QD\par -continue Singulair 10 mg before bed \par -continue Xopenex (0.63) TID by nebulizer, PRN  (gargle and rinse after use)\par -Asthma is believed to be caused by inherited (genetic) and environmental factor, but its exact cause is unknown. Asthma may be triggered by allergens, lung infections, or irritants in the air. Asthma triggers are different for each person\par -Inhaler technique reviewed as well as oral hygiene techniques reviewed with patient. Avoidance of cold air, extremes of temperature, rescue inhaler should be used before exercise. Order of medication reviewed with patient. Recommended use of a cool mist humidifier in the bedroom.\par \par Problem 1A: s/p COVID 19 infection (1/2021, 8/2022) \par -s/p Covid 19 vaccine x2\par -educated patient on COVID 19 vaccine and appropriate recommendations \par COVID-19 precautionary Immune Support Recommendations:\par -OTC Vitamin C 1000mg BID \par -OTC Quercetin 500mg BID \par -OTC Zinc 50 mg per day \par -OTC Melatonin 5 mg a night \par -OTC Vitamin D 2000mg per day \par -OTC Tonic Water 8oz per day\par -Water 0.5-1 gallon per day\par \par Additional Support Supplements: \par -Liposomal Glutathione 500 mg BID\par -SPM 1 tablet BID \par -Berberine 1000 mg BID  \par \par problem 2: allergy/ post nasal drip syndrome\par  - continue Flonase 1 sniff/nostriL BID\par -followed by Valarie (.15) 1 sniff/nostril BID\par -Environmental measures for allergies were encouraged including mattress and pillow cover, air purifier, and environmental controls.\par \par problem 3: cardiac disease/ A-fib (off Amiodarone)\par -contemplating cardiac ablation\par -continue to follow up with Dr. Wise\par -Echo pending\par \par Problem 3A: s/p Amiodarone / effusions - abnormal CT (yearly)\par -s/p nc chest CT - 6/2020 HRCT prone and supine - last 8/2022, next 8/2023\par \par -The fluid between the lung and chest wall and it is of concern. A formal/definitive evaluation by a thoracic surgeon; a thoracentesis is only a temporizing situation- definitive therapy is needed. \par - Amiodarone/bleomycin/methotrexate and other drugs have been associated with pulmonary parenchymal damage. These drugs require pulmonary function testing including DLCO regularly and possible CT/CXR if respiratory complaints develop. PFTs should be performed at 6 month intervals.\par \par problem 4: poor breathing mechanics\par -Proper breathing techniques were reviewed with an emphasis of exhalation. Patient instructed to breath in for 1 second and out for four seconds. Patient was encouraged to not talk while walking.\par \par problem 5: GERD\par -Protonix 40 mg before breakfast\par -continue to use Carafate 1 g before each meal, PRN \par \par -Things to avoid including overeating, spicy foods, tight clothing, eating within three hours of bed, this list is not all inclusive. \par -For treatment of reflux, possible options discussed including diet control, H2 blockers, PPIs, as well as coating motility agents discussed as treatment options. Timing of meals and proximity of last meal to sleep were discussed. If symptoms persist, a formal gastrointestinal evaluation is needed.\par \par problem 6: low vitamin d\par -continue to use low vitamin d therapy\par -Has been associated with asthma exacerbations and increased allergic symptoms. The goal based on recent information is maintaining levels between 50-70 and low normal is 30. Recommended 50,000 units every two weeks to once a month depending on the level.\par \par problem 7: primary snoring/poor sleep (?RLS)\par -recommended to try Sleep Guard by [employer] \par -continue Requip 0.5 mg QHS \par -recommended to use Oxy-Aid by [employer] \par -sleep study was not consistent with sleep apnea \par \par problem 8: leg cramping / back pain- improved \par -continue Tizanidine 2 mg BID up to TiD\par -recommended to try Myocalm PM\par - Recommended Joint Guard\par - Recommended Pain Guard\par \par Problem 9: Health Maintenance COVID 19 \par -s/p Covid 19 vaccine x2 (no booster needed 9/2022)\par - Clean your hands often. Wash your hands often with soap and water for at least 20 seconds, especially after blowing your nose, coughing, or sneezing, or having been in a public place.\par - If soap and water are not available, use a hand  that contains at least 60% alcohol.\par - To the extent possible, avoid touching high-touch surfaces in public places - elevator buttons, door handles, handrails, handshaking with people, etc. Use a tissue or your sleeve to cover your hand or finger if you must touch something.\par - Wash your hands after touching surfaces in public places.\par - Avoid touching your face, nose, eyes, etc.\par - Clean and disinfect your home to remove germs: practice routine cleaning of frequently touched surfaces (for example: tables, doorknobs, light switches, handles, desks, toilets, faucets, sinks & cell phones)\par - Avoid crowds, especially in poorly ventilated spaces. Your risk of exposure to respiratory viruses like COVID-19 may increase in crowded, closed-in settings with little air circulation if there are people in the crowd who are sick. All patients are recommended to practice social distancing and stay at least 6 feet away from others.\par - Avoid all non-essential travel including plane trips, and especially avoid embarking on cruise ships.\par -If COVID-19 is spreading in your community, take extra measures to put distance between yourself and other people to further reduce your risk of being exposed to this new virus.\par -Stay home as much as possible.\par - Consider ways of getting food brought to your house through family, social, or commercial networks\par -Be aware that the virus has been known to live in the air up to 3 hours post exposure, cardboard up to 24 hours post exposure, copper up to 4 hours post exposure, steel and plastic up to 2-3 days post exposure. Risk of transmission from these surfaces are affected by many variables.\par Immune Support Recommendations:\par -OTC Vitamin C 500mg BID \par -OTC Quercetin 250-500mg BID \par -OTC Zinc 75-100mg per day \par -OTC Melatonin 1 or 2 mg a night \par -OTC Vitamin D 1-4000mg per day \par -OTC Tonic Water 8oz per day\par Asthma and COVID19:\par You need to make sure your asthma is under control. This often requires the use of inhaled corticosteroids (and sometimes oral corticosteroids). Inhaled corticosteroids do not likely reduce your immune system’s ability to fight infections, but oral corticosteroids may. It is important to use the steps above to protect yourself to limit your exposure to any respiratory virus. \par \par problem 10: health maintenance \par - s/p 2021 flu shot \par -strep pneumonia vaccines: Prevnar-13 vaccine, followed by Pneumo vaccine 23 one year following (completed)\par -early intervention for URIs\par -regular osteoporosis evaluations\par -early dermatological evaluations\par -after the age of 50 to the age of 72, colonoscopy every 5 years \par \par F/U in 4 months \par She is encouraged to call with any changes, concerns, or questions.

## 2022-09-22 NOTE — HISTORY OF PRESENT ILLNESS
[FreeTextEntry1] : Ms. Ponce is a 82 year old female presenting to the office for a follow up visit for allergic rhinitis, asthma, DAVIS, GERD, pulmonary hypertension history, and RLS. Her chief complaint is \par -she notes sweats, chest pressure at all times, aches and pains, cough\par -she notes her bowels are regular \par -she denies hoarseness \par -she notes palpitations\par -no new medications, vitamins, or supplements \par -she notes that she is sleeping well (6 hours)\par -she notes her weight is stable \par -s/p COVID-19 infection 4 weeks ago which was not a serious case \par -she notes that she has not been taking her inhalers\par \par \par -patient denies any headaches, nausea, vomiting, fever, chills, wheezing, fatigue, diarrhea, constipation, dysphagia, dizziness, leg swelling, itchy eyes, itchy ears, heartburn, reflux or sour taste in the mouth

## 2022-10-04 DIAGNOSIS — E78.2 MIXED HYPERLIPIDEMIA: ICD-10-CM

## 2022-10-04 DIAGNOSIS — Z00.8 ENCOUNTER FOR OTHER GENERAL EXAMINATION: ICD-10-CM

## 2023-03-16 ENCOUNTER — OFFICE (OUTPATIENT)
Dept: URBAN - METROPOLITAN AREA CLINIC 12 | Facility: CLINIC | Age: 83
Setting detail: OPHTHALMOLOGY
End: 2023-03-16
Payer: MEDICARE

## 2023-03-16 DIAGNOSIS — H25.13: ICD-10-CM

## 2023-03-16 DIAGNOSIS — H40.013: ICD-10-CM

## 2023-03-16 PROCEDURE — 99204 OFFICE O/P NEW MOD 45 MIN: CPT | Performed by: STUDENT IN AN ORGANIZED HEALTH CARE EDUCATION/TRAINING PROGRAM

## 2023-03-16 PROCEDURE — 92250 FUNDUS PHOTOGRAPHY W/I&R: CPT | Performed by: STUDENT IN AN ORGANIZED HEALTH CARE EDUCATION/TRAINING PROGRAM

## 2023-03-16 ASSESSMENT — SUPERFICIAL PUNCTATE KERATITIS (SPK)
OS_SPK: 1+
OD_SPK: 1+

## 2023-03-16 ASSESSMENT — TONOMETRY
OS_IOP_MMHG: 13
OD_IOP_MMHG: 14

## 2023-03-16 ASSESSMENT — CONFRONTATIONAL VISUAL FIELD TEST (CVF)
OS_FINDINGS: FULL
OD_FINDINGS: FULL

## 2023-03-17 PROBLEM — H40.1111: Status: ACTIVE | Noted: 2023-03-16

## 2023-03-17 PROBLEM — H35.363 DRUSEN; BOTH EYES: Status: ACTIVE | Noted: 2023-03-16

## 2023-03-17 ASSESSMENT — REFRACTION_MANIFEST
OD_SPHERE: +2.50
OS_VA1: 20/50-2
OD_AXIS: 045
OS_CYLINDER: +1.00
OD_VA1: 20/50-2
OS_SPHERE: +2.00
OD_CYLINDER: -0.75
OS_AXIS: 165

## 2023-03-17 ASSESSMENT — KERATOMETRY
OD_AXISANGLE_DEGREES: 173
OD_K1POWER_DIOPTERS: 43.00
OS_K1POWER_DIOPTERS: 45.50
OD_K2POWER_DIOPTERS: 45.75
OS_K2POWER_DIOPTERS: 46.00
OS_AXISANGLE_DEGREES: 028

## 2023-03-17 ASSESSMENT — VISUAL ACUITY
OD_BCVA: 20/40-
OS_BCVA: 20/40-

## 2023-03-17 ASSESSMENT — SPHEQUIV_DERIVED
OD_SPHEQUIV: 2.125
OS_SPHEQUIV: 2.5
OD_SPHEQUIV: 2.125
OS_SPHEQUIV: 2.5

## 2023-03-17 ASSESSMENT — REFRACTION_CURRENTRX
OD_AXIS: 057
OS_OVR_VA: 20/
OS_CYLINDER: -0.25
OS_SPHERE: +1.25
OS_AXIS: 089
OD_OVR_VA: 20/
OD_CYLINDER: -0.25
OD_SPHERE: +1.25
OS_VPRISM_DIRECTION: PROGS
OD_VPRISM_DIRECTION: PROGS
OD_ADD: +2.75
OS_ADD: +2.75

## 2023-03-17 ASSESSMENT — AXIALLENGTH_DERIVED
OS_AL: 21.9191
OD_AL: 22.496
OD_AL: 22.496
OS_AL: 21.9191

## 2023-03-17 ASSESSMENT — REFRACTION_AUTOREFRACTION
OS_SPHERE: +2.00
OD_CYLINDER: -0.75
OD_AXIS: 043
OS_CYLINDER: +1.00
OS_AXIS: 167
OD_SPHERE: +2.50

## 2023-03-22 ENCOUNTER — APPOINTMENT (OUTPATIENT)
Dept: PULMONOLOGY | Facility: CLINIC | Age: 83
End: 2023-03-22

## 2023-04-27 ENCOUNTER — OFFICE (OUTPATIENT)
Dept: URBAN - METROPOLITAN AREA CLINIC 12 | Facility: CLINIC | Age: 83
Setting detail: OPHTHALMOLOGY
End: 2023-04-27
Payer: MEDICARE

## 2023-04-27 DIAGNOSIS — H40.1111: ICD-10-CM

## 2023-04-27 DIAGNOSIS — H35.363: ICD-10-CM

## 2023-04-27 DIAGNOSIS — H25.13: ICD-10-CM

## 2023-04-27 PROCEDURE — 99213 OFFICE O/P EST LOW 20 MIN: CPT | Performed by: STUDENT IN AN ORGANIZED HEALTH CARE EDUCATION/TRAINING PROGRAM

## 2023-04-27 PROCEDURE — 76514 ECHO EXAM OF EYE THICKNESS: CPT | Performed by: STUDENT IN AN ORGANIZED HEALTH CARE EDUCATION/TRAINING PROGRAM

## 2023-04-27 PROCEDURE — 92083 EXTENDED VISUAL FIELD XM: CPT | Performed by: STUDENT IN AN ORGANIZED HEALTH CARE EDUCATION/TRAINING PROGRAM

## 2023-04-27 PROCEDURE — 92020 GONIOSCOPY: CPT | Performed by: STUDENT IN AN ORGANIZED HEALTH CARE EDUCATION/TRAINING PROGRAM

## 2023-04-27 ASSESSMENT — REFRACTION_MANIFEST
OD_CYLINDER: -0.75
OS_SPHERE: +2.00
OS_AXIS: 165
OD_SPHERE: +2.50
OD_AXIS: 045
OD_VA1: 20/50-2
OS_VA1: 20/50-2
OS_CYLINDER: +1.00

## 2023-04-27 ASSESSMENT — REFRACTION_CURRENTRX
OS_VPRISM_DIRECTION: PROGS
OS_AXIS: 089
OD_ADD: +2.75
OS_SPHERE: +1.25
OD_VPRISM_DIRECTION: PROGS
OD_CYLINDER: -0.25
OS_OVR_VA: 20/
OD_OVR_VA: 20/
OS_CYLINDER: -0.25
OD_AXIS: 057
OD_SPHERE: +1.25
OS_ADD: +2.75

## 2023-04-27 ASSESSMENT — AXIALLENGTH_DERIVED
OS_AL: 22.2001
OD_AL: 22.2892
OD_AL: 22.2892
OS_AL: 22.2001

## 2023-04-27 ASSESSMENT — KERATOMETRY
OD_K1POWER_DIOPTERS: 44.75
OD_K2POWER_DIOPTERS: 45.25
OS_K2POWER_DIOPTERS: 45.50
OS_AXISANGLE_DEGREES: 006
OS_K1POWER_DIOPTERS: 44.25
OD_AXISANGLE_DEGREES: 175

## 2023-04-27 ASSESSMENT — REFRACTION_AUTOREFRACTION
OD_CYLINDER: -0.75
OD_AXIS: 044
OS_AXIS: 096
OS_CYLINDER: -1.00
OS_SPHERE: +3.00
OD_SPHERE: +2.50

## 2023-04-27 ASSESSMENT — SPHEQUIV_DERIVED
OD_SPHEQUIV: 2.125
OS_SPHEQUIV: 2.5
OS_SPHEQUIV: 2.5
OD_SPHEQUIV: 2.125

## 2023-04-27 ASSESSMENT — SUPERFICIAL PUNCTATE KERATITIS (SPK)
OD_SPK: 1+
OS_SPK: 1+

## 2023-04-27 ASSESSMENT — TONOMETRY: OS_IOP_MMHG: 10

## 2023-04-27 ASSESSMENT — VISUAL ACUITY
OS_BCVA: 20/40-1
OD_BCVA: 20/50-1

## 2023-04-27 ASSESSMENT — PACHYMETRY
OS_CT_CORRECTION: -1
OS_CT_UM: 553
OD_CT_UM: 578
OD_CT_CORRECTION: -2

## 2023-04-27 ASSESSMENT — CONFRONTATIONAL VISUAL FIELD TEST (CVF)
OD_FINDINGS: FULL
OS_FINDINGS: FULL

## 2023-05-12 ENCOUNTER — OUTPATIENT (OUTPATIENT)
Dept: OUTPATIENT SERVICES | Facility: HOSPITAL | Age: 83
LOS: 1 days | End: 2023-05-12
Payer: MEDICARE

## 2023-05-12 ENCOUNTER — APPOINTMENT (OUTPATIENT)
Dept: CT IMAGING | Facility: CLINIC | Age: 83
End: 2023-05-12
Payer: MEDICARE

## 2023-05-12 DIAGNOSIS — Z90.710 ACQUIRED ABSENCE OF BOTH CERVIX AND UTERUS: Chronic | ICD-10-CM

## 2023-05-12 DIAGNOSIS — Z00.8 ENCOUNTER FOR OTHER GENERAL EXAMINATION: ICD-10-CM

## 2023-05-12 PROCEDURE — 72131 CT LUMBAR SPINE W/O DYE: CPT | Mod: 26,MH

## 2023-05-12 PROCEDURE — 72131 CT LUMBAR SPINE W/O DYE: CPT | Mod: MH

## 2023-07-06 ENCOUNTER — APPOINTMENT (OUTPATIENT)
Dept: PULMONOLOGY | Facility: CLINIC | Age: 83
End: 2023-07-06

## 2023-07-25 ENCOUNTER — RX ONLY (RX ONLY)
Age: 83
End: 2023-07-25

## 2023-07-25 ENCOUNTER — OFFICE (OUTPATIENT)
Dept: URBAN - METROPOLITAN AREA CLINIC 12 | Facility: CLINIC | Age: 83
Setting detail: OPHTHALMOLOGY
End: 2023-07-25
Payer: MEDICARE

## 2023-07-25 DIAGNOSIS — H35.363: ICD-10-CM

## 2023-07-25 DIAGNOSIS — H25.13: ICD-10-CM

## 2023-07-25 DIAGNOSIS — H16.223: ICD-10-CM

## 2023-07-25 DIAGNOSIS — H52.4: ICD-10-CM

## 2023-07-25 DIAGNOSIS — H40.1111: ICD-10-CM

## 2023-07-25 PROCEDURE — 92015 DETERMINE REFRACTIVE STATE: CPT | Performed by: STUDENT IN AN ORGANIZED HEALTH CARE EDUCATION/TRAINING PROGRAM

## 2023-07-25 PROCEDURE — 92134 CPTRZ OPH DX IMG PST SGM RTA: CPT | Performed by: STUDENT IN AN ORGANIZED HEALTH CARE EDUCATION/TRAINING PROGRAM

## 2023-07-25 PROCEDURE — 92014 COMPRE OPH EXAM EST PT 1/>: CPT | Performed by: STUDENT IN AN ORGANIZED HEALTH CARE EDUCATION/TRAINING PROGRAM

## 2023-07-25 ASSESSMENT — AXIALLENGTH_DERIVED
OD_AL: 22.0861
OS_AL: 21.954
OS_AL: 22.384
OS_AL: 22.0387

## 2023-07-25 ASSESSMENT — REFRACTION_MANIFEST
OD_AXIS: 035
OS_AXIS: 165
OS_VA1: 20/25-2
OS_AXIS: 090
OD_VA1: 20/50-2
OS_CYLINDER: -1.00
OS_ADD: +2.75
OS_VA1: 20/50-2
OD_VA1: 20/30
OD_ADD: +2.75
OS_CYLINDER: -1.50
OS_SPHERE: +3.25
OD_SPHERE: +2.50
OD_CYLINDER: -0.75
OD_AXIS: 045
OD_SPHERE: +2.50
OD_CYLINDER: -0.75
OS_SPHERE: +2.00

## 2023-07-25 ASSESSMENT — REFRACTION_AUTOREFRACTION
OD_AXIS: 034
OS_SPHERE: +3.50
OD_SPHERE: +2.50
OS_AXIS: 086
OD_CYLINDER: -0.75
OS_CYLINDER: -1.50

## 2023-07-25 ASSESSMENT — REFRACTION_CURRENTRX
OS_VPRISM_DIRECTION: PROGS
OD_SPHERE: +1.50
OS_ADD: +2.50
OS_AXIS: 088
OS_SPHERE: +1.50
OD_AXIS: 058
OD_ADD: +2.75
OD_VPRISM_DIRECTION: PROGS
OD_CYLINDER: -0.25
OS_OVR_VA: 20/
OS_CYLINDER: -0.25
OD_OVR_VA: 20/

## 2023-07-25 ASSESSMENT — PACHYMETRY
OS_CT_CORRECTION: -1
OD_CT_CORRECTION: -2
OS_CT_UM: 553
OD_CT_UM: 578

## 2023-07-25 ASSESSMENT — SPHEQUIV_DERIVED
OS_SPHEQUIV: 2.75
OD_SPHEQUIV: 2.125
OS_SPHEQUIV: 2.5
OD_SPHEQUIV: 2.125
OS_SPHEQUIV: 1.5
OD_SPHEQUIV: 2.125

## 2023-07-25 ASSESSMENT — KERATOMETRY
OS_K1POWER_DIOPTERS: 44.75
OS_AXISANGLE_DEGREES: 010
OD_K1POWER_DIOPTERS: 45.25
OD_K2POWER_DIOPTERS: 46.00
OD_AXISANGLE_DEGREES: 155
OS_K2POWER_DIOPTERS: 46.00

## 2023-07-25 ASSESSMENT — VISUAL ACUITY
OS_BCVA: 20/30
OD_BCVA: 20/40-2

## 2023-07-25 ASSESSMENT — TONOMETRY: OS_IOP_MMHG: 10

## 2023-07-25 ASSESSMENT — SUPERFICIAL PUNCTATE KERATITIS (SPK)
OS_SPK: 1+
OD_SPK: 1+

## 2023-07-25 ASSESSMENT — CONFRONTATIONAL VISUAL FIELD TEST (CVF)
OS_FINDINGS: FULL
OD_FINDINGS: FULL

## 2023-09-07 ENCOUNTER — APPOINTMENT (OUTPATIENT)
Dept: NEUROLOGY | Facility: CLINIC | Age: 83
End: 2023-09-07

## 2023-10-11 ENCOUNTER — APPOINTMENT (OUTPATIENT)
Dept: PULMONOLOGY | Facility: CLINIC | Age: 83
End: 2023-10-11
Payer: MEDICARE

## 2023-10-11 VITALS
TEMPERATURE: 96.5 F | RESPIRATION RATE: 16 BRPM | SYSTOLIC BLOOD PRESSURE: 136 MMHG | BODY MASS INDEX: 27.66 KG/M2 | WEIGHT: 162 LBS | HEIGHT: 64 IN | HEART RATE: 86 BPM | DIASTOLIC BLOOD PRESSURE: 84 MMHG | OXYGEN SATURATION: 98 %

## 2023-10-11 DIAGNOSIS — Z23 ENCOUNTER FOR IMMUNIZATION: ICD-10-CM

## 2023-10-11 DIAGNOSIS — Z86.79 PERSONAL HISTORY OF OTHER DISEASES OF THE CIRCULATORY SYSTEM: ICD-10-CM

## 2023-10-11 PROCEDURE — 94727 GAS DIL/WSHOT DETER LNG VOL: CPT

## 2023-10-11 PROCEDURE — G0008: CPT

## 2023-10-11 PROCEDURE — 90662 IIV NO PRSV INCREASED AG IM: CPT

## 2023-10-11 PROCEDURE — 94618 PULMONARY STRESS TESTING: CPT

## 2023-10-11 PROCEDURE — 94010 BREATHING CAPACITY TEST: CPT

## 2023-10-11 PROCEDURE — 99214 OFFICE O/P EST MOD 30 MIN: CPT | Mod: 25

## 2023-10-11 PROCEDURE — 94729 DIFFUSING CAPACITY: CPT

## 2023-10-11 PROCEDURE — 95012 NITRIC OXIDE EXP GAS DETER: CPT

## 2023-10-11 RX ORDER — FLUTICASONE FUROATE, UMECLIDINIUM BROMIDE AND VILANTEROL TRIFENATATE 100; 62.5; 25 UG/1; UG/1; UG/1
100-62.5-25 POWDER RESPIRATORY (INHALATION)
Qty: 3 | Refills: 1 | Status: ACTIVE | COMMUNITY
Start: 2023-10-11 | End: 1900-01-01

## 2023-10-13 ENCOUNTER — OUTPATIENT (OUTPATIENT)
Dept: OUTPATIENT SERVICES | Facility: HOSPITAL | Age: 83
LOS: 1 days | End: 2023-10-13
Payer: MEDICARE

## 2023-10-13 ENCOUNTER — APPOINTMENT (OUTPATIENT)
Dept: CT IMAGING | Facility: CLINIC | Age: 83
End: 2023-10-13
Payer: MEDICARE

## 2023-10-13 DIAGNOSIS — Z90.710 ACQUIRED ABSENCE OF BOTH CERVIX AND UTERUS: Chronic | ICD-10-CM

## 2023-10-13 DIAGNOSIS — R93.89 ABNORMAL FINDINGS ON DIAGNOSTIC IMAGING OF OTHER SPECIFIED BODY STRUCTURES: ICD-10-CM

## 2023-10-13 PROCEDURE — 71250 CT THORAX DX C-: CPT

## 2023-10-13 PROCEDURE — 71250 CT THORAX DX C-: CPT | Mod: 26,MH

## 2023-11-14 ENCOUNTER — OFFICE (OUTPATIENT)
Dept: URBAN - METROPOLITAN AREA CLINIC 12 | Facility: CLINIC | Age: 83
Setting detail: OPHTHALMOLOGY
End: 2023-11-14
Payer: MEDICARE

## 2023-11-14 ENCOUNTER — RX ONLY (RX ONLY)
Age: 83
End: 2023-11-14

## 2023-11-14 DIAGNOSIS — H25.13: ICD-10-CM

## 2023-11-14 DIAGNOSIS — H35.373: ICD-10-CM

## 2023-11-14 DIAGNOSIS — H16.223: ICD-10-CM

## 2023-11-14 DIAGNOSIS — H40.1132: ICD-10-CM

## 2023-11-14 DIAGNOSIS — H35.363: ICD-10-CM

## 2023-11-14 PROCEDURE — 92083 EXTENDED VISUAL FIELD XM: CPT | Performed by: OPHTHALMOLOGY

## 2023-11-14 PROCEDURE — 92133 CPTRZD OPH DX IMG PST SGM ON: CPT | Performed by: OPHTHALMOLOGY

## 2023-11-14 PROCEDURE — 99214 OFFICE O/P EST MOD 30 MIN: CPT | Performed by: OPHTHALMOLOGY

## 2023-11-14 ASSESSMENT — REFRACTION_CURRENTRX
OS_SPHERE: +1.50
OD_CYLINDER: -0.25
OD_VPRISM_DIRECTION: PROGS
OS_CYLINDER: -0.25
OS_AXIS: 091
OD_CYLINDER: -0.25
OS_SPHERE: +1.25
OS_CYLINDER: -0.50
OD_OVR_VA: 20/
OD_AXIS: 039
OS_OVR_VA: 20/
OS_ADD: +3.00
OS_AXIS: 088
OS_OVR_VA: 20/
OD_ADD: +3.00
OS_VPRISM_DIRECTION: PROGS
OD_AXIS: 058
OD_OVR_VA: 20/
OS_VPRISM_DIRECTION: PROGS
OD_SPHERE: +1.50
OS_ADD: +2.50
OD_SPHERE: +1.25
OD_VPRISM_DIRECTION: PROGS
OD_ADD: +2.75

## 2023-11-14 ASSESSMENT — SPHEQUIV_DERIVED
OS_SPHEQUIV: 2.25
OS_SPHEQUIV: 2.5
OS_SPHEQUIV: 1.5
OD_SPHEQUIV: 2.125
OD_SPHEQUIV: 2.375
OD_SPHEQUIV: 2.125
OS_SPHEQUIV: 2.25
OD_SPHEQUIV: 2.375

## 2023-11-14 ASSESSMENT — REFRACTION_MANIFEST
OD_AXIS: 050
OS_CYLINDER: -1.00
OS_CYLINDER: -1.50
OS_ADD: +2.75
OD_CYLINDER: -0.75
OD_SPHERE: +3.00
OS_AXIS: 090
OD_VA1: 20/50-2
OD_CYLINDER: -1.25
OD_AXIS: 035
OS_SPHERE: +2.75
OS_VA1: 20/25-2
OS_SPHERE: +2.00
OS_AXIS: 165
OS_SPHERE: +3.25
OS_AXIS: 090
OD_VA1: 20/30
OD_AXIS: 045
OD_SPHERE: +2.50
OD_ADD: +2.75
OD_CYLINDER: -0.75
OD_SPHERE: +2.50
OS_CYLINDER: -1.00
OS_VA1: 20/50-2

## 2023-11-14 ASSESSMENT — CONFRONTATIONAL VISUAL FIELD TEST (CVF)
OD_FINDINGS: FULL
OS_FINDINGS: FULL

## 2023-11-14 ASSESSMENT — SUPERFICIAL PUNCTATE KERATITIS (SPK)
OS_SPK: 1+
OD_SPK: 1+

## 2023-11-14 ASSESSMENT — REFRACTION_AUTOREFRACTION
OD_SPHERE: +3.00
OD_CYLINDER: -1.25
OS_CYLINDER: -1.00
OS_AXIS: 088
OD_AXIS: 050
OS_SPHERE: +2.75

## 2023-11-16 NOTE — PATIENT PROFILE ADULT - FALLEN IN THE PAST
All ROS were negative except burning sensation of skin, headache, constipation, chronic intermittent parasthesia/weakness of left side no

## 2023-12-13 ENCOUNTER — OUTPATIENT (OUTPATIENT)
Dept: OUTPATIENT SERVICES | Facility: HOSPITAL | Age: 83
LOS: 1 days | End: 2023-12-13
Payer: MEDICARE

## 2023-12-13 ENCOUNTER — APPOINTMENT (OUTPATIENT)
Dept: ULTRASOUND IMAGING | Facility: CLINIC | Age: 83
End: 2023-12-13
Payer: MEDICARE

## 2023-12-13 DIAGNOSIS — Z90.710 ACQUIRED ABSENCE OF BOTH CERVIX AND UTERUS: Chronic | ICD-10-CM

## 2023-12-13 DIAGNOSIS — Z00.8 ENCOUNTER FOR OTHER GENERAL EXAMINATION: ICD-10-CM

## 2023-12-13 PROCEDURE — 76536 US EXAM OF HEAD AND NECK: CPT | Mod: 26

## 2023-12-13 PROCEDURE — 76536 US EXAM OF HEAD AND NECK: CPT

## 2023-12-19 ENCOUNTER — OFFICE (OUTPATIENT)
Dept: URBAN - METROPOLITAN AREA CLINIC 12 | Facility: CLINIC | Age: 83
Setting detail: OPHTHALMOLOGY
End: 2023-12-19
Payer: MEDICARE

## 2023-12-19 DIAGNOSIS — H25.13: ICD-10-CM

## 2023-12-19 DIAGNOSIS — H25.11: ICD-10-CM

## 2023-12-19 PROBLEM — H25.12 CATARACT; RIGHT EYE, LEFT EYE, BOTH EYES: Status: ACTIVE | Noted: 2023-12-19

## 2023-12-19 PROCEDURE — 99213 OFFICE O/P EST LOW 20 MIN: CPT | Performed by: OPHTHALMOLOGY

## 2023-12-19 PROCEDURE — 92136 OPHTHALMIC BIOMETRY: CPT | Mod: TC | Performed by: OPHTHALMOLOGY

## 2023-12-19 PROCEDURE — 92136 OPHTHALMIC BIOMETRY: CPT | Mod: 26,RT | Performed by: OPHTHALMOLOGY

## 2023-12-19 ASSESSMENT — REFRACTION_MANIFEST
OD_CYLINDER: -0.75
OD_ADD: +2.75
OD_CYLINDER: -1.25
OS_VA1: 20/50-2
OS_AXIS: 090
OS_CYLINDER: -1.00
OD_AXIS: 045
OS_SPHERE: +2.75
OS_VA1: 20/25-2
OD_CYLINDER: -0.75
OD_SPHERE: +2.50
OD_AXIS: 035
OS_CYLINDER: -1.00
OS_SPHERE: +3.25
OD_SPHERE: +3.00
OS_CYLINDER: -1.50
OD_VA1: 20/30
OS_ADD: +2.75
OD_SPHERE: +2.50
OS_AXIS: 165
OD_VA1: 20/50-2
OD_AXIS: 050
OS_AXIS: 090
OS_SPHERE: +2.00

## 2023-12-19 ASSESSMENT — REFRACTION_CURRENTRX
OS_CYLINDER: -0.50
OD_SPHERE: +1.25
OD_VPRISM_DIRECTION: PROGS
OD_CYLINDER: -0.25
OS_VPRISM_DIRECTION: PROGS
OS_AXIS: 091
OD_AXIS: 010
OD_AXIS: 039
OD_OVR_VA: 20/
OS_OVR_VA: 20/
OD_VPRISM_DIRECTION: PROGS
OS_SPHERE: +1.25
OS_OVR_VA: 20/
OD_ADD: +3.00
OS_VPRISM_DIRECTION: PROGS
OS_ADD: +3.00
OS_ADD: +3.00
OS_SPHERE: +1.25
OS_AXIS: 081
OS_CYLINDER: -0.25
OD_OVR_VA: 20/
OD_ADD: +3.00
OD_SPHERE: +1.25
OD_CYLINDER: -0.25

## 2023-12-19 ASSESSMENT — SUPERFICIAL PUNCTATE KERATITIS (SPK)
OD_SPK: 1+
OS_SPK: 1+

## 2023-12-19 ASSESSMENT — REFRACTION_AUTOREFRACTION
OD_CYLINDER: -0.50
OS_SPHERE: +3.25
OS_CYLINDER: -1.00
OD_AXIS: 074
OS_AXIS: 094
OD_SPHERE: +2.50

## 2023-12-19 ASSESSMENT — SPHEQUIV_DERIVED
OD_SPHEQUIV: 2.375
OS_SPHEQUIV: 1.5
OS_SPHEQUIV: 2.75
OD_SPHEQUIV: 2.25
OS_SPHEQUIV: 2.5
OS_SPHEQUIV: 2.25
OD_SPHEQUIV: 2.125
OD_SPHEQUIV: 2.125

## 2023-12-19 ASSESSMENT — CONFRONTATIONAL VISUAL FIELD TEST (CVF)
OS_FINDINGS: FULL
OD_FINDINGS: FULL

## 2024-01-04 ENCOUNTER — ASC (OUTPATIENT)
Dept: URBAN - METROPOLITAN AREA SURGERY 8 | Facility: SURGERY | Age: 84
Setting detail: OPHTHALMOLOGY
End: 2024-01-04
Payer: MEDICARE

## 2024-01-04 DIAGNOSIS — H25.11: ICD-10-CM

## 2024-01-04 PROCEDURE — 66984 XCAPSL CTRC RMVL W/O ECP: CPT | Mod: RT | Performed by: OPHTHALMOLOGY

## 2024-01-05 ENCOUNTER — OFFICE (OUTPATIENT)
Dept: URBAN - METROPOLITAN AREA CLINIC 12 | Facility: CLINIC | Age: 84
Setting detail: OPHTHALMOLOGY
End: 2024-01-05
Payer: MEDICARE

## 2024-01-05 ENCOUNTER — RX ONLY (RX ONLY)
Age: 84
End: 2024-01-05

## 2024-01-05 DIAGNOSIS — Z96.1: ICD-10-CM

## 2024-01-05 PROCEDURE — 99024 POSTOP FOLLOW-UP VISIT: CPT | Performed by: OPTOMETRIST

## 2024-01-05 ASSESSMENT — REFRACTION_MANIFEST
OS_AXIS: 090
OD_AXIS: 050
OD_SPHERE: +2.50
OD_ADD: +2.75
OD_AXIS: 045
OD_AXIS: 035
OS_VA1: 20/25-2
OS_SPHERE: +2.00
OS_VA1: 20/50-2
OS_CYLINDER: -1.00
OS_AXIS: 090
OD_SPHERE: +2.50
OS_CYLINDER: -1.00
OS_AXIS: 165
OS_CYLINDER: -1.50
OD_CYLINDER: -0.75
OS_SPHERE: +2.75
OS_ADD: +2.75
OD_SPHERE: +3.00
OD_CYLINDER: -0.75
OD_VA1: 20/30
OS_SPHERE: +3.25
OD_CYLINDER: -1.25
OD_VA1: 20/50-2

## 2024-01-05 ASSESSMENT — REFRACTION_CURRENTRX
OD_AXIS: 010
OD_CYLINDER: -0.25
OD_SPHERE: +1.25
OD_SPHERE: +1.25
OS_OVR_VA: 20/
OS_SPHERE: +1.25
OD_ADD: +3.00
OS_AXIS: 091
OS_AXIS: 081
OS_VPRISM_DIRECTION: PROGS
OD_OVR_VA: 20/
OD_ADD: +3.00
OS_ADD: +3.00
OS_VPRISM_DIRECTION: PROGS
OD_AXIS: 039
OS_CYLINDER: -0.50
OS_OVR_VA: 20/
OD_VPRISM_DIRECTION: PROGS
OS_ADD: +3.00
OS_SPHERE: +1.25
OS_CYLINDER: -0.25
OD_VPRISM_DIRECTION: PROGS
OD_CYLINDER: -0.25
OD_OVR_VA: 20/

## 2024-01-05 ASSESSMENT — REFRACTION_AUTOREFRACTION
OD_CYLINDER: -0.75
OS_CYLINDER: -1.75
OS_SPHERE: +3.50
OD_SPHERE: PLANO
OD_AXIS: 142
OS_AXIS: 086

## 2024-01-05 ASSESSMENT — SPHEQUIV_DERIVED
OD_SPHEQUIV: 2.125
OS_SPHEQUIV: 2.625
OD_SPHEQUIV: 2.125
OS_SPHEQUIV: 2.5
OD_SPHEQUIV: 2.375
OS_SPHEQUIV: 2.25
OS_SPHEQUIV: 1.5

## 2024-01-05 ASSESSMENT — CONFRONTATIONAL VISUAL FIELD TEST (CVF)
OS_FINDINGS: FULL
OD_FINDINGS: FULL

## 2024-01-05 ASSESSMENT — SUPERFICIAL PUNCTATE KERATITIS (SPK)
OD_SPK: 1+
OS_SPK: 1+

## 2024-01-09 ENCOUNTER — OFFICE (OUTPATIENT)
Dept: URBAN - METROPOLITAN AREA CLINIC 12 | Facility: CLINIC | Age: 84
Setting detail: OPHTHALMOLOGY
End: 2024-01-09
Payer: MEDICARE

## 2024-01-09 DIAGNOSIS — H25.12: ICD-10-CM

## 2024-01-09 PROCEDURE — 92136 OPHTHALMIC BIOMETRY: CPT | Mod: 26,LT | Performed by: OPHTHALMOLOGY

## 2024-01-09 ASSESSMENT — REFRACTION_CURRENTRX
OD_VPRISM_DIRECTION: PROGS
OS_VPRISM_DIRECTION: PROGS
OS_ADD: +3.00
OD_AXIS: 039
OS_OVR_VA: 20/
OD_VPRISM_DIRECTION: PROGS
OS_CYLINDER: -0.50
OS_VPRISM_DIRECTION: PROGS
OD_OVR_VA: 20/
OS_OVR_VA: 20/
OS_SPHERE: +1.25
OS_AXIS: 091
OD_CYLINDER: -0.25
OS_CYLINDER: -0.25
OS_ADD: +3.00
OD_SPHERE: +1.25
OD_ADD: +3.00
OD_OVR_VA: 20/
OD_ADD: +3.00
OS_AXIS: 081
OD_CYLINDER: -0.25
OS_SPHERE: +1.25
OD_SPHERE: +1.25
OD_AXIS: 010

## 2024-01-09 ASSESSMENT — REFRACTION_AUTOREFRACTION
OS_CYLINDER: -1.00
OS_SPHERE: +3.00
OD_CYLINDER: -0.25
OS_AXIS: 102
OD_SPHERE: PLANO
OD_AXIS: 081

## 2024-01-09 ASSESSMENT — SPHEQUIV_DERIVED
OS_SPHEQUIV: 2.25
OS_SPHEQUIV: 2.5
OS_SPHEQUIV: 1.5
OD_SPHEQUIV: 2.125
OD_SPHEQUIV: 2.375
OS_SPHEQUIV: 2.5
OD_SPHEQUIV: 2.125

## 2024-01-09 ASSESSMENT — REFRACTION_MANIFEST
OD_CYLINDER: -0.75
OS_CYLINDER: -1.00
OS_SPHERE: +2.00
OS_VA1: 20/25-2
OS_CYLINDER: -1.00
OD_CYLINDER: -1.25
OD_CYLINDER: -0.75
OD_SPHERE: +2.50
OS_SPHERE: +3.25
OD_AXIS: 045
OD_AXIS: 050
OD_VA1: 20/30
OS_CYLINDER: -1.50
OS_ADD: +2.75
OS_AXIS: 090
OS_AXIS: 165
OD_SPHERE: +2.50
OS_AXIS: 090
OD_ADD: +2.75
OS_SPHERE: +2.75
OD_SPHERE: +3.00
OD_VA1: 20/50-2
OS_VA1: 20/50-2
OD_AXIS: 035

## 2024-01-09 ASSESSMENT — SUPERFICIAL PUNCTATE KERATITIS (SPK)
OS_SPK: 1+
OD_SPK: 1+

## 2024-01-09 ASSESSMENT — CONFRONTATIONAL VISUAL FIELD TEST (CVF)
OS_FINDINGS: FULL
OD_FINDINGS: FULL

## 2024-01-15 ENCOUNTER — ASC (OUTPATIENT)
Dept: URBAN - METROPOLITAN AREA SURGERY 8 | Facility: SURGERY | Age: 84
Setting detail: OPHTHALMOLOGY
End: 2024-01-15
Payer: MEDICARE

## 2024-01-15 DIAGNOSIS — H25.12: ICD-10-CM

## 2024-01-15 PROCEDURE — 66984 XCAPSL CTRC RMVL W/O ECP: CPT | Mod: 79,LT | Performed by: OPHTHALMOLOGY

## 2024-01-16 ENCOUNTER — OFFICE (OUTPATIENT)
Dept: URBAN - METROPOLITAN AREA CLINIC 12 | Facility: CLINIC | Age: 84
Setting detail: OPHTHALMOLOGY
End: 2024-01-16
Payer: MEDICARE

## 2024-01-16 DIAGNOSIS — Z96.1: ICD-10-CM

## 2024-01-16 PROCEDURE — 99024 POSTOP FOLLOW-UP VISIT: CPT | Performed by: OPTOMETRIST

## 2024-01-16 ASSESSMENT — REFRACTION_CURRENTRX
OS_ADD: +3.00
OS_SPHERE: +1.25
OD_ADD: +3.00
OD_OVR_VA: 20/
OD_SPHERE: +1.25
OS_OVR_VA: 20/
OD_SPHERE: +1.25
OS_OVR_VA: 20/
OD_VPRISM_DIRECTION: PROGS
OD_AXIS: 039
OS_CYLINDER: -0.50
OS_AXIS: 081
OD_OVR_VA: 20/
OD_CYLINDER: -0.25
OS_VPRISM_DIRECTION: PROGS
OS_SPHERE: +1.25
OS_CYLINDER: -0.25
OS_VPRISM_DIRECTION: PROGS
OD_AXIS: 010
OS_ADD: +3.00
OD_CYLINDER: -0.25
OD_ADD: +3.00
OS_AXIS: 091
OD_VPRISM_DIRECTION: PROGS

## 2024-01-16 ASSESSMENT — REFRACTION_AUTOREFRACTION
OS_AXIS: 103
OS_SPHERE: +0.75
OD_AXIS: 113
OS_CYLINDER: -1.00
OD_CYLINDER: -1.00
OD_SPHERE: PLANO

## 2024-01-16 ASSESSMENT — REFRACTION_MANIFEST
OD_ADD: +2.75
OS_AXIS: 090
OD_AXIS: 050
OS_SPHERE: +2.00
OD_SPHERE: +2.50
OD_CYLINDER: -0.75
OD_CYLINDER: -0.75
OS_CYLINDER: -1.00
OD_SPHERE: +3.00
OS_CYLINDER: -1.50
OD_AXIS: 045
OS_VA1: 20/50-2
OD_AXIS: 035
OD_SPHERE: +2.50
OS_SPHERE: +2.75
OS_AXIS: 090
OS_SPHERE: +3.25
OD_VA1: 20/30
OS_AXIS: 165
OD_VA1: 20/50-2
OS_VA1: 20/25-2
OS_CYLINDER: -1.00
OS_ADD: +2.75
OD_CYLINDER: -1.25

## 2024-01-16 ASSESSMENT — SPHEQUIV_DERIVED
OS_SPHEQUIV: 2.25
OD_SPHEQUIV: 2.125
OD_SPHEQUIV: 2.125
OD_SPHEQUIV: 2.375
OS_SPHEQUIV: 0.25
OS_SPHEQUIV: 1.5
OS_SPHEQUIV: 2.5

## 2024-01-16 ASSESSMENT — CONFRONTATIONAL VISUAL FIELD TEST (CVF)
OS_FINDINGS: FULL
OD_FINDINGS: FULL

## 2024-01-16 ASSESSMENT — SUPERFICIAL PUNCTATE KERATITIS (SPK)
OD_SPK: 1+
OS_SPK: 1+

## 2024-01-23 ENCOUNTER — OFFICE (OUTPATIENT)
Dept: URBAN - METROPOLITAN AREA CLINIC 12 | Facility: CLINIC | Age: 84
Setting detail: OPHTHALMOLOGY
End: 2024-01-23
Payer: MEDICARE

## 2024-01-23 DIAGNOSIS — Z96.1: ICD-10-CM

## 2024-01-23 PROCEDURE — 99024 POSTOP FOLLOW-UP VISIT: CPT | Performed by: OPTOMETRIST

## 2024-01-23 ASSESSMENT — REFRACTION_AUTOREFRACTION
OS_SPHERE: PLANO
OS_AXIS: 102
OD_CYLINDER: -1.00
OD_SPHERE: +0.25
OS_CYLINDER: -0.75
OD_AXIS: 102

## 2024-01-23 ASSESSMENT — SPHEQUIV_DERIVED
OS_SPHEQUIV: 2.5
OD_SPHEQUIV: 2.375
OD_SPHEQUIV: -0.25
OS_SPHEQUIV: 2.25
OD_SPHEQUIV: 2.125
OS_SPHEQUIV: 1.5
OD_SPHEQUIV: 2.125

## 2024-01-23 ASSESSMENT — REFRACTION_CURRENTRX
OS_ADD: +3.00
OD_ADD: +3.00
OS_SPHERE: +1.25
OD_CYLINDER: -0.25
OS_AXIS: 091
OD_CYLINDER: -0.25
OS_CYLINDER: -0.25
OS_VPRISM_DIRECTION: PROGS
OD_VPRISM_DIRECTION: PROGS
OD_SPHERE: +1.25
OS_VPRISM_DIRECTION: PROGS
OS_SPHERE: +1.25
OD_VPRISM_DIRECTION: PROGS
OS_OVR_VA: 20/
OD_SPHERE: +1.25
OS_AXIS: 081
OD_AXIS: 010
OD_OVR_VA: 20/
OS_ADD: +3.00
OS_CYLINDER: -0.50
OD_AXIS: 039
OD_ADD: +3.00
OD_OVR_VA: 20/
OS_OVR_VA: 20/

## 2024-01-23 ASSESSMENT — REFRACTION_MANIFEST
OS_VA1: 20/25-2
OD_SPHERE: +2.50
OS_AXIS: 090
OD_CYLINDER: -0.75
OD_VA1: 20/30
OD_AXIS: 035
OD_SPHERE: +3.00
OS_AXIS: 165
OD_VA1: 20/50-2
OD_AXIS: 050
OS_CYLINDER: -1.00
OD_AXIS: 045
OS_AXIS: 090
OD_ADD: +2.75
OS_CYLINDER: -1.50
OS_VA1: 20/50-2
OS_CYLINDER: -1.00
OS_SPHERE: +2.00
OS_SPHERE: +3.25
OD_CYLINDER: -1.25
OD_SPHERE: +2.50
OS_SPHERE: +2.75
OS_ADD: +2.75
OD_CYLINDER: -0.75

## 2024-01-23 ASSESSMENT — CONFRONTATIONAL VISUAL FIELD TEST (CVF)
OD_FINDINGS: FULL
OS_FINDINGS: FULL

## 2024-01-23 ASSESSMENT — SUPERFICIAL PUNCTATE KERATITIS (SPK)
OD_SPK: 1+
OS_SPK: 1+

## 2024-02-16 ENCOUNTER — OFFICE (OUTPATIENT)
Dept: URBAN - METROPOLITAN AREA CLINIC 12 | Facility: CLINIC | Age: 84
Setting detail: OPHTHALMOLOGY
End: 2024-02-16
Payer: MEDICARE

## 2024-02-16 DIAGNOSIS — Z96.1: ICD-10-CM

## 2024-02-16 PROCEDURE — 99024 POSTOP FOLLOW-UP VISIT: CPT | Performed by: OPHTHALMOLOGY

## 2024-02-16 ASSESSMENT — REFRACTION_AUTOREFRACTION
OS_CYLINDER: -1.50
OD_AXIS: 98
OD_CYLINDER: -1.00
OS_AXIS: 95
OS_SPHERE: +0.75
OD_SPHERE: +0.50

## 2024-02-16 ASSESSMENT — SUPERFICIAL PUNCTATE KERATITIS (SPK)
OS_SPK: 1+
OD_SPK: 1+

## 2024-02-16 ASSESSMENT — REFRACTION_MANIFEST
OD_VA1: 20/30
OS_VA1: 20/50-2
OS_AXIS: 165
OD_AXIS: 050
OD_CYLINDER: -1.25
OS_SPHERE: +2.75
OD_ADD: +2.75
OD_AXIS: 045
OS_VA1: 20/25-2
OD_SPHERE: +3.00
OD_SPHERE: +2.50
OS_CYLINDER: -1.00
OD_CYLINDER: -0.75
OS_CYLINDER: -1.00
OS_AXIS: 090
OD_AXIS: 035
OS_SPHERE: +3.25
OD_VA1: 20/50-2
OD_SPHERE: +2.50
OD_CYLINDER: -0.75
OS_SPHERE: +2.00
OS_AXIS: 090
OS_ADD: +2.75
OS_CYLINDER: -1.50

## 2024-02-16 ASSESSMENT — REFRACTION_CURRENTRX
OD_ADD: +3.00
OS_VPRISM_DIRECTION: PROGS
OD_OVR_VA: 20/
OD_OVR_VA: 20/
OD_AXIS: 039
OS_AXIS: 091
OS_SPHERE: +1.25
OD_SPHERE: +1.25
OD_VPRISM_DIRECTION: PROGS
OD_CYLINDER: -0.25
OS_ADD: +3.00
OD_CYLINDER: -0.25
OD_SPHERE: +1.25
OD_AXIS: 010
OS_OVR_VA: 20/
OS_ADD: +3.00
OD_ADD: +3.00
OS_CYLINDER: -0.25
OS_SPHERE: +1.25
OS_CYLINDER: -0.50
OS_AXIS: 081
OS_OVR_VA: 20/
OD_VPRISM_DIRECTION: PROGS
OS_VPRISM_DIRECTION: PROGS

## 2024-02-16 ASSESSMENT — SPHEQUIV_DERIVED
OS_SPHEQUIV: 1.5
OS_SPHEQUIV: 2.5
OD_SPHEQUIV: 2.375
OD_SPHEQUIV: 2.125
OD_SPHEQUIV: 2.125
OS_SPHEQUIV: 2.25
OD_SPHEQUIV: 0
OS_SPHEQUIV: 0

## 2024-02-16 ASSESSMENT — CONFRONTATIONAL VISUAL FIELD TEST (CVF)
OS_FINDINGS: FULL
OD_FINDINGS: FULL

## 2024-04-17 ENCOUNTER — APPOINTMENT (OUTPATIENT)
Dept: PULMONOLOGY | Facility: CLINIC | Age: 84
End: 2024-04-17
Payer: MEDICARE

## 2024-04-17 VITALS
BODY MASS INDEX: 28.34 KG/M2 | HEART RATE: 64 BPM | HEIGHT: 64 IN | RESPIRATION RATE: 16 BRPM | DIASTOLIC BLOOD PRESSURE: 84 MMHG | WEIGHT: 166 LBS | OXYGEN SATURATION: 98 % | SYSTOLIC BLOOD PRESSURE: 136 MMHG | TEMPERATURE: 96.4 F

## 2024-04-17 DIAGNOSIS — R06.02 SHORTNESS OF BREATH: ICD-10-CM

## 2024-04-17 DIAGNOSIS — J45.909 UNSPECIFIED ASTHMA, UNCOMPLICATED: ICD-10-CM

## 2024-04-17 DIAGNOSIS — K21.9 GASTRO-ESOPHAGEAL REFLUX DISEASE W/OUT ESOPHAGITIS: ICD-10-CM

## 2024-04-17 DIAGNOSIS — J30.89 OTHER ALLERGIC RHINITIS: ICD-10-CM

## 2024-04-17 DIAGNOSIS — R93.89 ABNORMAL FINDINGS ON DIAGNOSTIC IMAGING OF OTHER SPECIFIED BODY STRUCTURES: ICD-10-CM

## 2024-04-17 PROCEDURE — 94010 BREATHING CAPACITY TEST: CPT

## 2024-04-17 PROCEDURE — 99214 OFFICE O/P EST MOD 30 MIN: CPT | Mod: 25

## 2024-04-17 RX ORDER — DESLORATADINE 5 MG/1
5 TABLET ORAL
Qty: 90 | Refills: 1 | Status: ACTIVE | COMMUNITY
Start: 2024-04-17 | End: 1900-01-01

## 2024-04-17 NOTE — ADDENDUM
Addended by: Mark Davenport on: 8/16/2019 04:37 PM     Modules accepted: Orders [FreeTextEntry1] : Documented by Brayan Spangler acting as a scribe for Dr. Haroldo Mendoza on 04/17/2024.   All medical record entries made by the Scribe were at my, Dr. Haroldo Mendoza's, direction and personally dictated by me on 04/17/2024. I have reviewed the chart and agree that the record accurately reflects my personal performance of the history, physical exam, assessment and plan. I have also personally directed, reviewed, and agree with the discharge instructions.

## 2024-04-17 NOTE — PROCEDURE
[FreeTextEntry1] : PFT reveals normal flows, with an FEV1 of 1.55 L, which is 81.4% of predicted, with a normal flow volume loop. PFTs were performed to evaluate for SOB  FENO was unable to be performed Fractional exhaled nitric oxide (FENO) is regarded as a simple, noninvasive method for assessing eosinophilic airway inflammation. Produced by a variety of cells within the lung, nitric oxide (NO) concentrations are generally low in healthy individuals. However, high concentrations of NO appear to be involved in nonspecific host defense mechanisms and chronic inflammatory diseases such as asthma. The American Thor[employer] Society (ATS) therefore has strongly recommended using FENO to aid in the assessment, management, and long-term monitoring of eosinophilic airway inflammation and asthma, and for identifying steroid responsive individuals whose chronic respiratory symptoms may be caused by airway inflammation. In their 2011 clinical practice guideline, the ATS emphasizes the importance of using FENO.

## 2024-04-17 NOTE — HISTORY OF PRESENT ILLNESS
[FreeTextEntry1] : Ms. Ponce is a 83 year old female presenting to the office for a follow up visit for allergic rhinitis, asthma, DAVIS, GERD, pulmonary hypertension history, and RLS. Her chief complaint is   - she notes headaches located in the front of her forehead - she notes having a dry mouth  - she notes she is not exercising - vision is being monitored with examination - she notes her energy levels are 5-6 out of 10 - she notes having back pain fir which she is not seeing anyone - she notes getting bloody noses only on one side - she notes no new medications, vitamins, or supplements - she notes seeing her cardiologist last week and had an angiogram performed   -she denies any nausea, emesis, fever, chills, sweats, chest pain, chest pressure, coughing, wheezing, palpitations, constipation, diarrhea, vertigo, dysphagia, heartburn, reflux, itchy eyes, itchy ears, leg swelling, leg pain, myalgias, hoarseness, or sour taste in the mouth.

## 2024-04-17 NOTE — ASSESSMENT
[FreeTextEntry1] : Ms. Ponce is a 83 year old female with a history of asthma, allergies, GERD, ?PAH, and A-fib. 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 - s/p discontinuation of Amiodarone - now stable - COVID 19 1/2021 and 8/2022, stable (resolved) -mild DAVIS (still); NC w/ inhalers; Sinus Issues / Allergy  Her shortness of breath is multifactorial due to: -asthma -poor breathing mechanics -questionable pulmonary arterial hypertension -cardiac disease -overweight  problem 1: asthma - NC -continue Trelegy 100 1 inhalation QD -continue Singulair 10 mg before bed -continue Xopenex (0.63) TID by nebulizer, PRN (gargle and rinse after use) -Asthma is believed to be caused by inherited (genetic) and environmental factor, but its exact cause is unknown. Asthma may be triggered by allergens, lung infections, or irritants in the air. Asthma triggers are different for each person -Inhaler technique reviewed as well as oral hygiene techniques reviewed with patient. Avoidance of cold air, extremes of temperature, rescue inhaler should be used before exercise. Order of medication reviewed with patient. Recommended use of a cool mist humidifier in the bedroom.  Problem 1A: s/p COVID 19 infection (1/2021, 8/2022) -s/p Covid 19 vaccine x2 -educated patient on COVID 19 vaccine and appropriate recommendations  COVID-19 precautionary Immune Support Recommendations: -OTC Vitamin C 1000mg BID -OTC Quercetin 500mg BID -OTC Zinc 50 mg per day -OTC Melatonin 5 mg a night -OTC Vitamin D 2000mg per day -OTC Tonic Water 8oz per day -Water 0.5-1 gallon per day  Additional Support Supplements: -Liposomal Glutathione 500 mg BID -SPM 1 tablet BID -Berberine 1000 mg BID  problem 2: allergy/ post nasal drip syndrome - Active -get blood work to include: asthma panel, food IgE panel, IgE level, eosinophil level, vitamin D level  - continue Flonase 1 sniff/nostril BID -followed by Valarie (.15) 1 sniff/nostril BID -add Clarinex 5 mg QAM -Environmental measures for allergies were encouraged including mattress and pillow cover, air purifier, and environmental controls.  problem 3: cardiac disease/ A-fib (off Amiodarone) -contemplating cardiac ablation -continue to follow up with Dr. Tabares -s/p card cath  Problem 3A: s/p Amiodarone / effusions - abnormal CT (yearly) -s/p nc chest CT - 6/2020 HRCT prone and supine - 8/2022, 8/2023 - next 10/2024 -The fluid between the lung and chest wall and it is of concern. A formal/definitive evaluation by a thoracic surgeon; a thoracentesis is only a temporizing situation- definitive therapy is needed. - Amiodarone/bleomycin/methotrexate and other drugs have been associated with pulmonary parenchymal damage. These drugs require pulmonary function testing including DLCO regularly and possible CT/CXR if respiratory complaints develop. PFTs should be performed at 6 month intervals.  problem 4: poor breathing mechanics -Proper breathing techniques were reviewed with an emphasis of exhalation. Patient instructed to breath in for 1 second and out for four seconds. Patient was encouraged to not talk while walking.  problem 5: GERD -Protonix 40 mg before breakfast -continue to use Carafate 1 g before each meal, PRN -Things to avoid including overeating, spicy foods, tight clothing, eating within three hours of bed, this list is not all inclusive. -For treatment of reflux, possible options discussed including diet control, H2 blockers, PPIs, as well as coating motility agents discussed as treatment options. Timing of meals and proximity of last meal to sleep were discussed. If symptoms persist, a formal gastrointestinal evaluation is needed.  problem 6: low vitamin d -continue to use low vitamin d therapy -Has been associated with asthma exacerbations and increased allergic symptoms. The goal based on recent information is maintaining levels between 50-70 and low normal is 30. Recommended 50,000 units every two weeks to once a month depending on the level.  problem 7: primary snoring/poor sleep (?RLS) -recommended to try Sleep Guard by [employer] -continue Requip 0.5 mg QHS -recommended to use Oxy-Aid by [employer] -sleep study was not consistent with sleep apnea  problem 8: leg cramping / back pain- improved -continue Tizanidine 2 mg BID up to TiD -recommended to try Myocalm PM - Recommended Joint Guard - Recommended Pain Guard  Problem 9: Health Maintenance COVID 19 -s/p Covid 19 vaccine x2 (no booster needed 9/2022) - Clean your hands often. Wash your hands often with soap and water for at least 20 seconds, especially after blowing your nose, coughing, or sneezing, or having been in a public place. - If soap and water are not available, use a hand  that contains at least 60% alcohol. - To the extent possible, avoid touching high-touch surfaces in public places - elevator buttons, door handles, handrails, handshaking with people, etc. Use a tissue or your sleeve to cover your hand or finger if you must touch something. - Wash your hands after touching surfaces in public places. - Avoid touching your face, nose, eyes, etc. - Clean and disinfect your home to remove germs: practice routine cleaning of frequently touched surfaces (for example: tables, doorknobs, light switches, handles, desks, toilets, faucets, sinks & cell phones) - Avoid crowds, especially in poorly ventilated spaces. Your risk of exposure to respiratory viruses like COVID-19 may increase in crowded, closed-in settings with little air circulation if there are people in the crowd who are sick. All patients are recommended to practice social distancing and stay at least 6 feet away from others. - Avoid all non-essential travel including plane trips, and especially avoid embarking on cruise ships. -If COVID-19 is spreading in your community, take extra measures to put distance between yourself and other people to further reduce your risk of being exposed to this new virus. -Stay home as much as possible. - Consider ways of getting food brought to your house through family, social, or commercial networks -Be aware that the virus has been known to live in the air up to 3 hours post exposure, cardboard up to 24 hours post exposure, copper up to 4 hours post exposure, steel and plastic up to 2-3 days post exposure. Risk of transmission from these surfaces are affected by many variables.  Immune Support Recommendations: -OTC Vitamin C 500mg BID -OTC Quercetin 250-500mg BID -OTC Zinc 75-100mg per day -OTC Melatonin 1 or 2 mg a night -OTC Vitamin D 1-4000mg per day -OTC Tonic Water 8oz per day  Asthma and COVID19: You need to make sure your asthma is under control. This often requires the use of inhaled corticosteroids (and sometimes oral corticosteroids). Inhaled corticosteroids do not likely reduce your immune system's ability to fight infections, but oral corticosteroids may. It is important to use the steps above to protect yourself to limit your exposure to any respiratory virus.  problem 10: health maintenance -recommended RSV vaccine in the Fall for anyone over the age of 60 - s/p 2022 flu shot -strep pneumonia vaccines: Prevnar-13 vaccine, followed by Pneumo vaccine 23 one year following (completed) -early intervention for URIs -regular osteoporosis evaluations -early dermatological evaluations -after the age of 50 to the age of 70, colonoscopy every 5 years   F/U in 4 months She is encouraged to call with any changes, concerns, or questions.

## 2024-05-16 ENCOUNTER — OFFICE (OUTPATIENT)
Dept: URBAN - METROPOLITAN AREA CLINIC 12 | Facility: CLINIC | Age: 84
Setting detail: OPHTHALMOLOGY
End: 2024-05-16
Payer: MEDICARE

## 2024-05-16 DIAGNOSIS — H16.223: ICD-10-CM

## 2024-05-16 DIAGNOSIS — H43.393: ICD-10-CM

## 2024-05-16 DIAGNOSIS — H26.493: ICD-10-CM

## 2024-05-16 DIAGNOSIS — H35.363: ICD-10-CM

## 2024-05-16 DIAGNOSIS — H40.1132: ICD-10-CM

## 2024-05-16 DIAGNOSIS — Z96.1: ICD-10-CM

## 2024-05-16 DIAGNOSIS — H35.373: ICD-10-CM

## 2024-05-16 PROBLEM — H52.7 REFRACTIVE ERROR: Status: ACTIVE | Noted: 2024-05-16

## 2024-05-16 PROCEDURE — 92083 EXTENDED VISUAL FIELD XM: CPT | Performed by: OPHTHALMOLOGY

## 2024-05-16 PROCEDURE — 92133 CPTRZD OPH DX IMG PST SGM ON: CPT | Performed by: OPHTHALMOLOGY

## 2024-05-16 PROCEDURE — 92014 COMPRE OPH EXAM EST PT 1/>: CPT | Performed by: OPHTHALMOLOGY

## 2024-05-16 ASSESSMENT — CONFRONTATIONAL VISUAL FIELD TEST (CVF)
OD_FINDINGS: FULL
OS_FINDINGS: FULL

## 2024-10-04 ENCOUNTER — OUTPATIENT (OUTPATIENT)
Dept: OUTPATIENT SERVICES | Facility: HOSPITAL | Age: 84
LOS: 1 days | End: 2024-10-04
Payer: MEDICARE

## 2024-10-04 ENCOUNTER — APPOINTMENT (OUTPATIENT)
Dept: RADIOLOGY | Facility: CLINIC | Age: 84
End: 2024-10-04

## 2024-10-04 DIAGNOSIS — Z90.710 ACQUIRED ABSENCE OF BOTH CERVIX AND UTERUS: Chronic | ICD-10-CM

## 2024-10-04 DIAGNOSIS — Z00.00 ENCOUNTER FOR GENERAL ADULT MEDICAL EXAMINATION WITHOUT ABNORMAL FINDINGS: ICD-10-CM

## 2024-10-04 PROCEDURE — 77080 DXA BONE DENSITY AXIAL: CPT | Mod: 26

## 2024-10-24 ENCOUNTER — APPOINTMENT (OUTPATIENT)
Dept: PULMONOLOGY | Facility: CLINIC | Age: 84
End: 2024-10-24
Payer: MEDICARE

## 2024-10-24 VITALS
OXYGEN SATURATION: 97 % | DIASTOLIC BLOOD PRESSURE: 80 MMHG | WEIGHT: 165 LBS | RESPIRATION RATE: 16 BRPM | HEIGHT: 64 IN | BODY MASS INDEX: 28.17 KG/M2 | HEART RATE: 72 BPM | SYSTOLIC BLOOD PRESSURE: 130 MMHG

## 2024-10-24 DIAGNOSIS — J45.909 UNSPECIFIED ASTHMA, UNCOMPLICATED: ICD-10-CM

## 2024-10-24 DIAGNOSIS — R93.89 ABNORMAL FINDINGS ON DIAGNOSTIC IMAGING OF OTHER SPECIFIED BODY STRUCTURES: ICD-10-CM

## 2024-10-24 DIAGNOSIS — J30.89 OTHER ALLERGIC RHINITIS: ICD-10-CM

## 2024-10-24 DIAGNOSIS — R06.02 SHORTNESS OF BREATH: ICD-10-CM

## 2024-10-24 DIAGNOSIS — Z86.79 PERSONAL HISTORY OF OTHER DISEASES OF THE CIRCULATORY SYSTEM: ICD-10-CM

## 2024-10-24 DIAGNOSIS — K21.9 GASTRO-ESOPHAGEAL REFLUX DISEASE W/OUT ESOPHAGITIS: ICD-10-CM

## 2024-10-24 PROCEDURE — 94727 GAS DIL/WSHOT DETER LNG VOL: CPT

## 2024-10-24 PROCEDURE — 99214 OFFICE O/P EST MOD 30 MIN: CPT | Mod: 25

## 2024-10-24 PROCEDURE — 94010 BREATHING CAPACITY TEST: CPT

## 2024-10-24 PROCEDURE — 95012 NITRIC OXIDE EXP GAS DETER: CPT

## 2024-10-24 PROCEDURE — 94729 DIFFUSING CAPACITY: CPT

## 2024-10-24 PROCEDURE — ZZZZZ: CPT

## 2024-10-24 PROCEDURE — 94618 PULMONARY STRESS TESTING: CPT

## 2024-11-15 ENCOUNTER — OFFICE (OUTPATIENT)
Dept: URBAN - METROPOLITAN AREA CLINIC 12 | Facility: CLINIC | Age: 84
Setting detail: OPHTHALMOLOGY
End: 2024-11-15
Payer: MEDICARE

## 2024-11-15 ENCOUNTER — RX ONLY (RX ONLY)
Age: 84
End: 2024-11-15

## 2024-11-15 DIAGNOSIS — H26.493: ICD-10-CM

## 2024-11-15 DIAGNOSIS — H35.373: ICD-10-CM

## 2024-11-15 DIAGNOSIS — H35.363: ICD-10-CM

## 2024-11-15 DIAGNOSIS — H16.223: ICD-10-CM

## 2024-11-15 DIAGNOSIS — H40.1132: ICD-10-CM

## 2024-11-15 DIAGNOSIS — H43.393: ICD-10-CM

## 2024-11-15 DIAGNOSIS — Z96.1: ICD-10-CM

## 2024-11-15 PROCEDURE — 92250 FUNDUS PHOTOGRAPHY W/I&R: CPT | Performed by: OPHTHALMOLOGY

## 2024-11-15 PROCEDURE — 92012 INTRM OPH EXAM EST PATIENT: CPT | Performed by: OPHTHALMOLOGY

## 2024-11-15 ASSESSMENT — KERATOMETRY
OD_AXISANGLE_DEGREES: 106
METHOD_AUTO_MANUAL: AUTO
OS_K2POWER_DIOPTERS: 45.50
OD_K2POWER_DIOPTERS: 46.75
OD_K1POWER_DIOPTERS: 46.00
OS_K1POWER_DIOPTERS: 44.50
OS_AXISANGLE_DEGREES: 011

## 2024-11-15 ASSESSMENT — TONOMETRY
OS_IOP_MMHG: 13
OD_IOP_MMHG: 11

## 2024-11-15 ASSESSMENT — PACHYMETRY
OD_CT_UM: 578
OS_CT_UM: 553
OS_CT_CORRECTION: -1
OD_CT_CORRECTION: -2

## 2024-11-15 ASSESSMENT — CONFRONTATIONAL VISUAL FIELD TEST (CVF)
OS_FINDINGS: FULL
OD_FINDINGS: FULL

## 2024-11-15 ASSESSMENT — REFRACTION_MANIFEST
OS_AXIS: 100
OS_SPHERE: +1.50
OD_ADD: +2.75
OD_CYLINDER: -0.75
OS_CYLINDER: -1.00
OD_CYLINDER: -1.25
OS_VA1: 20/40-2
OD_CYLINDER: -0.75
OS_AXIS: 090
OD_VA1: 20/30
OS_AXIS: 090
OD_SPHERE: +2.50
OD_VA1: 20/30-2
OS_VA1: 20/25-2
OD_AXIS: 140
OD_AXIS: 050
OS_SPHERE: +3.25
OD_SPHERE: +3.00
OS_CYLINDER: -1.50
OS_ADD: +2.75
OD_SPHERE: PLANO
OS_SPHERE: +2.75
OD_AXIS: 035
OS_CYLINDER: -0.50

## 2024-11-15 ASSESSMENT — REFRACTION_AUTOREFRACTION
OS_SPHERE: +1.50
OD_CYLINDER: -0.75
OD_AXIS: 141
OD_SPHERE: PLANO
OS_CYLINDER: -0.50
OS_AXIS: 102

## 2024-11-15 ASSESSMENT — REFRACTION_CURRENTRX
OD_CYLINDER: -0.25
OS_OVR_VA: 20/
OD_VPRISM_DIRECTION: PROGS
OD_VPRISM_DIRECTION: PROGS
OD_ADD: +3.00
OD_SPHERE: +1.25
OS_OVR_VA: 20/
OS_SPHERE: +1.25
OS_AXIS: 091
OD_OVR_VA: 20/
OS_ADD: +3.00
OD_AXIS: 010
OS_SPHERE: +1.25
OS_CYLINDER: -0.25
OD_SPHERE: +1.25
OS_VPRISM_DIRECTION: PROGS
OD_ADD: +3.00
OS_CYLINDER: -0.50
OD_AXIS: 039
OD_CYLINDER: -0.25
OS_AXIS: 081
OS_VPRISM_DIRECTION: PROGS
OS_ADD: +3.00
OD_OVR_VA: 20/

## 2024-11-15 ASSESSMENT — SUPERFICIAL PUNCTATE KERATITIS (SPK)
OS_SPK: 1+
OD_SPK: 1+

## 2024-11-15 ASSESSMENT — VISUAL ACUITY
OS_BCVA: 20/40-2
OD_BCVA: 20/40

## 2024-11-20 PROCEDURE — 77080 DXA BONE DENSITY AXIAL: CPT

## 2024-12-09 ENCOUNTER — OFFICE (OUTPATIENT)
Dept: URBAN - METROPOLITAN AREA CLINIC 12 | Facility: CLINIC | Age: 84
Setting detail: OPHTHALMOLOGY
End: 2024-12-09
Payer: MEDICARE

## 2024-12-09 DIAGNOSIS — H52.7: ICD-10-CM

## 2024-12-09 PROCEDURE — RX/CHECK RX/CHECK: Performed by: OPTOMETRIST

## 2024-12-09 ASSESSMENT — REFRACTION_MANIFEST
OD_VA1: 20/30
OD_ADD: +2.75
OS_ADD: +2.75
OD_AXIS: 035
OD_VA1: 20/30
OD_SPHERE: PLANO
OD_CYLINDER: -1.25
OS_SPHERE: +2.75
OS_VA1: 20/25-2
OD_CYLINDER: -0.75
OS_ADD: +2.75
OS_CYLINDER: -1.50
OS_AXIS: 100
OD_SPHERE: +2.50
OS_CYLINDER: -0.50
OD_ADD: +2.75
OD_SPHERE: +3.00
OD_VA1: 20/30-2
OS_SPHERE: +1.50
OS_AXIS: 100
OD_SPHERE: PLANO
OS_VA1: 20/40-2
OS_AXIS: 090
OD_AXIS: 050
OD_AXIS: 105
OD_AXIS: 140
OS_CYLINDER: -1.25
OS_AXIS: 090
OS_VA1: 20/30
OD_CYLINDER: -0.75
OS_SPHERE: +3.25
OS_CYLINDER: -1.00
OD_CYLINDER: -0.75
OS_SPHERE: PLANO

## 2024-12-09 ASSESSMENT — REFRACTION_CURRENTRX
OD_SPHERE: +1.25
OD_CYLINDER: -0.25
OD_OVR_VA: 20/
OD_VPRISM_DIRECTION: PROGS
OS_CYLINDER: -0.25
OD_AXIS: 039
OS_OVR_VA: 20/
OS_ADD: +3.00
OS_AXIS: 091
OD_ADD: +3.00
OS_ADD: +3.00
OS_VPRISM_DIRECTION: PROGS
OS_VPRISM_DIRECTION: PROGS
OS_OVR_VA: 20/
OD_OVR_VA: 20/
OD_SPHERE: +1.25
OD_AXIS: 010
OD_ADD: +3.00
OD_CYLINDER: -0.25
OS_AXIS: 081
OS_SPHERE: +1.25
OD_VPRISM_DIRECTION: PROGS
OS_CYLINDER: -0.50
OS_SPHERE: +1.25

## 2024-12-09 ASSESSMENT — KERATOMETRY
OS_AXISANGLE_DEGREES: 178
OD_K1POWER_DIOPTERS: 45.00
METHOD_AUTO_MANUAL: AUTO
OS_K1POWER_DIOPTERS: 45.00
OD_K2POWER_DIOPTERS: 45.50
OS_K2POWER_DIOPTERS: 46.00
OD_AXISANGLE_DEGREES: 158

## 2024-12-09 ASSESSMENT — VISUAL ACUITY
OD_BCVA: 20/40-2
OS_BCVA: 20/50

## 2024-12-09 ASSESSMENT — REFRACTION_AUTOREFRACTION
OS_SPHERE: +1.00
OD_SPHERE: +0.75
OD_CYLINDER: -1.00
OS_CYLINDER: -1.50
OD_AXIS: 105
OS_AXIS: 100

## 2024-12-09 ASSESSMENT — SUPERFICIAL PUNCTATE KERATITIS (SPK)
OD_SPK: 1+
OS_SPK: 1+

## 2024-12-09 ASSESSMENT — PACHYMETRY
OS_CT_CORRECTION: -1
OS_CT_UM: 553
OD_CT_CORRECTION: -2
OD_CT_UM: 578

## 2024-12-09 ASSESSMENT — TONOMETRY
OS_IOP_MMHG: 19
OD_IOP_MMHG: 14

## 2024-12-09 ASSESSMENT — CONFRONTATIONAL VISUAL FIELD TEST (CVF)
OS_FINDINGS: FULL
OD_FINDINGS: FULL

## 2025-02-20 ENCOUNTER — APPOINTMENT (OUTPATIENT)
Dept: PULMONOLOGY | Facility: CLINIC | Age: 85
End: 2025-02-20
Payer: MEDICARE

## 2025-02-20 VITALS
TEMPERATURE: 97.7 F | HEART RATE: 60 BPM | SYSTOLIC BLOOD PRESSURE: 140 MMHG | BODY MASS INDEX: 28.88 KG/M2 | HEIGHT: 63 IN | WEIGHT: 163 LBS | RESPIRATION RATE: 16 BRPM | DIASTOLIC BLOOD PRESSURE: 74 MMHG | OXYGEN SATURATION: 97 %

## 2025-02-20 DIAGNOSIS — J30.89 OTHER ALLERGIC RHINITIS: ICD-10-CM

## 2025-02-20 DIAGNOSIS — Z79.899 OTHER LONG TERM (CURRENT) DRUG THERAPY: ICD-10-CM

## 2025-02-20 DIAGNOSIS — K21.9 GASTRO-ESOPHAGEAL REFLUX DISEASE W/OUT ESOPHAGITIS: ICD-10-CM

## 2025-02-20 DIAGNOSIS — J45.909 UNSPECIFIED ASTHMA, UNCOMPLICATED: ICD-10-CM

## 2025-02-20 DIAGNOSIS — R93.89 ABNORMAL FINDINGS ON DIAGNOSTIC IMAGING OF OTHER SPECIFIED BODY STRUCTURES: ICD-10-CM

## 2025-02-20 DIAGNOSIS — R06.02 SHORTNESS OF BREATH: ICD-10-CM

## 2025-02-20 PROCEDURE — 99214 OFFICE O/P EST MOD 30 MIN: CPT | Mod: 25

## 2025-02-20 PROCEDURE — ZZZZZ: CPT

## 2025-02-20 PROCEDURE — 95012 NITRIC OXIDE EXP GAS DETER: CPT

## 2025-02-20 PROCEDURE — 94010 BREATHING CAPACITY TEST: CPT

## 2025-02-20 PROCEDURE — 94729 DIFFUSING CAPACITY: CPT

## 2025-02-20 PROCEDURE — 94727 GAS DIL/WSHOT DETER LNG VOL: CPT

## 2025-06-05 ENCOUNTER — OFFICE (OUTPATIENT)
Dept: URBAN - METROPOLITAN AREA CLINIC 12 | Facility: CLINIC | Age: 85
Setting detail: OPHTHALMOLOGY
End: 2025-06-05
Payer: MEDICARE

## 2025-06-05 DIAGNOSIS — H43.393: ICD-10-CM

## 2025-06-05 DIAGNOSIS — H16.223: ICD-10-CM

## 2025-06-05 DIAGNOSIS — H35.363: ICD-10-CM

## 2025-06-05 DIAGNOSIS — H40.1132: ICD-10-CM

## 2025-06-05 PROCEDURE — 92014 COMPRE OPH EXAM EST PT 1/>: CPT | Performed by: OPHTHALMOLOGY

## 2025-06-05 PROCEDURE — 92133 CPTRZD OPH DX IMG PST SGM ON: CPT | Performed by: OPHTHALMOLOGY

## 2025-06-05 PROCEDURE — 92083 EXTENDED VISUAL FIELD XM: CPT | Performed by: OPHTHALMOLOGY

## 2025-06-05 ASSESSMENT — REFRACTION_CURRENTRX
OD_CYLINDER: -0.25
OD_AXIS: 039
OS_CYLINDER: -0.25
OS_AXIS: 081
OS_OVR_VA: 20/
OS_CYLINDER: -0.50
OD_ADD: +3.00
OD_CYLINDER: -0.25
OD_OVR_VA: 20/
OS_ADD: +3.00
OD_VPRISM_DIRECTION: PROGS
OS_SPHERE: +1.25
OD_ADD: +3.00
OD_SPHERE: +1.25
OD_VPRISM_DIRECTION: PROGS
OS_VPRISM_DIRECTION: PROGS
OS_SPHERE: +1.25
OS_OVR_VA: 20/
OS_VPRISM_DIRECTION: PROGS
OD_OVR_VA: 20/
OS_AXIS: 091
OD_AXIS: 010
OS_ADD: +3.00
OD_SPHERE: +1.25

## 2025-06-05 ASSESSMENT — REFRACTION_MANIFEST
OS_ADD: +2.75
OD_ADD: +2.75
OS_ADD: +2.75
OD_SPHERE: +3.00
OS_VA1: 20/30
OS_SPHERE: PLANO
OD_AXIS: 140
OS_SPHERE: +1.50
OS_SPHERE: +3.25
OD_AXIS: 120
OS_AXIS: 090
OS_CYLINDER: -1.00
OS_VA1: 20/25-2
OD_CYLINDER: -0.75
OD_VA1: 20/30
OD_SPHERE: PLANO
OS_CYLINDER: -1.25
OS_CYLINDER: -0.50
OD_CYLINDER: -0.75
OD_AXIS: 050
OD_SPHERE: PLANO
OD_ADD: +2.75
OD_CYLINDER: -0.75
OS_CYLINDER: -1.25
OD_CYLINDER: -1.00
OS_SPHERE: +2.75
OD_AXIS: 035
OD_AXIS: 105
OD_SPHERE: +0.75
OD_VA1: 20/30-2
OD_SPHERE: +2.50
OD_VA1: 20/30
OS_AXIS: 090
OS_AXIS: 100
OS_VA1: 20/30
OD_VA1: 20/30-
OS_SPHERE: +1.00
OS_CYLINDER: -1.50
OD_CYLINDER: -1.25
OS_VA1: 20/40-2
OS_AXIS: 100
OS_AXIS: 100

## 2025-06-05 ASSESSMENT — PACHYMETRY
OD_CT_UM: 578
OD_CT_CORRECTION: -2
OS_CT_CORRECTION: -1
OS_CT_UM: 553

## 2025-06-05 ASSESSMENT — REFRACTION_AUTOREFRACTION
OS_AXIS: 099
OD_CYLINDER: -1.00
OD_AXIS: 122
OS_CYLINDER: -1.25
OS_SPHERE: +1.00
OD_SPHERE: +0.75

## 2025-06-05 ASSESSMENT — KERATOMETRY
OD_AXISANGLE_DEGREES: 007
OD_K2POWER_DIOPTERS: 45.00
OS_AXISANGLE_DEGREES: 045
METHOD_AUTO_MANUAL: AUTO
OS_K1POWER_DIOPTERS: 44.25
OD_K1POWER_DIOPTERS: 44.75
OS_K2POWER_DIOPTERS: 45.25

## 2025-06-05 ASSESSMENT — SUPERFICIAL PUNCTATE KERATITIS (SPK)
OS_SPK: 1+
OD_SPK: 1+

## 2025-06-05 ASSESSMENT — TONOMETRY
OD_IOP_MMHG: 13
OS_IOP_MMHG: 12

## 2025-06-05 ASSESSMENT — CONFRONTATIONAL VISUAL FIELD TEST (CVF)
OS_FINDINGS: FULL
OD_FINDINGS: FULL

## 2025-06-05 ASSESSMENT — VISUAL ACUITY
OD_BCVA: 20/40
OS_BCVA: 20/50+2

## 2025-06-12 ENCOUNTER — APPOINTMENT (OUTPATIENT)
Dept: PULMONOLOGY | Facility: CLINIC | Age: 85
End: 2025-06-12
Payer: MEDICARE

## 2025-06-12 VITALS
OXYGEN SATURATION: 97 % | DIASTOLIC BLOOD PRESSURE: 72 MMHG | SYSTOLIC BLOOD PRESSURE: 128 MMHG | HEART RATE: 63 BPM | BODY MASS INDEX: 27.64 KG/M2 | TEMPERATURE: 97.2 F | HEIGHT: 63 IN | WEIGHT: 156 LBS | RESPIRATION RATE: 16 BRPM

## 2025-06-12 PROCEDURE — 94727 GAS DIL/WSHOT DETER LNG VOL: CPT

## 2025-06-12 PROCEDURE — 95012 NITRIC OXIDE EXP GAS DETER: CPT

## 2025-06-12 PROCEDURE — 94010 BREATHING CAPACITY TEST: CPT

## 2025-06-12 PROCEDURE — 94729 DIFFUSING CAPACITY: CPT

## 2025-06-12 PROCEDURE — 99214 OFFICE O/P EST MOD 30 MIN: CPT | Mod: 25

## 2025-06-12 PROCEDURE — ZZZZZ: CPT

## 2025-08-08 ENCOUNTER — OUTPATIENT (OUTPATIENT)
Dept: OUTPATIENT SERVICES | Facility: HOSPITAL | Age: 85
LOS: 1 days | End: 2025-08-08
Payer: MEDICARE

## 2025-08-08 ENCOUNTER — APPOINTMENT (OUTPATIENT)
Dept: ULTRASOUND IMAGING | Facility: CLINIC | Age: 85
End: 2025-08-08
Payer: MEDICARE

## 2025-08-08 DIAGNOSIS — Z90.710 ACQUIRED ABSENCE OF BOTH CERVIX AND UTERUS: Chronic | ICD-10-CM

## 2025-08-08 DIAGNOSIS — K75.0 ABSCESS OF LIVER: ICD-10-CM

## 2025-08-08 PROCEDURE — 76705 ECHO EXAM OF ABDOMEN: CPT

## 2025-08-08 PROCEDURE — 76705 ECHO EXAM OF ABDOMEN: CPT | Mod: 26

## 2025-08-22 ENCOUNTER — APPOINTMENT (OUTPATIENT)
Dept: ULTRASOUND IMAGING | Facility: CLINIC | Age: 85
End: 2025-08-22
Payer: MEDICARE

## 2025-08-22 ENCOUNTER — OUTPATIENT (OUTPATIENT)
Dept: OUTPATIENT SERVICES | Facility: HOSPITAL | Age: 85
LOS: 1 days | End: 2025-08-22
Payer: MEDICARE

## 2025-08-22 DIAGNOSIS — Z90.710 ACQUIRED ABSENCE OF BOTH CERVIX AND UTERUS: Chronic | ICD-10-CM

## 2025-08-22 DIAGNOSIS — Z00.8 ENCOUNTER FOR OTHER GENERAL EXAMINATION: ICD-10-CM

## 2025-08-22 PROCEDURE — 76705 ECHO EXAM OF ABDOMEN: CPT | Mod: 26

## 2025-08-22 PROCEDURE — 76705 ECHO EXAM OF ABDOMEN: CPT

## 2025-08-30 ENCOUNTER — APPOINTMENT (OUTPATIENT)
Dept: ULTRASOUND IMAGING | Facility: CLINIC | Age: 85
End: 2025-08-30

## 2025-08-31 ENCOUNTER — INPATIENT (INPATIENT)
Facility: HOSPITAL | Age: 85
LOS: 2 days | Discharge: HOME HEALTH | DRG: 443 | End: 2025-09-03
Attending: INTERNAL MEDICINE | Admitting: INTERNAL MEDICINE
Payer: MEDICARE

## 2025-08-31 VITALS — HEIGHT: 63 IN

## 2025-08-31 DIAGNOSIS — K75.0 ABSCESS OF LIVER: ICD-10-CM

## 2025-08-31 DIAGNOSIS — Z90.710 ACQUIRED ABSENCE OF BOTH CERVIX AND UTERUS: Chronic | ICD-10-CM

## 2025-08-31 LAB
ALBUMIN SERPL ELPH-MCNC: 3.2 G/DL — LOW (ref 3.3–5)
ALP SERPL-CCNC: 241 U/L — HIGH (ref 40–120)
ALT FLD-CCNC: 333 U/L — HIGH (ref 12–78)
ANION GAP SERPL CALC-SCNC: 7 MMOL/L — SIGNIFICANT CHANGE UP (ref 5–17)
APPEARANCE UR: CLEAR — SIGNIFICANT CHANGE UP
APTT BLD: 34.5 SEC — SIGNIFICANT CHANGE UP (ref 26.1–36.8)
AST SERPL-CCNC: 248 U/L — HIGH (ref 15–37)
BACTERIA # UR AUTO: NEGATIVE /HPF — SIGNIFICANT CHANGE UP
BASOPHILS # BLD AUTO: 0.04 K/UL — SIGNIFICANT CHANGE UP (ref 0–0.2)
BASOPHILS NFR BLD AUTO: 1.1 % — SIGNIFICANT CHANGE UP (ref 0–2)
BILIRUB SERPL-MCNC: 0.8 MG/DL — SIGNIFICANT CHANGE UP (ref 0.2–1.2)
BILIRUB UR-MCNC: NEGATIVE — SIGNIFICANT CHANGE UP
BUN SERPL-MCNC: 14 MG/DL — SIGNIFICANT CHANGE UP (ref 7–23)
CALCIUM SERPL-MCNC: 9.7 MG/DL — SIGNIFICANT CHANGE UP (ref 8.5–10.1)
CHLORIDE SERPL-SCNC: 105 MMOL/L — SIGNIFICANT CHANGE UP (ref 96–108)
CO2 SERPL-SCNC: 23 MMOL/L — SIGNIFICANT CHANGE UP (ref 22–31)
COLOR SPEC: YELLOW — SIGNIFICANT CHANGE UP
CREAT SERPL-MCNC: 0.78 MG/DL — SIGNIFICANT CHANGE UP (ref 0.5–1.3)
DIFF PNL FLD: ABNORMAL
EGFR: 75 ML/MIN/1.73M2 — SIGNIFICANT CHANGE UP
EGFR: 75 ML/MIN/1.73M2 — SIGNIFICANT CHANGE UP
EOSINOPHIL # BLD AUTO: 0.02 K/UL — SIGNIFICANT CHANGE UP (ref 0–0.5)
EOSINOPHIL NFR BLD AUTO: 0.5 % — SIGNIFICANT CHANGE UP (ref 0–6)
FLUAV AG NPH QL: SIGNIFICANT CHANGE UP
FLUBV AG NPH QL: SIGNIFICANT CHANGE UP
GLUCOSE SERPL-MCNC: 110 MG/DL — HIGH (ref 70–99)
GLUCOSE UR QL: NEGATIVE MG/DL — SIGNIFICANT CHANGE UP
HCT VFR BLD CALC: 36.8 % — SIGNIFICANT CHANGE UP (ref 34.5–45)
HGB BLD-MCNC: 12 G/DL — SIGNIFICANT CHANGE UP (ref 11.5–15.5)
IMM GRANULOCYTES # BLD AUTO: 0.02 K/UL — SIGNIFICANT CHANGE UP (ref 0–0.07)
IMM GRANULOCYTES NFR BLD AUTO: 0.5 % — SIGNIFICANT CHANGE UP (ref 0–0.9)
INR BLD: 1.31 RATIO — HIGH (ref 0.85–1.16)
KETONES UR QL: NEGATIVE MG/DL — SIGNIFICANT CHANGE UP
LACTATE SERPL-SCNC: 0.8 MMOL/L — SIGNIFICANT CHANGE UP (ref 0.7–2)
LEUKOCYTE ESTERASE UR-ACNC: NEGATIVE — SIGNIFICANT CHANGE UP
LYMPHOCYTES # BLD AUTO: 1.22 K/UL — SIGNIFICANT CHANGE UP (ref 1–3.3)
LYMPHOCYTES NFR BLD AUTO: 32.3 % — SIGNIFICANT CHANGE UP (ref 13–44)
MANUAL SMEAR VERIFICATION: SIGNIFICANT CHANGE UP
MCHC RBC-ENTMCNC: 29.3 PG — SIGNIFICANT CHANGE UP (ref 27–34)
MCHC RBC-ENTMCNC: 32.6 G/DL — SIGNIFICANT CHANGE UP (ref 32–36)
MCV RBC AUTO: 89.8 FL — SIGNIFICANT CHANGE UP (ref 80–100)
MONOCYTES # BLD AUTO: 1.22 K/UL — HIGH (ref 0–0.9)
MONOCYTES NFR BLD AUTO: 32.3 % — HIGH (ref 2–14)
NEUTROPHILS # BLD AUTO: 1.26 K/UL — LOW (ref 1.8–7.4)
NEUTROPHILS NFR BLD AUTO: 33.3 % — LOW (ref 43–77)
NITRITE UR-MCNC: NEGATIVE — SIGNIFICANT CHANGE UP
NRBC # BLD AUTO: 0 K/UL — SIGNIFICANT CHANGE UP (ref 0–0)
NRBC # FLD: 0 K/UL — SIGNIFICANT CHANGE UP (ref 0–0)
NRBC BLD AUTO-RTO: 0 /100 WBCS — SIGNIFICANT CHANGE UP (ref 0–0)
PH UR: 8 — SIGNIFICANT CHANGE UP (ref 5–8)
PLAT MORPH BLD: NORMAL — SIGNIFICANT CHANGE UP
PLATELET # BLD AUTO: 247 K/UL — SIGNIFICANT CHANGE UP (ref 150–400)
PLATELET COUNT - ESTIMATE: NORMAL — SIGNIFICANT CHANGE UP
PMV BLD: 11.2 FL — SIGNIFICANT CHANGE UP (ref 7–13)
POTASSIUM SERPL-MCNC: 3.1 MMOL/L — LOW (ref 3.5–5.3)
POTASSIUM SERPL-SCNC: 3.1 MMOL/L — LOW (ref 3.5–5.3)
PROT SERPL-MCNC: 8.2 GM/DL — SIGNIFICANT CHANGE UP (ref 6–8.3)
PROT UR-MCNC: 100 MG/DL
PROTHROM AB SERPL-ACNC: 15.1 SEC — HIGH (ref 9.9–13.4)
RBC # BLD: 4.1 M/UL — SIGNIFICANT CHANGE UP (ref 3.8–5.2)
RBC # FLD: 14.2 % — SIGNIFICANT CHANGE UP (ref 10.3–14.5)
RBC BLD AUTO: NORMAL — SIGNIFICANT CHANGE UP
RBC CASTS # UR COMP ASSIST: 23 /HPF — HIGH (ref 0–4)
RSV RNA NPH QL NAA+NON-PROBE: SIGNIFICANT CHANGE UP
SARS-COV-2 RNA SPEC QL NAA+PROBE: SIGNIFICANT CHANGE UP
SODIUM SERPL-SCNC: 135 MMOL/L — SIGNIFICANT CHANGE UP (ref 135–145)
SOURCE RESPIRATORY: SIGNIFICANT CHANGE UP
SP GR SPEC: 1.01 — SIGNIFICANT CHANGE UP (ref 1–1.03)
SQUAMOUS # UR AUTO: 2 /HPF — SIGNIFICANT CHANGE UP (ref 0–5)
UROBILINOGEN FLD QL: 1 MG/DL — SIGNIFICANT CHANGE UP (ref 0.2–1)
WBC # BLD: 3.78 K/UL — LOW (ref 3.8–10.5)
WBC # FLD AUTO: 3.78 K/UL — LOW (ref 3.8–10.5)
WBC MORPHOLOGY: NORMAL — SIGNIFICANT CHANGE UP
WBC UR QL: 1 /HPF — SIGNIFICANT CHANGE UP (ref 0–5)

## 2025-08-31 PROCEDURE — 36415 COLL VENOUS BLD VENIPUNCTURE: CPT

## 2025-08-31 PROCEDURE — 74177 CT ABD & PELVIS W/CONTRAST: CPT | Mod: 26

## 2025-08-31 PROCEDURE — 80053 COMPREHEN METABOLIC PANEL: CPT

## 2025-08-31 PROCEDURE — 71045 X-RAY EXAM CHEST 1 VIEW: CPT | Mod: 26

## 2025-08-31 PROCEDURE — 99223 1ST HOSP IP/OBS HIGH 75: CPT

## 2025-08-31 PROCEDURE — 85027 COMPLETE CBC AUTOMATED: CPT

## 2025-08-31 PROCEDURE — 80074 ACUTE HEPATITIS PANEL: CPT

## 2025-08-31 PROCEDURE — 99285 EMERGENCY DEPT VISIT HI MDM: CPT

## 2025-08-31 PROCEDURE — 93010 ELECTROCARDIOGRAM REPORT: CPT

## 2025-08-31 RX ORDER — RIVAROXABAN 10 MG/1
1 TABLET, FILM COATED ORAL
Refills: 0 | DISCHARGE

## 2025-08-31 RX ORDER — ERTAPENEM SODIUM 1 G/1
1000 INJECTION, POWDER, LYOPHILIZED, FOR SOLUTION INTRAMUSCULAR; INTRAVENOUS EVERY 24 HOURS
Refills: 0 | Status: DISCONTINUED | OUTPATIENT
Start: 2025-09-01 | End: 2025-09-03

## 2025-08-31 RX ORDER — ACETAMINOPHEN 500 MG/5ML
650 LIQUID (ML) ORAL EVERY 6 HOURS
Refills: 0 | Status: DISCONTINUED | OUTPATIENT
Start: 2025-08-31 | End: 2025-09-03

## 2025-08-31 RX ORDER — ERTAPENEM SODIUM 1 G/1
1000 INJECTION, POWDER, LYOPHILIZED, FOR SOLUTION INTRAMUSCULAR; INTRAVENOUS ONCE
Refills: 0 | Status: COMPLETED | OUTPATIENT
Start: 2025-08-31 | End: 2025-08-31

## 2025-08-31 RX ORDER — METOPROLOL SUCCINATE 50 MG/1
1 TABLET, EXTENDED RELEASE ORAL
Refills: 0 | DISCHARGE

## 2025-08-31 RX ORDER — TIMOLOL MALEATE 6.8 MG/ML
1 SOLUTION OPHTHALMIC AT BEDTIME
Refills: 0 | Status: DISCONTINUED | OUTPATIENT
Start: 2025-08-31 | End: 2025-09-03

## 2025-08-31 RX ORDER — LOSARTAN POTASSIUM 100 MG/1
100 TABLET, FILM COATED ORAL DAILY
Refills: 0 | Status: DISCONTINUED | OUTPATIENT
Start: 2025-08-31 | End: 2025-09-03

## 2025-08-31 RX ORDER — TIMOLOL MALEATE 6.8 MG/ML
1 SOLUTION OPHTHALMIC
Refills: 0 | DISCHARGE

## 2025-08-31 RX ORDER — ONDANSETRON HCL/PF 4 MG/2 ML
4 VIAL (ML) INJECTION EVERY 8 HOURS
Refills: 0 | Status: DISCONTINUED | OUTPATIENT
Start: 2025-08-31 | End: 2025-09-03

## 2025-08-31 RX ORDER — ERTAPENEM SODIUM 1 G/1
1000 INJECTION, POWDER, LYOPHILIZED, FOR SOLUTION INTRAMUSCULAR; INTRAVENOUS ONCE
Refills: 0 | Status: DISCONTINUED | OUTPATIENT
Start: 2025-08-31 | End: 2025-08-31

## 2025-08-31 RX ORDER — MAGNESIUM, ALUMINUM HYDROXIDE 200-200 MG
30 TABLET,CHEWABLE ORAL EVERY 4 HOURS
Refills: 0 | Status: DISCONTINUED | OUTPATIENT
Start: 2025-08-31 | End: 2025-09-03

## 2025-08-31 RX ORDER — METOPROLOL SUCCINATE 50 MG/1
100 TABLET, EXTENDED RELEASE ORAL DAILY
Refills: 0 | Status: DISCONTINUED | OUTPATIENT
Start: 2025-08-31 | End: 2025-09-03

## 2025-08-31 RX ORDER — MELATONIN 5 MG
3 TABLET ORAL AT BEDTIME
Refills: 0 | Status: DISCONTINUED | OUTPATIENT
Start: 2025-08-31 | End: 2025-09-03

## 2025-08-31 RX ORDER — DILTIAZEM HYDROCHLORIDE 240 MG/1
360 TABLET, EXTENDED RELEASE ORAL DAILY
Refills: 0 | Status: DISCONTINUED | OUTPATIENT
Start: 2025-08-31 | End: 2025-09-03

## 2025-08-31 RX ORDER — DILTIAZEM HYDROCHLORIDE 240 MG/1
1 TABLET, EXTENDED RELEASE ORAL
Refills: 0 | DISCHARGE

## 2025-08-31 RX ORDER — LOSARTAN POTASSIUM AND HYDROCHLOROTHIAZIDE 12.5; 5 MG/1; MG/1
1 TABLET ORAL
Refills: 0 | DISCHARGE

## 2025-08-31 RX ORDER — RIVAROXABAN 10 MG/1
20 TABLET, FILM COATED ORAL
Refills: 0 | Status: DISCONTINUED | OUTPATIENT
Start: 2025-08-31 | End: 2025-09-01

## 2025-08-31 RX ORDER — METOPROLOL SUCCINATE 50 MG/1
50 TABLET, EXTENDED RELEASE ORAL AT BEDTIME
Refills: 0 | Status: DISCONTINUED | OUTPATIENT
Start: 2025-08-31 | End: 2025-09-03

## 2025-08-31 RX ADMIN — Medication 2200 MILLILITER(S): at 15:08

## 2025-08-31 RX ADMIN — RIVAROXABAN 20 MILLIGRAM(S): 10 TABLET, FILM COATED ORAL at 23:18

## 2025-08-31 RX ADMIN — Medication 650 MILLIGRAM(S): at 23:19

## 2025-08-31 RX ADMIN — TIMOLOL MALEATE 1 DROP(S): 6.8 SOLUTION OPHTHALMIC at 23:18

## 2025-08-31 RX ADMIN — Medication 100 MILLIEQUIVALENT(S): at 20:44

## 2025-08-31 RX ADMIN — ERTAPENEM SODIUM 120 MILLIGRAM(S): 1 INJECTION, POWDER, LYOPHILIZED, FOR SOLUTION INTRAMUSCULAR; INTRAVENOUS at 15:57

## 2025-09-01 LAB
ALBUMIN SERPL ELPH-MCNC: 2.7 G/DL — LOW (ref 3.3–5)
ALP SERPL-CCNC: 197 U/L — HIGH (ref 40–120)
ALT FLD-CCNC: 224 U/L — HIGH (ref 12–78)
ANION GAP SERPL CALC-SCNC: 9 MMOL/L — SIGNIFICANT CHANGE UP (ref 5–17)
AST SERPL-CCNC: 125 U/L — HIGH (ref 15–37)
BILIRUB SERPL-MCNC: 0.6 MG/DL — SIGNIFICANT CHANGE UP (ref 0.2–1.2)
BUN SERPL-MCNC: 13 MG/DL — SIGNIFICANT CHANGE UP (ref 7–23)
CALCIUM SERPL-MCNC: 8.8 MG/DL — SIGNIFICANT CHANGE UP (ref 8.5–10.1)
CHLORIDE SERPL-SCNC: 107 MMOL/L — SIGNIFICANT CHANGE UP (ref 96–108)
CO2 SERPL-SCNC: 22 MMOL/L — SIGNIFICANT CHANGE UP (ref 22–31)
CREAT SERPL-MCNC: 0.76 MG/DL — SIGNIFICANT CHANGE UP (ref 0.5–1.3)
EGFR: 78 ML/MIN/1.73M2 — SIGNIFICANT CHANGE UP
EGFR: 78 ML/MIN/1.73M2 — SIGNIFICANT CHANGE UP
GLUCOSE SERPL-MCNC: 87 MG/DL — SIGNIFICANT CHANGE UP (ref 70–99)
HAV IGM SER-ACNC: SIGNIFICANT CHANGE UP
HBV CORE IGM SER-ACNC: SIGNIFICANT CHANGE UP
HBV SURFACE AG SER-ACNC: SIGNIFICANT CHANGE UP
HCT VFR BLD CALC: 32.4 % — LOW (ref 34.5–45)
HCV AB S/CO SERPL IA: 0.14 S/CO — SIGNIFICANT CHANGE UP (ref 0–0.79)
HCV AB SERPL-IMP: SIGNIFICANT CHANGE UP
HGB BLD-MCNC: 10.7 G/DL — LOW (ref 11.5–15.5)
MCHC RBC-ENTMCNC: 29.6 PG — SIGNIFICANT CHANGE UP (ref 27–34)
MCHC RBC-ENTMCNC: 33 G/DL — SIGNIFICANT CHANGE UP (ref 32–36)
MCV RBC AUTO: 89.5 FL — SIGNIFICANT CHANGE UP (ref 80–100)
NRBC # BLD AUTO: 0 K/UL — SIGNIFICANT CHANGE UP (ref 0–0)
NRBC # FLD: 0 K/UL — SIGNIFICANT CHANGE UP (ref 0–0)
NRBC BLD AUTO-RTO: 0 /100 WBCS — SIGNIFICANT CHANGE UP (ref 0–0)
PLATELET # BLD AUTO: 203 K/UL — SIGNIFICANT CHANGE UP (ref 150–400)
PMV BLD: 11.4 FL — SIGNIFICANT CHANGE UP (ref 7–13)
POTASSIUM SERPL-MCNC: 3.2 MMOL/L — LOW (ref 3.5–5.3)
POTASSIUM SERPL-SCNC: 3.2 MMOL/L — LOW (ref 3.5–5.3)
PROT SERPL-MCNC: 7 GM/DL — SIGNIFICANT CHANGE UP (ref 6–8.3)
RBC # BLD: 3.62 M/UL — LOW (ref 3.8–5.2)
RBC # FLD: 14.5 % — SIGNIFICANT CHANGE UP (ref 10.3–14.5)
SODIUM SERPL-SCNC: 138 MMOL/L — SIGNIFICANT CHANGE UP (ref 135–145)
WBC # BLD: 3.27 K/UL — LOW (ref 3.8–10.5)
WBC # FLD AUTO: 3.27 K/UL — LOW (ref 3.8–10.5)

## 2025-09-01 PROCEDURE — 99233 SBSQ HOSP IP/OBS HIGH 50: CPT

## 2025-09-01 RX ADMIN — TIMOLOL MALEATE 1 DROP(S): 6.8 SOLUTION OPHTHALMIC at 22:37

## 2025-09-01 RX ADMIN — Medication 40 MILLIGRAM(S): at 06:16

## 2025-09-01 RX ADMIN — ERTAPENEM SODIUM 120 MILLIGRAM(S): 1 INJECTION, POWDER, LYOPHILIZED, FOR SOLUTION INTRAMUSCULAR; INTRAVENOUS at 22:36

## 2025-09-01 RX ADMIN — Medication 20 MILLIEQUIVALENT(S): at 23:07

## 2025-09-01 RX ADMIN — METOPROLOL SUCCINATE 100 MILLIGRAM(S): 50 TABLET, EXTENDED RELEASE ORAL at 09:41

## 2025-09-01 RX ADMIN — METOPROLOL SUCCINATE 50 MILLIGRAM(S): 50 TABLET, EXTENDED RELEASE ORAL at 22:36

## 2025-09-01 RX ADMIN — Medication 100 MILLILITER(S): at 00:08

## 2025-09-01 RX ADMIN — Medication 20 MILLIEQUIVALENT(S): at 22:22

## 2025-09-01 RX ADMIN — LOSARTAN POTASSIUM 100 MILLIGRAM(S): 100 TABLET, FILM COATED ORAL at 09:40

## 2025-09-01 RX ADMIN — DILTIAZEM HYDROCHLORIDE 360 MILLIGRAM(S): 240 TABLET, EXTENDED RELEASE ORAL at 09:41

## 2025-09-01 RX ADMIN — Medication 100 MILLIEQUIVALENT(S): at 00:09

## 2025-09-01 RX ADMIN — Medication 100 MILLIEQUIVALENT(S): at 02:08

## 2025-09-01 RX ADMIN — Medication 100 MILLILITER(S): at 09:40

## 2025-09-01 RX ADMIN — Medication 1000 UNIT(S): at 09:41

## 2025-09-01 RX ADMIN — Medication 650 MILLIGRAM(S): at 00:25

## 2025-09-02 ENCOUNTER — TRANSCRIPTION ENCOUNTER (OUTPATIENT)
Age: 85
End: 2025-09-02

## 2025-09-02 LAB
ALBUMIN SERPL ELPH-MCNC: 2.8 G/DL — LOW (ref 3.3–5)
ALP SERPL-CCNC: 187 U/L — HIGH (ref 40–120)
ALT FLD-CCNC: 182 U/L — HIGH (ref 12–78)
ANION GAP SERPL CALC-SCNC: 6 MMOL/L — SIGNIFICANT CHANGE UP (ref 5–17)
AST SERPL-CCNC: 87 U/L — HIGH (ref 15–37)
BILIRUB SERPL-MCNC: 0.5 MG/DL — SIGNIFICANT CHANGE UP (ref 0.2–1.2)
BUN SERPL-MCNC: 12 MG/DL — SIGNIFICANT CHANGE UP (ref 7–23)
CALCIUM SERPL-MCNC: 9.2 MG/DL — SIGNIFICANT CHANGE UP (ref 8.5–10.1)
CHLORIDE SERPL-SCNC: 107 MMOL/L — SIGNIFICANT CHANGE UP (ref 96–108)
CO2 SERPL-SCNC: 25 MMOL/L — SIGNIFICANT CHANGE UP (ref 22–31)
CREAT SERPL-MCNC: 0.72 MG/DL — SIGNIFICANT CHANGE UP (ref 0.5–1.3)
EGFR: 83 ML/MIN/1.73M2 — SIGNIFICANT CHANGE UP
EGFR: 83 ML/MIN/1.73M2 — SIGNIFICANT CHANGE UP
GLUCOSE SERPL-MCNC: 80 MG/DL — SIGNIFICANT CHANGE UP (ref 70–99)
HCT VFR BLD CALC: 34.9 % — SIGNIFICANT CHANGE UP (ref 34.5–45)
HGB BLD-MCNC: 11.3 G/DL — LOW (ref 11.5–15.5)
MCHC RBC-ENTMCNC: 29.1 PG — SIGNIFICANT CHANGE UP (ref 27–34)
MCHC RBC-ENTMCNC: 32.4 G/DL — SIGNIFICANT CHANGE UP (ref 32–36)
MCV RBC AUTO: 89.9 FL — SIGNIFICANT CHANGE UP (ref 80–100)
NRBC # BLD AUTO: 0 K/UL — SIGNIFICANT CHANGE UP (ref 0–0)
NRBC # FLD: 0 K/UL — SIGNIFICANT CHANGE UP (ref 0–0)
NRBC BLD AUTO-RTO: 0 /100 WBCS — SIGNIFICANT CHANGE UP (ref 0–0)
PLATELET # BLD AUTO: 218 K/UL — SIGNIFICANT CHANGE UP (ref 150–400)
PMV BLD: 10.8 FL — SIGNIFICANT CHANGE UP (ref 7–13)
POTASSIUM SERPL-MCNC: 3.4 MMOL/L — LOW (ref 3.5–5.3)
POTASSIUM SERPL-SCNC: 3.4 MMOL/L — LOW (ref 3.5–5.3)
PROT SERPL-MCNC: 7.1 GM/DL — SIGNIFICANT CHANGE UP (ref 6–8.3)
RBC # BLD: 3.88 M/UL — SIGNIFICANT CHANGE UP (ref 3.8–5.2)
RBC # FLD: 13.8 % — SIGNIFICANT CHANGE UP (ref 10.3–14.5)
SODIUM SERPL-SCNC: 138 MMOL/L — SIGNIFICANT CHANGE UP (ref 135–145)
WBC # BLD: 4.55 K/UL — SIGNIFICANT CHANGE UP (ref 3.8–10.5)
WBC # FLD AUTO: 4.55 K/UL — SIGNIFICANT CHANGE UP (ref 3.8–10.5)

## 2025-09-02 PROCEDURE — 99239 HOSP IP/OBS DSCHRG MGMT >30: CPT

## 2025-09-02 RX ORDER — ERTAPENEM SODIUM 1 G/1
1 INJECTION, POWDER, LYOPHILIZED, FOR SOLUTION INTRAMUSCULAR; INTRAVENOUS
Qty: 0 | Refills: 0 | DISCHARGE
Start: 2025-09-02

## 2025-09-02 RX ADMIN — TIMOLOL MALEATE 1 DROP(S): 6.8 SOLUTION OPHTHALMIC at 21:28

## 2025-09-02 RX ADMIN — METOPROLOL SUCCINATE 100 MILLIGRAM(S): 50 TABLET, EXTENDED RELEASE ORAL at 09:51

## 2025-09-02 RX ADMIN — Medication 40 MILLIGRAM(S): at 06:34

## 2025-09-02 RX ADMIN — METOPROLOL SUCCINATE 50 MILLIGRAM(S): 50 TABLET, EXTENDED RELEASE ORAL at 21:28

## 2025-09-02 RX ADMIN — ERTAPENEM SODIUM 120 MILLIGRAM(S): 1 INJECTION, POWDER, LYOPHILIZED, FOR SOLUTION INTRAMUSCULAR; INTRAVENOUS at 21:29

## 2025-09-02 RX ADMIN — LOSARTAN POTASSIUM 100 MILLIGRAM(S): 100 TABLET, FILM COATED ORAL at 09:51

## 2025-09-02 RX ADMIN — DILTIAZEM HYDROCHLORIDE 360 MILLIGRAM(S): 240 TABLET, EXTENDED RELEASE ORAL at 09:51

## 2025-09-02 RX ADMIN — Medication 1000 UNIT(S): at 09:52

## 2025-09-03 ENCOUNTER — TRANSCRIPTION ENCOUNTER (OUTPATIENT)
Age: 85
End: 2025-09-03

## 2025-09-03 VITALS
TEMPERATURE: 98 F | HEART RATE: 60 BPM | RESPIRATION RATE: 18 BRPM | OXYGEN SATURATION: 98 % | SYSTOLIC BLOOD PRESSURE: 119 MMHG | DIASTOLIC BLOOD PRESSURE: 78 MMHG

## 2025-09-03 PROCEDURE — 99232 SBSQ HOSP IP/OBS MODERATE 35: CPT

## 2025-09-03 RX ADMIN — ERTAPENEM SODIUM 120 MILLIGRAM(S): 1 INJECTION, POWDER, LYOPHILIZED, FOR SOLUTION INTRAMUSCULAR; INTRAVENOUS at 15:56

## 2025-09-03 RX ADMIN — Medication 40 MILLIGRAM(S): at 06:15

## 2025-09-03 RX ADMIN — Medication 1000 UNIT(S): at 09:11

## 2025-09-03 RX ADMIN — DILTIAZEM HYDROCHLORIDE 360 MILLIGRAM(S): 240 TABLET, EXTENDED RELEASE ORAL at 09:10

## 2025-09-03 RX ADMIN — METOPROLOL SUCCINATE 100 MILLIGRAM(S): 50 TABLET, EXTENDED RELEASE ORAL at 09:11

## 2025-09-03 RX ADMIN — LOSARTAN POTASSIUM 100 MILLIGRAM(S): 100 TABLET, FILM COATED ORAL at 09:10

## 2025-09-05 LAB
CULTURE RESULTS: SIGNIFICANT CHANGE UP
CULTURE RESULTS: SIGNIFICANT CHANGE UP
SPECIMEN SOURCE: SIGNIFICANT CHANGE UP
SPECIMEN SOURCE: SIGNIFICANT CHANGE UP

## 2025-09-08 DIAGNOSIS — Z95.0 PRESENCE OF CARDIAC PACEMAKER: ICD-10-CM

## 2025-09-08 DIAGNOSIS — I10 ESSENTIAL (PRIMARY) HYPERTENSION: ICD-10-CM

## 2025-09-08 DIAGNOSIS — Z79.01 LONG TERM (CURRENT) USE OF ANTICOAGULANTS: ICD-10-CM

## 2025-09-08 DIAGNOSIS — K86.89 OTHER SPECIFIED DISEASES OF PANCREAS: ICD-10-CM

## 2025-09-08 DIAGNOSIS — Z85.3 PERSONAL HISTORY OF MALIGNANT NEOPLASM OF BREAST: ICD-10-CM

## 2025-09-08 DIAGNOSIS — E78.5 HYPERLIPIDEMIA, UNSPECIFIED: ICD-10-CM

## 2025-09-08 DIAGNOSIS — Z88.5 ALLERGY STATUS TO NARCOTIC AGENT: ICD-10-CM

## 2025-09-08 DIAGNOSIS — Z90.12 ACQUIRED ABSENCE OF LEFT BREAST AND NIPPLE: ICD-10-CM

## 2025-09-08 DIAGNOSIS — E87.20 ACIDOSIS, UNSPECIFIED: ICD-10-CM

## 2025-09-08 DIAGNOSIS — I49.5 SICK SINUS SYNDROME: ICD-10-CM

## 2025-09-08 DIAGNOSIS — Z85.51 PERSONAL HISTORY OF MALIGNANT NEOPLASM OF BLADDER: ICD-10-CM

## 2025-09-08 DIAGNOSIS — Z87.11 PERSONAL HISTORY OF PEPTIC ULCER DISEASE: ICD-10-CM

## 2025-09-08 DIAGNOSIS — E87.6 HYPOKALEMIA: ICD-10-CM

## 2025-09-08 DIAGNOSIS — E55.9 VITAMIN D DEFICIENCY, UNSPECIFIED: ICD-10-CM

## 2025-09-08 DIAGNOSIS — Z11.52 ENCOUNTER FOR SCREENING FOR COVID-19: ICD-10-CM

## 2025-09-08 DIAGNOSIS — Z88.8 ALLERGY STATUS TO OTHER DRUGS, MEDICAMENTS AND BIOLOGICAL SUBSTANCES: ICD-10-CM

## 2025-09-08 DIAGNOSIS — H40.9 UNSPECIFIED GLAUCOMA: ICD-10-CM

## 2025-09-08 DIAGNOSIS — I48.0 PAROXYSMAL ATRIAL FIBRILLATION: ICD-10-CM

## 2025-09-08 DIAGNOSIS — Z90.710 ACQUIRED ABSENCE OF BOTH CERVIX AND UTERUS: ICD-10-CM

## 2025-09-08 DIAGNOSIS — Z90.49 ACQUIRED ABSENCE OF OTHER SPECIFIED PARTS OF DIGESTIVE TRACT: ICD-10-CM

## 2025-09-08 DIAGNOSIS — K75.0 ABSCESS OF LIVER: ICD-10-CM

## 2025-09-08 DIAGNOSIS — A41.9 SEPSIS, UNSPECIFIED ORGANISM: ICD-10-CM
